# Patient Record
Sex: FEMALE | Race: BLACK OR AFRICAN AMERICAN | NOT HISPANIC OR LATINO | Employment: FULL TIME | URBAN - METROPOLITAN AREA
[De-identification: names, ages, dates, MRNs, and addresses within clinical notes are randomized per-mention and may not be internally consistent; named-entity substitution may affect disease eponyms.]

---

## 2019-08-29 ENCOUNTER — HOSPITAL ENCOUNTER (OUTPATIENT)
Dept: LAB | Facility: HOSPITAL | Age: 34
Discharge: HOME | End: 2019-08-29
Attending: INTERNAL MEDICINE

## 2019-08-29 LAB
BASOPHILS # BLD AUTO: 0.06 10*3/UL (ref 0–0.2)
BASOPHILS NFR BLD AUTO: 0.5 % (ref 0–3)
CONV ABS IMM GRAN: 0.04 10*3/UL (ref 0–0.2)
CONV IMMATURE GRAN: 0.3 % (ref 0–1.8)
DEPRECATED RDW RBC AUTO: 46.1 FL (ref 36.4–46.3)
EOSINOPHIL # BLD AUTO: 0.15 10*3/UL (ref 0–0.7)
EOSINOPHIL # BLD AUTO: 1.2 % (ref 0–7)
ERYTHROCYTE [DISTWIDTH] IN BLOOD BY AUTOMATED COUNT: 15 % (ref 11.7–14.4)
HCT VFR BLD AUTO: 40.8 % (ref 37–47)
HGB BLD-MCNC: 12.9 G/DL (ref 12–16)
LYMPHOCYTES # BLD AUTO: 4.07 10*3/UL (ref 1–5)
LYMPHOCYTES NFR BLD AUTO: 32.4 % (ref 20–45)
MCH RBC QN AUTO: 27 PG (ref 27–31)
MCHC RBC AUTO-ENTMCNC: 31.6 G/DL (ref 33–37)
MCV RBC AUTO: 85.4 FL (ref 81–99)
MONOCYTES # BLD AUTO: 0.6 10*3/UL (ref 0.2–1.2)
MONOCYTES NFR BLD AUTO: 4.8 % (ref 3–10)
NEUTROPHILS # BLD AUTO: 7.65 10*3/UL (ref 2–8)
NEUTROPHILS NFR BLD AUTO: 60.8 % (ref 30–85)
NRBC CBCN: 0 % (ref 0–0.7)
PLATELET # BLD AUTO: 412 10*3/UL (ref 130–400)
PMV BLD AUTO: 10.3 FL (ref 9.4–12.3)
RBC # BLD AUTO: 4.78 10*6/UL (ref 4.2–5.4)
WBC # BLD AUTO: 12.57 10*3/UL (ref 4.8–10.8)

## 2020-02-21 ENCOUNTER — HOSPITAL ENCOUNTER (OUTPATIENT)
Dept: LAB | Facility: HOSPITAL | Age: 35
Discharge: HOME | End: 2020-02-21
Attending: INTERNAL MEDICINE

## 2020-02-21 LAB
BASOPHILS # BLD AUTO: 0.07 10*3/UL (ref 0–0.2)
BASOPHILS NFR BLD AUTO: 0.6 % (ref 0–3)
CONV ABS IMM GRAN: 0.05 10*3/UL (ref 0–0.2)
CONV IMMATURE GRAN: 0.4 % (ref 0–1.8)
CRP SERPL-MCNC: 8.1 MG/L (ref 0–5)
DEPRECATED RDW RBC AUTO: 48.2 FL (ref 36.4–46.3)
EOSINOPHIL # BLD AUTO: 0.2 10*3/UL (ref 0–0.7)
EOSINOPHIL # BLD AUTO: 1.6 % (ref 0–7)
ERYTHROCYTE [DISTWIDTH] IN BLOOD BY AUTOMATED COUNT: 15.2 % (ref 11.7–14.4)
ERYTHROCYTE [SEDIMENTATION RATE] IN BLOOD: 28 MM/H (ref 0–20)
HCT VFR BLD AUTO: 36.4 % (ref 37–47)
HGB BLD-MCNC: 11.9 G/DL (ref 12–16)
LYMPHOCYTES # BLD AUTO: 4.87 10*3/UL (ref 1–5)
LYMPHOCYTES NFR BLD AUTO: 40.1 % (ref 20–45)
MCH RBC QN AUTO: 28.5 PG (ref 27–31)
MCHC RBC AUTO-ENTMCNC: 32.7 G/DL (ref 33–37)
MCV RBC AUTO: 87.3 FL (ref 81–99)
MONOCYTES # BLD AUTO: 0.96 10*3/UL (ref 0.2–1.2)
MONOCYTES NFR BLD AUTO: 7.9 % (ref 3–10)
NEUTROPHILS # BLD AUTO: 5.98 10*3/UL (ref 2–8)
NEUTROPHILS NFR BLD AUTO: 49.4 % (ref 30–85)
NRBC CBCN: 0 % (ref 0–0.7)
PLATELET # BLD AUTO: 374 10*3/UL (ref 130–400)
PMV BLD AUTO: 10.5 FL (ref 9.4–12.3)
RBC # BLD AUTO: 4.17 10*6/UL (ref 4.2–5.4)
WBC # BLD AUTO: 12.13 10*3/UL (ref 4.8–10.8)

## 2020-10-28 ENCOUNTER — HOSPITAL ENCOUNTER (OUTPATIENT)
Dept: LAB | Facility: HOSPITAL | Age: 35
Discharge: HOME | End: 2020-10-28
Attending: INTERNAL MEDICINE

## 2020-10-28 LAB
BASOPHILS # BLD AUTO: 0.07 10*3/UL (ref 0–0.2)
BASOPHILS NFR BLD AUTO: 0.5 % (ref 0–3)
CONV ABS IMM GRAN: 0.05 10*3/UL (ref 0–0.2)
CONV IMMATURE GRAN: 0.4 % (ref 0–1.8)
DEPRECATED RDW RBC AUTO: 46.8 FL (ref 36.4–46.3)
EOSINOPHIL # BLD AUTO: 0.12 10*3/UL (ref 0–0.7)
EOSINOPHIL # BLD AUTO: 0.9 % (ref 0–7)
ERYTHROCYTE [DISTWIDTH] IN BLOOD BY AUTOMATED COUNT: 14.5 % (ref 11.7–14.4)
HCT VFR BLD AUTO: 40.7 % (ref 37–47)
HGB BLD-MCNC: 13.2 G/DL (ref 12–16)
LYMPHOCYTES # BLD AUTO: 3.69 10*3/UL (ref 1–5)
LYMPHOCYTES NFR BLD AUTO: 26.6 % (ref 20–45)
MCH RBC QN AUTO: 28.7 PG (ref 27–31)
MCHC RBC AUTO-ENTMCNC: 32.4 G/DL (ref 33–37)
MCV RBC AUTO: 88.5 FL (ref 81–99)
MONOCYTES # BLD AUTO: 0.71 10*3/UL (ref 0.2–1.2)
MONOCYTES NFR BLD AUTO: 5.1 % (ref 3–10)
NEUTROPHILS # BLD AUTO: 9.22 10*3/UL (ref 2–8)
NEUTROPHILS NFR BLD AUTO: 66.5 % (ref 30–85)
NRBC CBCN: 0 % (ref 0–0.7)
PLATELET # BLD AUTO: 352 10*3/UL (ref 130–400)
PMV BLD AUTO: 10.1 FL (ref 9.4–12.3)
RBC # BLD AUTO: 4.6 10*6/UL (ref 4.2–5.4)
WBC # BLD AUTO: 13.86 10*3/UL (ref 4.8–10.8)

## 2020-10-29 LAB
25(OH)D3 SERPL-MCNC: 49.6 NG/ML (ref 30–100)
CK SERPL-CCNC: 144 U/L (ref 35–230)
CRP SERPL HS-MCNC: 0.58 MG/DL (ref 0–0.5)

## 2020-10-31 LAB
CONV QUANTIFERON TB GOLD: NEGATIVE
QUANTIFERON CRITERIA: NORMAL
QUANTIFERON MITOGEN VALUE: >10 IU/ML
QUANTIFERON NIL VALUE: 0.04 IU/ML
QUANTIFERON TB1 AG VALUE: 0.04 IU/ML
QUANTIFERON TB2 AG VALUE: 0.03 IU/ML

## 2021-06-03 ENCOUNTER — HOSPITAL ENCOUNTER (OUTPATIENT)
Dept: PREADMISSION TESTING | Facility: HOSPITAL | Age: 36
Discharge: HOME | End: 2021-06-03
Attending: OBSTETRICS & GYNECOLOGY

## 2021-06-03 LAB — SARS-COV-2 RNA SPEC QL NAA+PROBE: NOT DETECTED

## 2021-06-03 RX ORDER — BRIMONIDINE TARTRATE 2 MG/ML
1 SOLUTION/ DROPS OPHTHALMIC 2 TIMES DAILY
COMMUNITY
End: 2021-11-17

## 2021-06-03 RX ORDER — LAMOTRIGINE 100 MG/1
150 TABLET ORAL 2 TIMES DAILY
COMMUNITY

## 2021-06-03 RX ORDER — ADALIMUMAB 40MG/0.4ML
40 KIT SUBCUTANEOUS
COMMUNITY

## 2021-06-03 RX ORDER — TRAZODONE HYDROCHLORIDE 100 MG/1
150 TABLET ORAL NIGHTLY
COMMUNITY
End: 2022-04-15 | Stop reason: SDUPTHER

## 2021-06-03 RX ORDER — CETIRIZINE HYDROCHLORIDE 10 MG/1
10 TABLET ORAL DAILY
COMMUNITY

## 2021-06-03 RX ORDER — BUPROPION HYDROCHLORIDE 100 MG/1
150 TABLET ORAL DAILY
COMMUNITY
End: 2023-03-20 | Stop reason: SDUPTHER

## 2021-06-03 RX ORDER — MULTIPLE VITAMINS W/ MINERALS TAB 9MG-400MCG
1 TAB ORAL DAILY
COMMUNITY

## 2021-06-03 RX ORDER — BUSPIRONE HYDROCHLORIDE 15 MG/1
15 TABLET ORAL 3 TIMES DAILY
COMMUNITY

## 2021-06-03 NOTE — PRE-PROCEDURE INSTRUCTIONS
Pre-op instructions reviewed with pt. Instructed to take buproprion, buspirone lamictal, citrizine and eye drops a.m. DOS, pt. Verbalized understanding.

## 2021-06-07 ENCOUNTER — DOCUMENTATION (OUTPATIENT)
Dept: OBSTETRICS AND GYNECOLOGY | Facility: HOSPITAL | Age: 36
End: 2021-06-07

## 2021-06-07 DIAGNOSIS — G89.29 CHRONIC PELVIC PAIN IN FEMALE: Primary | ICD-10-CM

## 2021-06-07 DIAGNOSIS — R10.2 CHRONIC PELVIC PAIN IN FEMALE: Primary | ICD-10-CM

## 2021-06-07 DIAGNOSIS — D25.1 FIBROIDS, INTRAMURAL: ICD-10-CM

## 2021-06-07 RX ORDER — CEFAZOLIN SODIUM 2 G/100ML
2 INJECTION, SOLUTION INTRAVENOUS ONCE
Status: CANCELLED | OUTPATIENT
Start: 2021-06-07 | End: 2021-06-07

## 2021-06-07 RX ORDER — PHENAZOPYRIDINE HYDROCHLORIDE 200 MG/1
200 TABLET, FILM COATED ORAL ONCE
Status: CANCELLED | OUTPATIENT
Start: 2021-06-07 | End: 2021-06-07

## 2021-06-07 NOTE — H&P
[facility]   HISTORY AND PHYSICAL    Patient Name: Bhargavi Jeronimo  : 1985  MRN: [id number]  Primary Care Physician:  Provider, No Known  Date of admission: (Not on file)    Subjective   Subjective     Chief Complaint: Chronic Pelvic Pain, Fibroids    History of Present Illness :above    Review of Systems : above. G 1 P0    Personal History     Past Medical History:   Diagnosis Date   • Anxiety    • Depression    • Intermediate uveitis    • Migraine        Past Surgical History:   Procedure Laterality Date   • ORIF ANKLE FRACTURE Left        Family History: family history is not on file. Otherwise pertinent FHx was reviewed and not pertinent to current issue.    Social History:  reports that she has been smoking. She has been smoking about 0.50 packs per day. She has never used smokeless tobacco. She reports previous alcohol use. She reports that she does not use drugs.    Home Medications:  Adalimumab, brimonidine, buPROPion, busPIRone, cetirizine, lamoTRIgine, multivitamin with minerals, and traZODone    Allergies:  Allergies   Allergen Reactions   • Latex Hives     Tree nuts and peanuts   • Nuts Anaphylaxis       Objective    Objective     Vitals:        Physical Exam     Lungs clear  Heart regular  Abdomen is soft and non juan  Uterus is moble and non tender without adnexal masses    Result Review    Result Review:  I have personally reviewed the results from the time of this admission to 2021 19:02 EDT and agree with these findings:  []  Laboratory  []  Microbiology  []  Radiology  []  EKG/Telemetry   []  Cardiology/Vascular   []  Pathology  []  Old records  []  Other:  Most notable findings include: pain history    Assessment/Plan   Assessment / Plan     Brief Patient Summary:  Bhargavi Jeronimo is a 35 y.o. G 1 P 0  female who has pain and fibroids    Active Hospital Problems:  There are no active hospital problems to display for this patient.    Plan:   TLH, possible BSO      DVT  prophylaxis:  No DVT prophylaxis order currently exists.    CODE STATUS:       Admission Status:  I believe this patient meets observation status.    Electronically signed by Cliff Pillai MD, 06/07/21, 7:02 PM EDT.

## 2021-06-07 NOTE — H&P (VIEW-ONLY)
[facility]   HISTORY AND PHYSICAL    Patient Name: Bhargavi Jeronimo  : 1985  MRN: [id number]  Primary Care Physician:  Provider, No Known  Date of admission: (Not on file)    Subjective   Subjective     Chief Complaint: Chronic Pelvic Pain, Fibroids    History of Present Illness :above    Review of Systems : above. G 1 P0    Personal History     Past Medical History:   Diagnosis Date   • Anxiety    • Depression    • Intermediate uveitis    • Migraine        Past Surgical History:   Procedure Laterality Date   • ORIF ANKLE FRACTURE Left        Family History: family history is not on file. Otherwise pertinent FHx was reviewed and not pertinent to current issue.    Social History:  reports that she has been smoking. She has been smoking about 0.50 packs per day. She has never used smokeless tobacco. She reports previous alcohol use. She reports that she does not use drugs.    Home Medications:  Adalimumab, brimonidine, buPROPion, busPIRone, cetirizine, lamoTRIgine, multivitamin with minerals, and traZODone    Allergies:  Allergies   Allergen Reactions   • Latex Hives     Tree nuts and peanuts   • Nuts Anaphylaxis       Objective    Objective     Vitals:        Physical Exam     Lungs clear  Heart regular  Abdomen is soft and non juan  Uterus is moble and non tender without adnexal masses    Result Review    Result Review:  I have personally reviewed the results from the time of this admission to 2021 19:02 EDT and agree with these findings:  []  Laboratory  []  Microbiology  []  Radiology  []  EKG/Telemetry   []  Cardiology/Vascular   []  Pathology  []  Old records  []  Other:  Most notable findings include: pain history    Assessment/Plan   Assessment / Plan     Brief Patient Summary:  Bhargavi Jeronimo is a 35 y.o. G 1 P 0  female who has pain and fibroids    Active Hospital Problems:  There are no active hospital problems to display for this patient.    Plan:   TLH, possible BSO      DVT  prophylaxis:  No DVT prophylaxis order currently exists.    CODE STATUS:       Admission Status:  I believe this patient meets observation status.    Electronically signed by Cliff Pillai MD, 06/07/21, 7:02 PM EDT.

## 2021-06-08 ENCOUNTER — HOSPITAL ENCOUNTER (INPATIENT)
Facility: HOSPITAL | Age: 36
LOS: 2 days | Discharge: HOME | End: 2021-06-10
Attending: OBSTETRICS & GYNECOLOGY | Admitting: OBSTETRICS & GYNECOLOGY

## 2021-06-08 ENCOUNTER — ANESTHESIA EVENT (OUTPATIENT)
Dept: PERIOP | Facility: HOSPITAL | Age: 36
End: 2021-06-08

## 2021-06-08 ENCOUNTER — ANESTHESIA (OUTPATIENT)
Dept: PERIOP | Facility: HOSPITAL | Age: 36
End: 2021-06-08

## 2021-06-08 DIAGNOSIS — R10.2 CHRONIC PELVIC PAIN IN FEMALE: Primary | ICD-10-CM

## 2021-06-08 DIAGNOSIS — D21.9 FIBROIDS: ICD-10-CM

## 2021-06-08 DIAGNOSIS — G89.29 CHRONIC PELVIC PAIN IN FEMALE: Primary | ICD-10-CM

## 2021-06-08 DIAGNOSIS — D25.1 FIBROIDS, INTRAMURAL: ICD-10-CM

## 2021-06-08 LAB
ABO GROUP BLD: NORMAL
ABO GROUP BLD: NORMAL
B-HCG UR QL: NEGATIVE
BASOPHILS # BLD AUTO: 0.05 10*3/MM3 (ref 0–0.2)
BASOPHILS NFR BLD AUTO: 0.5 % (ref 0–1.5)
BLD GP AB SCN SERPL QL: NEGATIVE
DEPRECATED RDW RBC AUTO: 44.8 FL (ref 37–54)
EOSINOPHIL # BLD AUTO: 0.11 10*3/MM3 (ref 0–0.4)
EOSINOPHIL NFR BLD AUTO: 1.1 % (ref 0.3–6.2)
ERYTHROCYTE [DISTWIDTH] IN BLOOD BY AUTOMATED COUNT: 14.1 % (ref 12.3–15.4)
HCT VFR BLD AUTO: 31.7 % (ref 34–46.6)
HCT VFR BLD AUTO: 37.4 % (ref 34–46.6)
HCT VFR BLD AUTO: 38.7 % (ref 34–46.6)
HGB BLD-MCNC: 10.4 G/DL (ref 12–15.9)
HGB BLD-MCNC: 12 G/DL (ref 12–15.9)
HGB BLD-MCNC: 12.9 G/DL (ref 12–15.9)
IMM GRANULOCYTES # BLD AUTO: 0.02 10*3/MM3 (ref 0–0.05)
IMM GRANULOCYTES NFR BLD AUTO: 0.2 % (ref 0–0.5)
LYMPHOCYTES # BLD AUTO: 3.75 10*3/MM3 (ref 0.7–3.1)
LYMPHOCYTES NFR BLD AUTO: 37.8 % (ref 19.6–45.3)
MCH RBC QN AUTO: 28.9 PG (ref 26.6–33)
MCHC RBC AUTO-ENTMCNC: 33.3 G/DL (ref 31.5–35.7)
MCV RBC AUTO: 86.8 FL (ref 79–97)
MONOCYTES # BLD AUTO: 0.75 10*3/MM3 (ref 0.1–0.9)
MONOCYTES NFR BLD AUTO: 7.6 % (ref 5–12)
NEUTROPHILS NFR BLD AUTO: 5.24 10*3/MM3 (ref 1.7–7)
NEUTROPHILS NFR BLD AUTO: 52.8 % (ref 42.7–76)
NRBC BLD AUTO-RTO: 0 /100 WBC (ref 0–0.2)
PLATELET # BLD AUTO: 272 10*3/MM3 (ref 140–450)
PMV BLD AUTO: 9.2 FL (ref 6–12)
RBC # BLD AUTO: 4.46 10*6/MM3 (ref 3.77–5.28)
RH BLD: POSITIVE
RH BLD: POSITIVE
T&S EXPIRATION DATE: NORMAL
WBC # BLD AUTO: 9.92 10*3/MM3 (ref 3.4–10.8)

## 2021-06-08 PROCEDURE — 81025 URINE PREGNANCY TEST: CPT | Performed by: OBSTETRICS & GYNECOLOGY

## 2021-06-08 PROCEDURE — 0UJD4ZZ INSPECTION OF UTERUS AND CERVIX, PERCUTANEOUS ENDOSCOPIC APPROACH: ICD-10-PCS | Performed by: OBSTETRICS & GYNECOLOGY

## 2021-06-08 PROCEDURE — 86900 BLOOD TYPING SEROLOGIC ABO: CPT | Performed by: OBSTETRICS & GYNECOLOGY

## 2021-06-08 PROCEDURE — 0UT70ZZ RESECTION OF BILATERAL FALLOPIAN TUBES, OPEN APPROACH: ICD-10-PCS | Performed by: OBSTETRICS & GYNECOLOGY

## 2021-06-08 PROCEDURE — 25010000002 HYDROMORPHONE PER 4 MG: Performed by: NURSE ANESTHETIST, CERTIFIED REGISTERED

## 2021-06-08 PROCEDURE — 85025 COMPLETE CBC W/AUTO DIFF WBC: CPT | Performed by: OBSTETRICS & GYNECOLOGY

## 2021-06-08 PROCEDURE — 25010000002 ONDANSETRON PER 1 MG: Performed by: NURSE ANESTHETIST, CERTIFIED REGISTERED

## 2021-06-08 PROCEDURE — 25010000003 CEFAZOLIN IN DEXTROSE 2-4 GM/100ML-% SOLUTION: Performed by: OBSTETRICS & GYNECOLOGY

## 2021-06-08 PROCEDURE — 25010000002 MORPHINE PER 10 MG: Performed by: OBSTETRICS & GYNECOLOGY

## 2021-06-08 PROCEDURE — 0TJB8ZZ INSPECTION OF BLADDER, VIA NATURAL OR ARTIFICIAL OPENING ENDOSCOPIC: ICD-10-PCS | Performed by: OBSTETRICS & GYNECOLOGY

## 2021-06-08 PROCEDURE — 86901 BLOOD TYPING SEROLOGIC RH(D): CPT

## 2021-06-08 PROCEDURE — 25010000002 PROPOFOL 10 MG/ML EMULSION: Performed by: NURSE ANESTHETIST, CERTIFIED REGISTERED

## 2021-06-08 PROCEDURE — 25010000002 DEXAMETHASONE PER 1 MG: Performed by: NURSE ANESTHETIST, CERTIFIED REGISTERED

## 2021-06-08 PROCEDURE — 25010000002 MIDAZOLAM PER 1MG: Performed by: ANESTHESIOLOGY

## 2021-06-08 PROCEDURE — 25010000002 FENTANYL CITRATE (PF) 50 MCG/ML SOLUTION: Performed by: NURSE ANESTHETIST, CERTIFIED REGISTERED

## 2021-06-08 PROCEDURE — 88307 TISSUE EXAM BY PATHOLOGIST: CPT | Performed by: OBSTETRICS & GYNECOLOGY

## 2021-06-08 PROCEDURE — 0UT90ZZ RESECTION OF UTERUS, OPEN APPROACH: ICD-10-PCS | Performed by: OBSTETRICS & GYNECOLOGY

## 2021-06-08 PROCEDURE — 85014 HEMATOCRIT: CPT | Performed by: OBSTETRICS & GYNECOLOGY

## 2021-06-08 PROCEDURE — 25010000003 MEPERIDINE PER 100 MG: Performed by: NURSE ANESTHETIST, CERTIFIED REGISTERED

## 2021-06-08 PROCEDURE — 85018 HEMOGLOBIN: CPT | Performed by: OBSTETRICS & GYNECOLOGY

## 2021-06-08 PROCEDURE — 86900 BLOOD TYPING SEROLOGIC ABO: CPT

## 2021-06-08 PROCEDURE — 86850 RBC ANTIBODY SCREEN: CPT | Performed by: OBSTETRICS & GYNECOLOGY

## 2021-06-08 PROCEDURE — 86901 BLOOD TYPING SEROLOGIC RH(D): CPT | Performed by: OBSTETRICS & GYNECOLOGY

## 2021-06-08 PROCEDURE — 25010000002 KETOROLAC TROMETHAMINE PER 15 MG: Performed by: OBSTETRICS & GYNECOLOGY

## 2021-06-08 PROCEDURE — 94799 UNLISTED PULMONARY SVC/PX: CPT

## 2021-06-08 PROCEDURE — 25010000002 HYDROMORPHONE 1 MG/ML SOLUTION: Performed by: NURSE ANESTHETIST, CERTIFIED REGISTERED

## 2021-06-08 RX ORDER — DEXAMETHASONE SODIUM PHOSPHATE 4 MG/ML
INJECTION, SOLUTION INTRA-ARTICULAR; INTRALESIONAL; INTRAMUSCULAR; INTRAVENOUS; SOFT TISSUE AS NEEDED
Status: DISCONTINUED | OUTPATIENT
Start: 2021-06-08 | End: 2021-06-08 | Stop reason: SURG

## 2021-06-08 RX ORDER — CEFAZOLIN SODIUM 2 G/100ML
2 INJECTION, SOLUTION INTRAVENOUS ONCE
Status: COMPLETED | OUTPATIENT
Start: 2021-06-08 | End: 2021-06-08

## 2021-06-08 RX ORDER — MAGNESIUM HYDROXIDE 1200 MG/15ML
LIQUID ORAL AS NEEDED
Status: DISCONTINUED | OUTPATIENT
Start: 2021-06-08 | End: 2021-06-08 | Stop reason: HOSPADM

## 2021-06-08 RX ORDER — SIMETHICONE 80 MG
80 TABLET,CHEWABLE ORAL 4 TIMES DAILY PRN
Status: DISCONTINUED | OUTPATIENT
Start: 2021-06-08 | End: 2021-06-10

## 2021-06-08 RX ORDER — MIDAZOLAM HYDROCHLORIDE 1 MG/ML
2 INJECTION INTRAMUSCULAR; INTRAVENOUS ONCE
Status: COMPLETED | OUTPATIENT
Start: 2021-06-08 | End: 2021-06-08

## 2021-06-08 RX ORDER — HYDROMORPHONE HCL 110MG/55ML
PATIENT CONTROLLED ANALGESIA SYRINGE INTRAVENOUS AS NEEDED
Status: DISCONTINUED | OUTPATIENT
Start: 2021-06-08 | End: 2021-06-08 | Stop reason: SURG

## 2021-06-08 RX ORDER — KETOROLAC TROMETHAMINE 15 MG/ML
15 INJECTION, SOLUTION INTRAMUSCULAR; INTRAVENOUS EVERY 6 HOURS
Status: DISCONTINUED | OUTPATIENT
Start: 2021-06-08 | End: 2021-06-09

## 2021-06-08 RX ORDER — ONDANSETRON 2 MG/ML
INJECTION INTRAMUSCULAR; INTRAVENOUS AS NEEDED
Status: DISCONTINUED | OUTPATIENT
Start: 2021-06-08 | End: 2021-06-08 | Stop reason: SURG

## 2021-06-08 RX ORDER — MORPHINE SULFATE 1 MG/ML
INJECTION INTRAVENOUS CONTINUOUS PRN
Status: DISCONTINUED | OUTPATIENT
Start: 2021-06-08 | End: 2021-06-09

## 2021-06-08 RX ORDER — BRIMONIDINE TARTRATE 0.15 %
1 DROPS OPHTHALMIC (EYE) 2 TIMES DAILY
Status: DISCONTINUED | OUTPATIENT
Start: 2021-06-08 | End: 2021-06-10

## 2021-06-08 RX ORDER — SODIUM CHLORIDE 0.9 % (FLUSH) 0.9 %
10 SYRINGE (ML) INJECTION AS NEEDED
Status: DISCONTINUED | OUTPATIENT
Start: 2021-06-08 | End: 2021-06-08 | Stop reason: HOSPADM

## 2021-06-08 RX ORDER — FENTANYL CITRATE 50 UG/ML
INJECTION, SOLUTION INTRAMUSCULAR; INTRAVENOUS AS NEEDED
Status: DISCONTINUED | OUTPATIENT
Start: 2021-06-08 | End: 2021-06-08 | Stop reason: SURG

## 2021-06-08 RX ORDER — BISACODYL 5 MG/1
10 TABLET, DELAYED RELEASE ORAL DAILY PRN
Status: DISCONTINUED | OUTPATIENT
Start: 2021-06-08 | End: 2021-06-10

## 2021-06-08 RX ORDER — SODIUM CHLORIDE, SODIUM LACTATE, POTASSIUM CHLORIDE, CALCIUM CHLORIDE 600; 310; 30; 20 MG/100ML; MG/100ML; MG/100ML; MG/100ML
9 INJECTION, SOLUTION INTRAVENOUS CONTINUOUS PRN
Status: DISCONTINUED | OUTPATIENT
Start: 2021-06-08 | End: 2021-06-08 | Stop reason: HOSPADM

## 2021-06-08 RX ORDER — LIDOCAINE HYDROCHLORIDE 20 MG/ML
INJECTION, SOLUTION EPIDURAL; INFILTRATION; INTRACAUDAL; PERINEURAL AS NEEDED
Status: DISCONTINUED | OUTPATIENT
Start: 2021-06-08 | End: 2021-06-08 | Stop reason: SURG

## 2021-06-08 RX ORDER — PROPOFOL 10 MG/ML
VIAL (ML) INTRAVENOUS AS NEEDED
Status: DISCONTINUED | OUTPATIENT
Start: 2021-06-08 | End: 2021-06-08 | Stop reason: SURG

## 2021-06-08 RX ORDER — LAMOTRIGINE 100 MG/1
100 TABLET ORAL 2 TIMES DAILY
Status: DISCONTINUED | OUTPATIENT
Start: 2021-06-08 | End: 2021-06-10

## 2021-06-08 RX ORDER — NALOXONE HCL 0.4 MG/ML
0.1 VIAL (ML) INJECTION
Status: DISCONTINUED | OUTPATIENT
Start: 2021-06-08 | End: 2021-06-09

## 2021-06-08 RX ORDER — PHENAZOPYRIDINE HYDROCHLORIDE 200 MG/1
200 TABLET, FILM COATED ORAL ONCE
Status: COMPLETED | OUTPATIENT
Start: 2021-06-08 | End: 2021-06-08

## 2021-06-08 RX ORDER — GLYCOPYRROLATE 0.2 MG/ML
0.2 INJECTION INTRAMUSCULAR; INTRAVENOUS
Status: COMPLETED | OUTPATIENT
Start: 2021-06-08 | End: 2021-06-08

## 2021-06-08 RX ORDER — ONDANSETRON 2 MG/ML
4 INJECTION INTRAMUSCULAR; INTRAVENOUS ONCE AS NEEDED
Status: DISCONTINUED | OUTPATIENT
Start: 2021-06-08 | End: 2021-06-08 | Stop reason: HOSPADM

## 2021-06-08 RX ORDER — SODIUM CHLORIDE 9 MG/ML
INJECTION, SOLUTION INTRAVENOUS CONTINUOUS PRN
Status: DISCONTINUED | OUTPATIENT
Start: 2021-06-08 | End: 2021-06-08 | Stop reason: SURG

## 2021-06-08 RX ORDER — MEPERIDINE HYDROCHLORIDE 25 MG/ML
12.5 INJECTION INTRAMUSCULAR; INTRAVENOUS; SUBCUTANEOUS
Status: DISCONTINUED | OUTPATIENT
Start: 2021-06-08 | End: 2021-06-08 | Stop reason: HOSPADM

## 2021-06-08 RX ORDER — SODIUM CHLORIDE 0.9 % (FLUSH) 0.9 %
10 SYRINGE (ML) INJECTION EVERY 12 HOURS SCHEDULED
Status: DISCONTINUED | OUTPATIENT
Start: 2021-06-08 | End: 2021-06-08 | Stop reason: HOSPADM

## 2021-06-08 RX ORDER — SODIUM CHLORIDE, SODIUM LACTATE, POTASSIUM CHLORIDE, CALCIUM CHLORIDE 600; 310; 30; 20 MG/100ML; MG/100ML; MG/100ML; MG/100ML
120 INJECTION, SOLUTION INTRAVENOUS CONTINUOUS
Status: DISCONTINUED | OUTPATIENT
Start: 2021-06-08 | End: 2021-06-10

## 2021-06-08 RX ORDER — OXYCODONE HYDROCHLORIDE AND ACETAMINOPHEN 5; 325 MG/1; MG/1
1 TABLET ORAL EVERY 4 HOURS PRN
Status: DISCONTINUED | OUTPATIENT
Start: 2021-06-08 | End: 2021-06-10

## 2021-06-08 RX ORDER — ESMOLOL HYDROCHLORIDE 10 MG/ML
INJECTION INTRAVENOUS AS NEEDED
Status: DISCONTINUED | OUTPATIENT
Start: 2021-06-08 | End: 2021-06-08 | Stop reason: SURG

## 2021-06-08 RX ORDER — OXYCODONE HYDROCHLORIDE 5 MG/1
5 TABLET ORAL ONCE AS NEEDED
Status: DISCONTINUED | OUTPATIENT
Start: 2021-06-08 | End: 2021-06-08 | Stop reason: HOSPADM

## 2021-06-08 RX ORDER — ROCURONIUM BROMIDE 10 MG/ML
INJECTION, SOLUTION INTRAVENOUS AS NEEDED
Status: DISCONTINUED | OUTPATIENT
Start: 2021-06-08 | End: 2021-06-08 | Stop reason: SURG

## 2021-06-08 RX ORDER — BUSPIRONE HYDROCHLORIDE 15 MG/1
15 TABLET ORAL 3 TIMES DAILY
Status: DISCONTINUED | OUTPATIENT
Start: 2021-06-08 | End: 2021-06-10

## 2021-06-08 RX ORDER — ACETAMINOPHEN 500 MG
1000 TABLET ORAL ONCE
Status: COMPLETED | OUTPATIENT
Start: 2021-06-08 | End: 2021-06-08

## 2021-06-08 RX ADMIN — LIDOCAINE HYDROCHLORIDE 80 MG: 20 INJECTION, SOLUTION EPIDURAL; INFILTRATION; INTRACAUDAL; PERINEURAL at 07:45

## 2021-06-08 RX ADMIN — CEFAZOLIN SODIUM 2 G: 2 INJECTION, SOLUTION INTRAVENOUS at 07:43

## 2021-06-08 RX ADMIN — OXYCODONE HYDROCHLORIDE AND ACETAMINOPHEN 1 TABLET: 5; 325 TABLET ORAL at 21:01

## 2021-06-08 RX ADMIN — SODIUM CHLORIDE: 9 INJECTION, SOLUTION INTRAVENOUS at 09:19

## 2021-06-08 RX ADMIN — HYDROMORPHONE HYDROCHLORIDE 0.5 MG: 1 INJECTION, SOLUTION INTRAMUSCULAR; INTRAVENOUS; SUBCUTANEOUS at 12:00

## 2021-06-08 RX ADMIN — SUGAMMADEX 200 MG: 100 INJECTION, SOLUTION INTRAVENOUS at 10:37

## 2021-06-08 RX ADMIN — MEPERIDINE HYDROCHLORIDE 12.5 MG: 25 INJECTION INTRAMUSCULAR; INTRAVENOUS; SUBCUTANEOUS at 11:27

## 2021-06-08 RX ADMIN — HYDROMORPHONE HYDROCHLORIDE 1 MG: 2 INJECTION, SOLUTION INTRAMUSCULAR; INTRAVENOUS; SUBCUTANEOUS at 07:45

## 2021-06-08 RX ADMIN — SODIUM CHLORIDE, POTASSIUM CHLORIDE, SODIUM LACTATE AND CALCIUM CHLORIDE: 600; 310; 30; 20 INJECTION, SOLUTION INTRAVENOUS at 09:27

## 2021-06-08 RX ADMIN — LAMOTRIGINE 100 MG: 100 TABLET ORAL at 20:19

## 2021-06-08 RX ADMIN — HYDROMORPHONE HYDROCHLORIDE 0.5 MG: 1 INJECTION, SOLUTION INTRAMUSCULAR; INTRAVENOUS; SUBCUTANEOUS at 11:29

## 2021-06-08 RX ADMIN — BUSPIRONE HYDROCHLORIDE 15 MG: 15 TABLET ORAL at 15:54

## 2021-06-08 RX ADMIN — ONDANSETRON 4 MG: 2 INJECTION INTRAMUSCULAR; INTRAVENOUS at 09:40

## 2021-06-08 RX ADMIN — PROPOFOL 180 MG: 10 INJECTION, EMULSION INTRAVENOUS at 07:45

## 2021-06-08 RX ADMIN — ACETAMINOPHEN 1000 MG: 500 TABLET ORAL at 07:34

## 2021-06-08 RX ADMIN — SODIUM CHLORIDE, POTASSIUM CHLORIDE, SODIUM LACTATE AND CALCIUM CHLORIDE 9 ML/HR: 600; 310; 30; 20 INJECTION, SOLUTION INTRAVENOUS at 07:34

## 2021-06-08 RX ADMIN — GLYCOPYRROLATE 0.2 MG: 0.2 INJECTION INTRAMUSCULAR; INTRAVENOUS at 07:35

## 2021-06-08 RX ADMIN — ROCURONIUM BROMIDE 50 MG: 10 INJECTION INTRAVENOUS at 07:45

## 2021-06-08 RX ADMIN — MORPHINE SULFATE: 1 INJECTION INTRAVENOUS at 14:57

## 2021-06-08 RX ADMIN — PHENAZOPYRIDINE 200 MG: 200 TABLET ORAL at 07:35

## 2021-06-08 RX ADMIN — KETOROLAC TROMETHAMINE 15 MG: 15 INJECTION, SOLUTION INTRAMUSCULAR; INTRAVENOUS at 20:19

## 2021-06-08 RX ADMIN — ROCURONIUM BROMIDE 20 MG: 10 INJECTION INTRAVENOUS at 08:25

## 2021-06-08 RX ADMIN — BUSPIRONE HYDROCHLORIDE 15 MG: 15 TABLET ORAL at 20:19

## 2021-06-08 RX ADMIN — ESMOLOL HYDROCHLORIDE 5 MG: 100 INJECTION, SOLUTION INTRAVENOUS at 08:26

## 2021-06-08 RX ADMIN — FENTANYL CITRATE 50 MCG: 50 INJECTION INTRAMUSCULAR; INTRAVENOUS at 10:34

## 2021-06-08 RX ADMIN — ESMOLOL HYDROCHLORIDE 5 MG: 100 INJECTION, SOLUTION INTRAVENOUS at 08:28

## 2021-06-08 RX ADMIN — HYDROMORPHONE HYDROCHLORIDE 1 MG: 2 INJECTION, SOLUTION INTRAMUSCULAR; INTRAVENOUS; SUBCUTANEOUS at 08:29

## 2021-06-08 RX ADMIN — MIDAZOLAM HYDROCHLORIDE 2 MG: 1 INJECTION, SOLUTION INTRAMUSCULAR; INTRAVENOUS at 07:35

## 2021-06-08 RX ADMIN — SIMETHICONE 80 MG: 80 TABLET, CHEWABLE ORAL at 23:14

## 2021-06-08 RX ADMIN — DEXAMETHASONE SODIUM PHOSPHATE 8 MG: 4 INJECTION INTRA-ARTICULAR; INTRALESIONAL; INTRAMUSCULAR; INTRAVENOUS; SOFT TISSUE at 08:00

## 2021-06-08 RX ADMIN — SODIUM CHLORIDE, POTASSIUM CHLORIDE, SODIUM LACTATE AND CALCIUM CHLORIDE 120 ML/HR: 600; 310; 30; 20 INJECTION, SOLUTION INTRAVENOUS at 15:02

## 2021-06-08 RX ADMIN — SODIUM CHLORIDE, POTASSIUM CHLORIDE, SODIUM LACTATE AND CALCIUM CHLORIDE: 600; 310; 30; 20 INJECTION, SOLUTION INTRAVENOUS at 08:31

## 2021-06-08 RX ADMIN — BRIMONIDINE TARTRATE 1 DROP: 1.5 SOLUTION OPHTHALMIC at 20:20

## 2021-06-08 RX ADMIN — FENTANYL CITRATE 50 MCG: 50 INJECTION INTRAMUSCULAR; INTRAVENOUS at 08:51

## 2021-06-08 NOTE — INTERVAL H&P NOTE
H&P reviewed. The patient was examined and there are no changes to the H&P.  Risks, benefits and complications discussed with they patient.

## 2021-06-08 NOTE — ANESTHESIA POSTPROCEDURE EVALUATION
Patient: Bhargavi Jeronimo    Procedure Summary     Date: 06/08/21 Room / Location: [facility] OR 05 / [facility] MAIN OR    Anesthesia Start: 0743 Anesthesia Stop: 1110    Procedures:       CYSTOSCOPY (N/A Urethra)      DIAGNOSTIC LAPAROSCOPY TOTAL ABDOMINAL HYSTERECTOMY, BILATERAL SALPINGECTOMY (N/A Abdomen) Diagnosis: (FIBROIDS, PAIN)    Surgeons: Cliff Pillai MD Provider: Clifton Lim MD    Anesthesia Type: general ASA Status: 1          Anesthesia Type: general    Vitals  Vitals Value Taken Time   /79 06/08/21 1145   Temp 36.3 °C (97.3 °F) 06/08/21 1109   Pulse 74 06/08/21 1145   Resp 18 06/08/21 1140   SpO2 100 % 06/08/21 1145   Vitals shown include unvalidated device data.        Post Anesthesia Care and Evaluation    Patient location during evaluation: PACU  Patient participation: complete - patient participated  Level of consciousness: awake  Pain score: 0  Pain management: adequate  Airway patency: patent  Anesthetic complications: No anesthetic complications  PONV Status: none  Cardiovascular status: acceptable and stable  Respiratory status: acceptable and room air  Hydration status: acceptable

## 2021-06-08 NOTE — PLAN OF CARE
Goal Outcome Evaluation:              Outcome Summary: s/p total hysterectomy. patient has been alert and oriented x4 since arriving to the unit. respirations even and unlabored. PCA pump initiated and in use at bedside for pain. no complaints of nausea tolerated soups for dinner without complaint. no bleeding at sites noted since arriving on the unit.

## 2021-06-08 NOTE — ANESTHESIA PREPROCEDURE EVALUATION
Anesthesia Evaluation     Patient summary reviewed and Nursing notes reviewed   no history of anesthetic complications:  NPO Solid Status: > 8 hours  NPO Liquid Status: > 2 hours           Airway   Mallampati: I  TM distance: >3 FB  Neck ROM: full  No difficulty expected  Dental - normal exam     Pulmonary - negative pulmonary ROS and normal exam    breath sounds clear to auscultation  Cardiovascular - negative cardio ROS and normal exam  Exercise tolerance: good (4-7 METS)    Rhythm: regular  Rate: normal        Neuro/Psych- negative ROS  GI/Hepatic/Renal/Endo - negative ROS     Musculoskeletal (-) negative ROS    Abdominal    Substance History - negative use     OB/GYN negative ob/gyn ROS         Other - negative ROS                       Anesthesia Plan    ASA 1     general     intravenous induction     Anesthetic plan, all risks, benefits, and alternatives have been provided, discussed and informed consent has been obtained with: patient.  Use of blood products discussed with patient  Consented to blood products.

## 2021-06-08 NOTE — OP NOTE
Subjective     Date of Service:  06/08/21  Time of Service:  11:18 EDT    Surgical Staff: Surgeon(s) and Role:     * Cliff Pillai MD - Primary   Additional Staff: BRADLY Pillai RN   Pre-operative diagnosis(es):  Chronic pelvic pain.  Uterine fibroids       Post-operative diagnosis(es):  Chronic pelvic pain.  Uterine fibroids.  Intra operative hemorrhage   Procedure(s): Procedure(s):  CYSTOSCOPY  DIAGNOSTIC LAPAROSCOPY TOTAL ABDOMINAL HYSTERECTOMY, BILATERAL SALPINGECTOMY     Antibiotics: cefazolin (Ancef) ordered on call to OR           Objective      Operative findings:  After appropriate informed consent the patient was taken to the operating room where she was prepped and draped in the usual manner using yellowfin stirrups.  Exam under anesthesia revealed an enlarged irregular uterus with a clear cul-de-sac.  Attention was then turned above where an 11 mm infraumbilical incision was made with a knife.  The varies needle was inserted on the first attempt.  2.1 L of CO2 gas were instilled in the peritoneal cavity.  A 11 mm bladeless trocar was then placed without incident.  The patient body was quite small so she had to be put into Trendelenburg to place the mid quadrant 8 cm lateral 5 mm bladeless trochars.  A suprapubic 5 mm trocar was then placed to aid in exposure the left fallopian tube was  from the ovary using the LigaSure device then I went down through the left utero-ovarian ligament down through the round and broad ligament stopping at the level of the cervix.  The same procedure was then carried out on the contralateral side.  A bladder flap was then created using the LigaSure hook device as the uterus was dextrorotated it appeared that the left uterine artery tore along the dissected area multiple attempts of obtaining hemostasis was unsuccessful at this point.  The decision was made to convert this into an open laparotomy which was completed rather swiftly.  A modified Pfannenstiel  incision was made with a knife and carried down to the fascia which was opened in a curvilinear manner and the peritoneum was entered sharply.  Pressure was then applied to the uterine artery at the level of the cervix.  An internal retractor was then placed and the area of bleeding was clamped with standard hysterectomy clamps of the body of the uterus was then amputated giving accessibility to the pelvis.  The cervical stump was removed in a standard fashion.  The vaginal cuff was closed with grand angle and standard angle sutures.  The rest of the cuff was then closed with 0 Vicryl interrupted figure-of-eight sutures the pelvis was copiously irrigated both ureters were visualized to be peristalsing freely.  At this point a cystoscopy was performed.  And a good flow of urine was seen coming through each ureter.  The Garcia catheter was replaced and vaginal packing was placed.  Attention was then turned again above the operative field was completely hemostatic packing was removed.  A sponge count was correct at this time the parietal peritoneum was closed with 3-0 Vicryl the rectus muscle bellies loosely approximated 0 Vicryl figure-of-eight sutures the fascia was closed with 0 PDS starting at each angle time there is a center the subcu tissues brought together with 3-0 Vicryl and all skin entry points were closed with sylvia Bhargavi tolerated procedure well and is gone to recovery room in stable condition.  The case was discussed with her mother and I will speak with Bhargavi later in her hospital course.  I expect her to be here approximately 2 days.   Specimens removed: ID Type Source Tests Collected by Time   1 : type and cross Blood Blood, Venous Line TYPE AND SCREEN Cliff Pillai MD 6/8/2021 0916   2 (Not marked as sent) : H&H Blood Blood, Venous Line HEMOGLOBIN AND HEMATOCRIT, BLOOD Maude Slater, RN 6/8/2021 1009   A :  Tissue Uterus, Cervix, Bilateral Fallopian Tubes  TISSUE PATHOLOGY EXAM  Cliff Pillai MD 6/8/2021 0933          Output: Documented Output  Est. Blood Loss 750mL      I/O this shift:  In: 3177.7 [I.V.:3077.7; IV Piggyback:100]  Out: 750 [Blood:750]     Blood products used: No   Drains: Urethral Catheter Non-latex 16 Fr. (Active)   Site Assessment Clean 06/08/21 1054   Collection Container Standard drainage bag 06/08/21 1054   Securement Method Securing device 06/08/21 1054   Catheter care complete Yes 06/08/21 1054      Implant Information: Nothing was implanted during the procedure   Complications:  Intraoperative hemorrhage   Intraoperative consult(s):    Condition: stable   Disposition: to PACU and then admit to  medical - surgical floor         Assessment/Plan     Postoperative care              Cliff Pillai MD  06/08/21  11:18 EDT

## 2021-06-09 LAB
DEPRECATED RDW RBC AUTO: 42.3 FL (ref 37–54)
ERYTHROCYTE [DISTWIDTH] IN BLOOD BY AUTOMATED COUNT: 13.6 % (ref 12.3–15.4)
HCT VFR BLD AUTO: 29.4 % (ref 34–46.6)
HGB BLD-MCNC: 10 G/DL (ref 12–15.9)
MCH RBC QN AUTO: 29 PG (ref 26.6–33)
MCHC RBC AUTO-ENTMCNC: 34 G/DL (ref 31.5–35.7)
MCV RBC AUTO: 85.2 FL (ref 79–97)
PLATELET # BLD AUTO: 249 10*3/MM3 (ref 140–450)
PMV BLD AUTO: 9.5 FL (ref 6–12)
RBC # BLD AUTO: 3.45 10*6/MM3 (ref 3.77–5.28)
WBC # BLD AUTO: 18.81 10*3/MM3 (ref 3.4–10.8)

## 2021-06-09 PROCEDURE — 94799 UNLISTED PULMONARY SVC/PX: CPT

## 2021-06-09 PROCEDURE — 85027 COMPLETE CBC AUTOMATED: CPT | Performed by: OBSTETRICS & GYNECOLOGY

## 2021-06-09 RX ADMIN — LAMOTRIGINE 100 MG: 100 TABLET ORAL at 08:26

## 2021-06-09 RX ADMIN — OXYCODONE HYDROCHLORIDE AND ACETAMINOPHEN 1 TABLET: 5; 325 TABLET ORAL at 16:29

## 2021-06-09 RX ADMIN — BUSPIRONE HYDROCHLORIDE 15 MG: 15 TABLET ORAL at 16:29

## 2021-06-09 RX ADMIN — OXYCODONE HYDROCHLORIDE AND ACETAMINOPHEN 1 TABLET: 5; 325 TABLET ORAL at 05:58

## 2021-06-09 RX ADMIN — SODIUM CHLORIDE, POTASSIUM CHLORIDE, SODIUM LACTATE AND CALCIUM CHLORIDE 120 ML/HR: 600; 310; 30; 20 INJECTION, SOLUTION INTRAVENOUS at 00:32

## 2021-06-09 RX ADMIN — OXYCODONE HYDROCHLORIDE AND ACETAMINOPHEN 1 TABLET: 5; 325 TABLET ORAL at 10:47

## 2021-06-09 RX ADMIN — SODIUM CHLORIDE, POTASSIUM CHLORIDE, SODIUM LACTATE AND CALCIUM CHLORIDE 120 ML/HR: 600; 310; 30; 20 INJECTION, SOLUTION INTRAVENOUS at 08:29

## 2021-06-09 RX ADMIN — SIMETHICONE 80 MG: 80 TABLET, CHEWABLE ORAL at 04:51

## 2021-06-09 RX ADMIN — BRIMONIDINE TARTRATE 1 DROP: 1.5 SOLUTION OPHTHALMIC at 08:26

## 2021-06-09 RX ADMIN — BUSPIRONE HYDROCHLORIDE 15 MG: 15 TABLET ORAL at 20:22

## 2021-06-09 RX ADMIN — LAMOTRIGINE 100 MG: 100 TABLET ORAL at 20:22

## 2021-06-09 RX ADMIN — SIMETHICONE 80 MG: 80 TABLET, CHEWABLE ORAL at 18:49

## 2021-06-09 RX ADMIN — BUSPIRONE HYDROCHLORIDE 15 MG: 15 TABLET ORAL at 08:26

## 2021-06-09 RX ADMIN — OXYCODONE HYDROCHLORIDE AND ACETAMINOPHEN 1 TABLET: 5; 325 TABLET ORAL at 23:59

## 2021-06-09 RX ADMIN — BRIMONIDINE TARTRATE 1 DROP: 1.5 SOLUTION OPHTHALMIC at 20:22

## 2021-06-09 NOTE — PROGRESS NOTES
"  Subjective     Subjective  Patient reports:  Pain is well controlled controlled with Current regimen.  She is  ambulating. Tolerating po -- normal.  Intake -- c/o of tolerating po solids.   Awaiting flatus.  Voiding -- without difficulty.    Objective     Objective  Physical Exam:  Incision: dressed   Lungs: normal   Abdomen: abdomen is soft without significant tenderness, masses, organomegaly or guarding.   Extremities:  extremities normal, atraumatic, no cyanosis or edema           Vital Sign Min/Max    Temp  Min: 97.3 °F (36.3 °C)  Max: 99.1 °F (37.3 °C)   Pulse  Min: 69  Max: 108   Resp  Min: 14  Max: 27   BP  Min: 123/73  Max: 180/109   No data recorded   SpO2  Min: 94 %  Max: 100 %   Flow (L/min)  Min: 2  Max: 4     Flowsheet Rows      First Filed Value   Admission Height  165.1 cm (65\") Documented at 06/03/2021 1618   Admission Weight  78.5 kg (173 lb) Documented at 06/03/2021 1618          Intake/Output last 24 hours:    Intake/Output Summary (Last 24 hours) at 6/9/2021 1016  Last data filed at 6/9/2021 0745  Gross per 24 hour   Intake 1243 ml   Output 3675 ml   Net -2432 ml       Intake/Output this shift:  I/O this shift:  In: 300 [P.O.:300]  Out: -     Labs/Studies reviewed:  Yes   Radiology studies reviewed:   None to review  Medications reviewed:   Yes     Assessment/Plan   Diagnoses and all orders for this visit:    1. Chronic pelvic pain in female  -     ceFAZolin in dextrose (ANCEF) IVPB solution 2 g  -     phenazopyridine (PYRIDIUM) tablet 200 mg    2. Fibroids, intramural  -     phenazopyridine (PYRIDIUM) tablet 200 mg    3. Fibroids  -     Type & Screen; Standing  -     Type & Screen  -     Tissue Pathology Exam; Standing  -     Tissue Pathology Exam  -     Hemoglobin & Hematocrit, Blood; Standing  -     Hemoglobin & Hematocrit, Blood  -     ABO RH Specimen Verification; Standing  -     ABO RH Specimen Verification    Other orders  -     Pregnancy, Urine - Urine, Clean Catch; Standing  -     " Cancel: Vital Signs - Per Anesthesia Protocol; Standing  -     Cancel: Pulse Oximetry, Continuous; Standing  -     Cancel: Insert Peripheral IV; Standing  -     Cancel: Saline Lock & Maintain IV Access; Standing  -     Discontinue: sodium chloride 0.9 % flush 10 mL  -     Discontinue: sodium chloride 0.9 % flush 10 mL  -     Discontinue: lactated ringers infusion  -     acetaminophen (TYLENOL) tablet 1,000 mg  -     midazolam (VERSED) injection 2 mg  -     glycopyrrolate (ROBINUL) injection 0.2 mg  -     Cancel: Pulse Oximetry, Continuous  -     Cancel: Insert Peripheral IV  -     Cancel: Saline Lock & Maintain IV Access  -     Pregnancy, Urine - Urine, Clean Catch  -     Cancel: Follow Anesthesia Guidelines / Protocol; Standing  -     CBC & Differential; Standing  -     Cancel: Obtain Informed Consent; Standing  -     Cancel: Verify / Perform Chlorhexidine Skin Prep; Standing  -     Cancel: Verify NPO Status; Standing  -     Cancel: Follow Anesthesia Guidelines / Protocol  -     CBC & Differential  -     Cancel: Obtain Informed Consent  -     Cancel: Verify NPO Status  -     Cancel: Oxygen Therapy- Nasal Cannula; Titrate for SPO2: 90% - 95%; Standing  -     Cancel: Pulse Oximetry, Continuous; Standing  -     Cancel: Cardiac Monitoring; Standing  -     Cancel: Vital Signs Every 5 Minutes for 15 Minutes, Every 15 Minutes Thereafter.; Standing  -     Cancel: Apply Warming [place]; Standing  -     Instruct Patient or Nursing Staff to Remove Scopolamine Patch 24 Hours After Application; Standing  -     Cancel: Suction; Standing  -     Cancel: Notify Anesthesia of Any Acute Changes in Patient Condition; Standing  -     Cancel: Notify Anesthesia for Unrelieved Pain; Standing  -     Discontinue: oxyCODONE (ROXICODONE) immediate release tablet 5 mg  -     Discontinue: HYDROmorphone (DILAUDID) injection 0.25 mg  -     Discontinue: HYDROmorphone (DILAUDID) injection 0.5 mg  -     Discontinue: ondansetron (ZOFRAN) injection 4  mg  -     Discontinue: meperidine (DEMEROL) injection 12.5 mg  -     Cancel: Once Discharge Criteria to Floor Met, Follow Surgeon Orders; Standing  -     Cancel: Discharge Patient From PACU When Discharge Criteria Met; Standing  -     Discontinue: sterile water irrigation solution  -     Discontinue: sodium chloride (NS) irrigation solution  -     Prepare RBC, 1 Units; Standing  -     Prepare RBC, 1 Units  -     Instruct Patient or Nursing Staff to Remove Scopolamine Patch 24 Hours After Application  -     Cancel: Oxygen Therapy- Nasal Cannula; Titrate for SPO2: 90% - 95%  -     Cancel: Pulse Oximetry, Continuous  -     Cancel: Cardiac Monitoring  -     Cancel: Vital Signs Every 5 Minutes for 15 Minutes, Every 15 Minutes Thereafter.  -     Cancel: Apply Warming [place]  -     Cancel: Notify Anesthesia of Any Acute Changes in Patient Condition  -     Cancel: Notify Anesthesia for Unrelieved Pain  -     Cancel: Once Discharge Criteria to Floor Met, Follow Surgeon Orders  -     Cancel: Discharge Patient From PACU When Discharge Criteria Met  -     brimonidine (ALPHAGAN) 0.15 % ophthalmic solution 1 drop  -     busPIRone (BUSPAR) tablet 15 mg  -     lamoTRIgine (LaMICtal) tablet 100 mg  -     Code Status and Medical Interventions:; Standing  -     Vital Signs Per Hospital Policy; Standing  -     Turn Cough Deep Breathe; Standing  -     Incentive Spirometry; Standing  -     Advance Diet as Tolerated; Standing  -     Inpatient Admission; Standing  -     Place Sequential Compression Device; Standing  -     Maintain Sequential Compression Device; Standing  -     Remove Vaginal Packing; Standing  -     Continue Indwelling Urinary Catheter Already in Place; Standing  -     Discontinue Indwelling Urinary Catheter in AM; Standing  -     Bladder Scan if Patient Unable to Void 4-6 Hours After Catheter Removal; Standing  -     Straight Cath Every 4-6 Hours As Needed If Patient is Unable to Void After 4-6 Hours, Bladder Scan Volume  is Greater Than 500mL & Patient Has Symptoms of Bladder Discomfort / Distention; Standing  -     Notify Provider if Bladder Distention Continues; Standing  -     Consult Pharmacist For Review of Medications That May Cause Urinary Retention - RN To Place Order for Consult it Needed; Standing  -     Schedule / Prompt Voiding For Patients With Urinary Incontinence; Standing  -     Cancel: Urinary Catheter Care; Standing  -     Activity - Ad Emmy; Standing  -     Cancel: Diet Clear Liquid; Standing  -     CBC (No Diff); Standing  -     lactated ringers infusion  -     ketorolac (TORADOL) injection 15 mg  -     oxyCODONE-acetaminophen (PERCOCET) 5-325 MG per tablet 1 tablet  -     bisacodyl (DULCOLAX) EC tablet 10 mg  -     Assess Respiratory Rate, Pain Score & Sedation Level Using [place] Opioid-Sedation Scale (POSS); Standing  -     Notify Provider for Inadequate Pain Control, Excessive Sedation or Other Issues Regarding PCA Therapy; Standing  -     Opioid Administration - Capnography (EtCO2) Monitoring; Standing  -     Opioid Administration - Document EtCO2 Value With Each Set of Vitals & Any Change in Patient Status; Standing  -     Opioid Administration - Notify Provider Capnography (EtCO2); Standing  -     naloxone (NARCAN) injection 0.1 mg  -     Morphine sulfate PCA 1 mg/mL 50 mL bag  -     Hemoglobin & Hematocrit, Blood; Standing  -     Inpatient Admission  -     Code Status and Medical Interventions:  -     Vital Signs Per Hospital Policy  -     Turn Cough Deep Breathe  -     Incentive Spirometry  -     Incentive Spirometry  -     Incentive Spirometry  -     Incentive Spirometry  -     Incentive Spirometry  -     Incentive Spirometry  -     Incentive Spirometry  -     Incentive Spirometry  -     Incentive Spirometry  -     Advance Diet as Tolerated  -     Place Sequential Compression Device  -     Maintain Sequential Compression Device  -     Continue Indwelling Urinary Catheter Already in Place  -     Notify  Provider if Bladder Distention Continues  -     Cancel: Urinary Catheter Care  -     Activity - Ad Emmy  -     Cancel: Diet Clear Liquid  -     Assess Respiratory Rate, Pain Score & Sedation Level Using [place] Opioid-Sedation Scale (POSS)  -     Notify Provider for Inadequate Pain Control, Excessive Sedation or Other Issues Regarding PCA Therapy  -     Opioid Administration - Capnography (EtCO2) Monitoring  -     Opioid Administration - Document EtCO2 Value With Each Set of Vitals & Any Change in Patient Status  -     Opioid Administration - Notify Provider Capnography (EtCO2)  -     Hemoglobin & Hematocrit, Blood  -     Incentive Spirometry  -     Opioid Administration - Capnography (EtCO2) Monitoring; Standing  -     Opioid Administration - Document EtCO2 Value With Each Set of Vitals & Any Change in Patient Status; Standing  -     Opioid Administration - Notify Provider Capnography (EtCO2); Standing  -     Opioid Administration - Capnography (EtCO2) Monitoring; Standing  -     Opioid Administration - Document EtCO2 Value With Each Set of Vitals & Any Change in Patient Status; Standing  -     Opioid Administration - Notify Provider Capnography (EtCO2); Standing  -     Opioid Administration - Capnography (EtCO2) Monitoring; Standing  -     Opioid Administration - Document EtCO2 Value With Each Set of Vitals & Any Change in Patient Status; Standing  -     Opioid Administration - Notify Provider Capnography (EtCO2); Standing  -     Incentive Spirometry  -     Incentive Spirometry  -     Incentive Spirometry  -     simethicone (MYLICON) chewable tablet 80 mg  -     Incentive Spirometry  -     CBC (No Diff)  -     Remove Vaginal Packing  -     Discontinue Indwelling Urinary Catheter in AM  -     Diet Regular; Standing  -     Diet Regular  -     Incentive Spirometry  -     Incentive Spirometry  -     Incentive Spirometry        All questions answered.      Chronic pelvic pain in female      Post op day 1  procedure(s):  CYSTOSCOPY  DIAGNOSTIC LAPAROSCOPY TOTAL ABDOMINAL HYSTERECTOMY, BILATERAL SALPINGECTOMY Doing well postoperatively..    Plan:  Remove dressing continue oral pain medicines add encourage ambulation, i/s; sitz baths, bladder training, voiding trial, remove vaginal packing, dc PCA,               Cliff Pillai MD  06/09/21  10:16 EDT

## 2021-06-09 NOTE — PLAN OF CARE
Goal Outcome Evaluation:         Patient has complained of slight gas pain. No additional concerns from patient. Patient showered and had dressing/IV sites removed per md orders- -Ras Cao RN

## 2021-06-09 NOTE — PLAN OF CARE
Goal Outcome Evaluation:  Plan of Care Reviewed With: patient        Progress: improving, pardo and vaginal packing pulled. Pt tolerated. Pt wanting to get up to go to bathroom. Staff assist.

## 2021-06-10 VITALS
HEART RATE: 90 BPM | TEMPERATURE: 98 F | SYSTOLIC BLOOD PRESSURE: 139 MMHG | WEIGHT: 169.97 LBS | HEIGHT: 65 IN | OXYGEN SATURATION: 99 % | RESPIRATION RATE: 20 BRPM | BODY MASS INDEX: 28.32 KG/M2 | DIASTOLIC BLOOD PRESSURE: 86 MMHG

## 2021-06-10 PROCEDURE — 94799 UNLISTED PULMONARY SVC/PX: CPT

## 2021-06-10 RX ORDER — SIMETHICONE 180 MG
180 CAPSULE ORAL 4 TIMES DAILY PRN
Qty: 30 CAPSULE | Refills: 1 | Status: SHIPPED | OUTPATIENT
Start: 2021-06-10 | End: 2021-11-17

## 2021-06-10 RX ORDER — OXYCODONE HYDROCHLORIDE AND ACETAMINOPHEN 5; 325 MG/1; MG/1
1 TABLET ORAL EVERY 4 HOURS PRN
Qty: 15 TABLET | Refills: 0 | Status: SHIPPED | OUTPATIENT
Start: 2021-06-10 | End: 2021-06-15

## 2021-06-10 RX ORDER — BISACODYL 5 MG/1
10 TABLET, DELAYED RELEASE ORAL DAILY PRN
Qty: 30 TABLET | Refills: 1 | Status: SHIPPED | OUTPATIENT
Start: 2021-06-10 | End: 2021-11-17

## 2021-06-10 RX ORDER — IBUPROFEN 600 MG/1
600 TABLET ORAL EVERY 6 HOURS PRN
Qty: 30 TABLET | Refills: 1 | Status: SHIPPED | OUTPATIENT
Start: 2021-06-10

## 2021-06-10 RX ADMIN — LAMOTRIGINE 100 MG: 100 TABLET ORAL at 08:33

## 2021-06-10 RX ADMIN — BUSPIRONE HYDROCHLORIDE 15 MG: 15 TABLET ORAL at 08:33

## 2021-06-10 RX ADMIN — OXYCODONE HYDROCHLORIDE AND ACETAMINOPHEN 1 TABLET: 5; 325 TABLET ORAL at 08:33

## 2021-06-10 RX ADMIN — SIMETHICONE 80 MG: 80 TABLET, CHEWABLE ORAL at 01:19

## 2021-06-10 RX ADMIN — BRIMONIDINE TARTRATE 1 DROP: 1.5 SOLUTION OPHTHALMIC at 08:33

## 2021-06-10 NOTE — PLAN OF CARE
Goal Outcome Evaluation:  Plan of Care Reviewed With: patient           Outcome Summary: vitals stable. po pain meds managing pain. voiding well tolerating diet well.  simethicone helping gas pains

## 2021-06-10 NOTE — DISCHARGE SUMMARY
[facility]               GYN DISCHARGE SUMMARY    Patient Name: Bhargavi Jeronimo  : 1985  MRN: [id number]    Date of Admission: 2021  Date of Discharge:  6/10/2021  Primary Care Physician: Provider, No Known    Consults     No orders found from 5/10/2021 to 2021.          Presenting Problem:   Chronic pelvic pain in female [R10.2, G89.29]    Active and Resolved Hospital Problems:  Active Hospital Problems    Diagnosis POA   • Chronic pelvic pain in female [R10.2, G89.29] Yes      Resolved Hospital Problems   No resolved problems to display.         Hospital Course     Hospital Course:  Bhargavi Jeronimo is a 35 y.o. female    Procedure:  CYSTOSCOPY, DIAGNOSTIC LAPAROSCOPY TOTAL ABDOMINAL HYSTERECTOMY    Post-op Diagnosis:     Post-Op Diagnosis Codes:  Chronic pelvic pain    Day of Discharge     Vital Signs:  Temp:  [98 °F (36.7 °C)-98.9 °F (37.2 °C)] 98 °F (36.7 °C)  Heart Rate:  [] 90  Resp:  [15-20] 20  BP: (139-182)/(74-96) 139/86    Pertinent  and/or Most Recent Results     LAB RESULTS:      Lab 21  0548 21  1408 21  1009 21  0714   WBC 18.81*  --   --  9.92   HEMOGLOBIN 10.0* 12.0 10.4* 12.9   HEMATOCRIT 29.4* 37.4 31.7* 38.7   PLATELETS 249  --   --  272   NEUTROS ABS  --   --   --  5.24   IMMATURE GRANS (ABS)  --   --   --  0.02   LYMPHS ABS  --   --   --  3.75*   MONOS ABS  --   --   --  0.75   EOS ABS  --   --   --  0.11   MCV 85.2  --   --  86.8                         Lab 21  0916   ABO TYPING B  B   RH TYPING Positive  Positive   ANTIBODY SCREEN Negative         Brief Urine Lab Results  (Last result in the past 365 days)      Color   Clarity   Blood   Leuk Est   Nitrite   Protein   CREAT   Urine HCG        21 0711               Negative         Microbiology Results (last 10 days)     Procedure Component Value - Date/Time    CONV CORONAVIRUS COVID-19, REF - , [[id number]] Collected: 21 1827    Lab Status: Final result  Updated: 06/05/21 1140     Coronavirus (COVID-19) NOT DETECTED NA      Comment: The SARS-CoV-2 assay is a real-time, RT-PCR test intended  for the qualitative detection of nucleic acid from the  SARS-CoV-2 in respiratory specimens from individuals,  testing performed at [facility]                                Labs Pending at Discharge:  Pending Labs     Order Current Status    Tissue Pathology Exam In process            Discharge Details        Discharge Medications      New Medications      Instructions Start Date   bisacodyl 5 MG EC tablet  Commonly known as: DULCOLAX   10 mg, Oral, Daily PRN      ibuprofen 600 MG tablet  Commonly known as: ADVIL,MOTRIN   600 mg, Oral, Every 6 Hours PRN      oxyCODONE-acetaminophen 5-325 MG per tablet  Commonly known as: PERCOCET   1 tablet, Oral, Every 4 Hours PRN      simethicone 80 MG chewable tablet  Commonly known as: MYLICON   80 mg, Oral, 4 Times Daily PRN         Continue These Medications      Instructions Start Date   brimonidine 0.2 % ophthalmic solution  Commonly known as: ALPHAGAN   1 drop, Both Eyes, 2 times daily      buPROPion 100 MG tablet  Commonly known as: WELLBUTRIN   150 mg, Oral, Daily      busPIRone 15 MG tablet  Commonly known as: BUSPAR   15 mg, Oral, 3 Times Daily, Usually take at night x1       cetirizine 10 MG tablet  Commonly known as: zyrTEC   10 mg, Oral, Daily      Humira 40 MG/0.4ML Prefilled Syringe Kit injection  Generic drug: Adalimumab   40 mg, Subcutaneous, Every 10 Days      lamoTRIgine 100 MG tablet  Commonly known as: LaMICtal   100 mg, Oral, 2 Times Daily      multivitamin with minerals tablet tablet   1 tablet, Oral, Daily      prednisoLONE acetate 0.12 % ophthalmic suspension  Commonly known as: PRED MILD   1 drop, 2 times daily      traZODone 100 MG tablet  Commonly known as: DESYREL   150 mg, Oral, Nightly             Allergies   Allergen Reactions   • Latex Hives     Tree nuts and peanuts   • Nuts Anaphylaxis          Discharge Disposition:  Home or Self Care    Diet:        Discharge Activity:         No future appointments.          Electronically signed by Cliff Pillai MD, 06/10/21, 1:34 PM EDT.                 [facility]               GYN DISCHARGE SUMMARY    Patient Name: Bhargavi Jeronimo  : 1985  MRN: [id number]    Date of Admission: 2021  Date of Discharge:  6/10/2021  Primary Care Physician: Provider, No Known    Consults     No orders found from 5/10/2021 to 2021.          Presenting Problem:   Chronic pelvic pain in female [R10.2, G89.29]    Active and Resolved Hospital Problems:  Active Hospital Problems    Diagnosis POA   • Chronic pelvic pain in female [R10.2, G89.29] Yes      Resolved Hospital Problems   No resolved problems to display.         Hospital Course     Hospital Course:  Bhargavi Jeronimo is a 35 y.o. female     Procedure:  CYSTOSCOPY, DIAGNOSTIC LAPAROSCOPY TOTAL ABDOMINAL HYSTERECTOMY    Post-op Diagnosis:     Post-Op Diagnosis Codes:   Chronic pelvic pain    Day of Discharge     Vital Signs:  Temp:  [98 °F (36.7 °C)-98.9 °F (37.2 °C)] 98 °F (36.7 °C)  Heart Rate:  [] 90  Resp:  [15-20] 20  BP: (139-182)/(74-96) 139/86    Pertinent  and/or Most Recent Results     LAB RESULTS:      Lab 21  0548 21  1408 21  1009 21  0714   WBC 18.81*  --   --  9.92   HEMOGLOBIN 10.0* 12.0 10.4* 12.9   HEMATOCRIT 29.4* 37.4 31.7* 38.7   PLATELETS 249  --   --  272   NEUTROS ABS  --   --   --  5.24   IMMATURE GRANS (ABS)  --   --   --  0.02   LYMPHS ABS  --   --   --  3.75*   MONOS ABS  --   --   --  0.75   EOS ABS  --   --   --  0.11   MCV 85.2  --   --  86.8                         Lab 21  0916   ABO TYPING B  B   RH TYPING Positive  Positive   ANTIBODY SCREEN Negative         Brief Urine Lab Results  (Last result in the past 365 days)      Color   Clarity   Blood   Leuk Est   Nitrite   Protein   CREAT   Urine HCG        21 0711                Negative         Microbiology Results (last 10 days)     Procedure Component Value - Date/Time    CONV CORONAVIRUS COVID-19, REF - , [[id number]] Collected: 06/03/21 1827    Lab Status: Final result Updated: 06/05/21 1140     Coronavirus (COVID-19) NOT DETECTED NA      Comment: The SARS-CoV-2 assay is a real-time, RT-PCR test intended  for the qualitative detection of nucleic acid from the  SARS-CoV-2 in respiratory specimens from individuals,  testing performed at [facility].                                Labs Pending at Discharge:  Pending Labs     Order Current Status    Tissue Pathology Exam In process            Discharge Details        Discharge Medications      New Medications      Instructions Start Date   bisacodyl 5 MG EC tablet  Commonly known as: DULCOLAX   10 mg, Oral, Daily PRN      ibuprofen 600 MG tablet  Commonly known as: ADVIL,MOTRIN   600 mg, Oral, Every 6 Hours PRN      oxyCODONE-acetaminophen 5-325 MG per tablet  Commonly known as: PERCOCET   1 tablet, Oral, Every 4 Hours PRN      simethicone 80 MG chewable tablet  Commonly known as: MYLICON   80 mg, Oral, 4 Times Daily PRN         Continue These Medications      Instructions Start Date   brimonidine 0.2 % ophthalmic solution  Commonly known as: ALPHAGAN   1 drop, Both Eyes, 2 times daily      buPROPion 100 MG tablet  Commonly known as: WELLBUTRIN   150 mg, Oral, Daily      busPIRone 15 MG tablet  Commonly known as: BUSPAR   15 mg, Oral, 3 Times Daily, Usually take at night x1       cetirizine 10 MG tablet  Commonly known as: zyrTEC   10 mg, Oral, Daily      Humira 40 MG/0.4ML Prefilled Syringe Kit injection  Generic drug: Adalimumab   40 mg, Subcutaneous, Every 10 Days      lamoTRIgine 100 MG tablet  Commonly known as: LaMICtal   100 mg, Oral, 2 Times Daily      multivitamin with minerals tablet tablet   1 tablet, Oral, Daily      prednisoLONE acetate 0.12 % ophthalmic suspension  Commonly known as: PRED MILD   1 drop, 2  times daily      traZODone 100 MG tablet  Commonly known as: DESYREL   150 mg, Oral, Nightly             Allergies   Allergen Reactions   • Latex Hives     Tree nuts and peanuts   • Nuts Anaphylaxis         Discharge Disposition:  Home or Self Care    Diet:        Discharge Activity:         No future appointments.          Electronically signed by Cliff Pillai MD, 06/10/21, 1:37 PM EDT.                 [facility]               GYN DISCHARGE SUMMARY    Patient Name: Bhargavi Jeronimo  : 1985  MRN: [id number]    Date of Admission: 2021  Date of Discharge:  6/10/2021  Primary Care Physician: Provider, No Known    Consults     No orders found from 5/10/2021 to 2021.          Presenting Problem:   Chronic pelvic pain in female [R10.2, G89.29]    Active and Resolved Hospital Problems:  Active Hospital Problems    Diagnosis POA   • Chronic pelvic pain in female [R10.2, G89.29] Yes      Resolved Hospital Problems   No resolved problems to display.         Hospital Course     Hospital Course:  Bhargavi Jeronimo is a 35 y.o. female      Procedure:  CYSTOSCOPY, DIAGNOSTIC LAPAROSCOPY TOTAL ABDOMINAL HYSTERECTOMY    Post-op Diagnosis:     Post-Op Diagnosis Codes:  CPP    Day of Discharge     Vital Signs:  Temp:  [98 °F (36.7 °C)-98.9 °F (37.2 °C)] 98 °F (36.7 °C)  Heart Rate:  [] 90  Resp:  [15-20] 20  BP: (139-182)/(74-96) 139/86    Pertinent  and/or Most Recent Results     LAB RESULTS:      Lab 21  0548 21  1408 21  1009 21  0714   WBC 18.81*  --   --  9.92   HEMOGLOBIN 10.0* 12.0 10.4* 12.9   HEMATOCRIT 29.4* 37.4 31.7* 38.7   PLATELETS 249  --   --  272   NEUTROS ABS  --   --   --  5.24   IMMATURE GRANS (ABS)  --   --   --  0.02   LYMPHS ABS  --   --   --  3.75*   MONOS ABS  --   --   --  0.75   EOS ABS  --   --   --  0.11   MCV 85.2  --   --  86.8                         Lab 21  0916   ABO TYPING B  B   RH TYPING Positive  Positive   ANTIBODY SCREEN  Negative         Brief Urine Lab Results  (Last result in the past 365 days)      Color   Clarity   Blood   Leuk Est   Nitrite   Protein   CREAT   Urine HCG        06/08/21 0711               Negative         Microbiology Results (last 10 days)     Procedure Component Value - Date/Time    CONV CORONAVIRUS COVID-19, REF - , [[id number]] Collected: 06/03/21 1827    Lab Status: Final result Updated: 06/05/21 1140     Coronavirus (COVID-19) NOT DETECTED NA      Comment: The SARS-CoV-2 assay is a real-time, RT-PCR test intended  for the qualitative detection of nucleic acid from the  SARS-CoV-2 in respiratory specimens from individuals,  testing performed at [facility].                                Labs Pending at Discharge:  Pending Labs     Order Current Status    Tissue Pathology Exam In process            Discharge Details        Discharge Medications      New Medications      Instructions Start Date   bisacodyl 5 MG EC tablet  Commonly known as: DULCOLAX   10 mg, Oral, Daily PRN      ibuprofen 600 MG tablet  Commonly known as: ADVIL,MOTRIN   600 mg, Oral, Every 6 Hours PRN      oxyCODONE-acetaminophen 5-325 MG per tablet  Commonly known as: PERCOCET   1 tablet, Oral, Every 4 Hours PRN      simethicone 80 MG chewable tablet  Commonly known as: MYLICON   80 mg, Oral, 4 Times Daily PRN         Continue These Medications      Instructions Start Date   brimonidine 0.2 % ophthalmic solution  Commonly known as: ALPHAGAN   1 drop, Both Eyes, 2 times daily      buPROPion 100 MG tablet  Commonly known as: WELLBUTRIN   150 mg, Oral, Daily      busPIRone 15 MG tablet  Commonly known as: BUSPAR   15 mg, Oral, 3 Times Daily, Usually take at night x1       cetirizine 10 MG tablet  Commonly known as: zyrTEC   10 mg, Oral, Daily      Humira 40 MG/0.4ML Prefilled Syringe Kit injection  Generic drug: Adalimumab   40 mg, Subcutaneous, Every 10 Days      lamoTRIgine 100 MG tablet  Commonly known as: LaMICtal   100 mg, Oral,  2 Times Daily      multivitamin with minerals tablet tablet   1 tablet, Oral, Daily      prednisoLONE acetate 0.12 % ophthalmic suspension  Commonly known as: PRED MILD   1 drop, 2 times daily      traZODone 100 MG tablet  Commonly known as: DESYREL   150 mg, Oral, Nightly             Allergies   Allergen Reactions   • Latex Hives     Tree nuts and peanuts   • Nuts Anaphylaxis         Discharge Disposition:  Home or Self Care    Diet:        Discharge Activity:         No future appointments.          Electronically signed by Cliff Pillai MD, 06/10/21, 1:37 PM EDT.

## 2021-06-11 LAB
CYTO UR: NORMAL
LAB AP CASE REPORT: NORMAL
LAB AP CLINICAL INFORMATION: NORMAL
PATH REPORT.FINAL DX SPEC: NORMAL
PATH REPORT.GROSS SPEC: NORMAL

## 2021-06-29 ENCOUNTER — APPOINTMENT (OUTPATIENT)
Dept: CT IMAGING | Facility: HOSPITAL | Age: 36
End: 2021-06-29

## 2021-06-29 ENCOUNTER — HOSPITAL ENCOUNTER (OUTPATIENT)
Facility: HOSPITAL | Age: 36
Discharge: HOME | End: 2021-06-30
Attending: EMERGENCY MEDICINE | Admitting: OBSTETRICS & GYNECOLOGY

## 2021-06-29 ENCOUNTER — LAB REQUISITION (OUTPATIENT)
Dept: LAB | Facility: HOSPITAL | Age: 36
End: 2021-06-29

## 2021-06-29 DIAGNOSIS — D25.1 FIBROIDS, INTRAMURAL: ICD-10-CM

## 2021-06-29 DIAGNOSIS — R30.0 DYSURIA: ICD-10-CM

## 2021-06-29 DIAGNOSIS — R10.2 CHRONIC PELVIC PAIN IN FEMALE: ICD-10-CM

## 2021-06-29 DIAGNOSIS — N83.511 TORSION OF RIGHT OVARY AND OVARIAN PEDICLE: Primary | ICD-10-CM

## 2021-06-29 DIAGNOSIS — G89.29 CHRONIC PELVIC PAIN IN FEMALE: ICD-10-CM

## 2021-06-29 LAB
ALBUMIN SERPL-MCNC: 4.9 G/DL (ref 3.5–5.2)
ALBUMIN/GLOB SERPL: 1.4 G/DL
ALP SERPL-CCNC: 107 U/L (ref 39–117)
ALT SERPL W P-5'-P-CCNC: 14 U/L (ref 1–33)
ANION GAP SERPL CALCULATED.3IONS-SCNC: 17.4 MMOL/L (ref 5–15)
ANISOCYTOSIS BLD QL: ABNORMAL
AST SERPL-CCNC: 21 U/L (ref 1–32)
BILIRUB SERPL-MCNC: 0.3 MG/DL (ref 0–1.2)
BILIRUB UR QL STRIP: NEGATIVE
BUN SERPL-MCNC: 4 MG/DL (ref 6–20)
BUN/CREAT SERPL: 5.8 (ref 7–25)
CALCIUM SPEC-SCNC: 9.9 MG/DL (ref 8.6–10.5)
CHLORIDE SERPL-SCNC: 97 MMOL/L (ref 98–107)
CLARITY UR: CLEAR
CO2 SERPL-SCNC: 24.6 MMOL/L (ref 22–29)
COLOR UR: YELLOW
CREAT SERPL-MCNC: 0.69 MG/DL (ref 0.57–1)
DEPRECATED RDW RBC AUTO: 45.7 FL (ref 37–54)
ERYTHROCYTE [DISTWIDTH] IN BLOOD BY AUTOMATED COUNT: 14.1 % (ref 12.3–15.4)
GFR SERPL CREATININE-BSD FRML MDRD: 116 ML/MIN/1.73
GLOBULIN UR ELPH-MCNC: 3.6 GM/DL
GLUCOSE SERPL-MCNC: 89 MG/DL (ref 65–99)
GLUCOSE UR STRIP-MCNC: NEGATIVE MG/DL
HCT VFR BLD AUTO: 35.9 % (ref 34–46.6)
HGB BLD-MCNC: 11.6 G/DL (ref 12–15.9)
HGB UR QL STRIP.AUTO: NEGATIVE
HOLD SPECIMEN: NORMAL
HOLD SPECIMEN: NORMAL
HYPOCHROMIA BLD QL: ABNORMAL
KETONES UR QL STRIP: NEGATIVE
LEUKOCYTE ESTERASE UR QL STRIP.AUTO: NEGATIVE
LIPASE SERPL-CCNC: 25 U/L (ref 13–60)
LYMPHOCYTES # BLD MANUAL: 4.62 10*3/MM3 (ref 0.7–3.1)
LYMPHOCYTES NFR BLD MANUAL: 2 % (ref 5–12)
LYMPHOCYTES NFR BLD MANUAL: 22 % (ref 19.6–45.3)
MCH RBC QN AUTO: 29.1 PG (ref 26.6–33)
MCHC RBC AUTO-ENTMCNC: 32.3 G/DL (ref 31.5–35.7)
MCV RBC AUTO: 90 FL (ref 79–97)
MONOCYTES # BLD AUTO: 0.24 10*3/MM3 (ref 0.1–0.9)
NEUTROPHILS # BLD AUTO: 7.3 10*3/MM3 (ref 1.7–7)
NEUTROPHILS NFR BLD MANUAL: 60 % (ref 42.7–76)
NITRITE UR QL STRIP: NEGATIVE
NRBC BLD AUTO-RTO: 0 /100 WBC (ref 0–0.2)
PH UR STRIP.AUTO: 7 [PH] (ref 5–8)
PLATELET # BLD AUTO: 574 10*3/MM3 (ref 140–450)
PMV BLD AUTO: 9 FL (ref 6–12)
POTASSIUM SERPL-SCNC: 4.1 MMOL/L (ref 3.5–5.2)
PROT SERPL-MCNC: 8.5 G/DL (ref 6–8.5)
PROT UR QL STRIP: NEGATIVE
RBC # BLD AUTO: 3.99 10*6/MM3 (ref 3.77–5.28)
SMALL PLATELETS BLD QL SMEAR: ABNORMAL
SMUDGE CELLS BLD QL SMEAR: ABNORMAL
SODIUM SERPL-SCNC: 139 MMOL/L (ref 136–145)
SP GR UR STRIP: <=1.005 (ref 1–1.03)
UROBILINOGEN UR QL STRIP: NORMAL
VARIANT LYMPHS NFR BLD MANUAL: 16 % (ref 0–5)
WBC # BLD AUTO: 12.16 10*3/MM3 (ref 3.4–10.8)
WHOLE BLOOD HOLD SPECIMEN: NORMAL

## 2021-06-29 PROCEDURE — 36415 COLL VENOUS BLD VENIPUNCTURE: CPT

## 2021-06-29 PROCEDURE — 85025 COMPLETE CBC W/AUTO DIFF WBC: CPT

## 2021-06-29 PROCEDURE — 85007 BL SMEAR W/DIFF WBC COUNT: CPT

## 2021-06-29 PROCEDURE — 74177 CT ABD & PELVIS W/CONTRAST: CPT

## 2021-06-29 PROCEDURE — 81003 URINALYSIS AUTO W/O SCOPE: CPT

## 2021-06-29 PROCEDURE — 83690 ASSAY OF LIPASE: CPT

## 2021-06-29 PROCEDURE — 87086 URINE CULTURE/COLONY COUNT: CPT | Performed by: OBSTETRICS & GYNECOLOGY

## 2021-06-29 PROCEDURE — 80053 COMPREHEN METABOLIC PANEL: CPT

## 2021-06-29 PROCEDURE — 99284 EMERGENCY DEPT VISIT MOD MDM: CPT

## 2021-06-29 RX ORDER — KETOROLAC TROMETHAMINE 30 MG/ML
30 INJECTION, SOLUTION INTRAMUSCULAR; INTRAVENOUS ONCE
Status: COMPLETED | OUTPATIENT
Start: 2021-06-29 | End: 2021-06-30

## 2021-06-29 RX ORDER — SODIUM CHLORIDE 0.9 % (FLUSH) 0.9 %
10 SYRINGE (ML) INJECTION AS NEEDED
Status: DISCONTINUED | OUTPATIENT
Start: 2021-06-29 | End: 2021-06-30 | Stop reason: HOSPADM

## 2021-06-29 RX ADMIN — IOPAMIDOL 100 ML: 755 INJECTION, SOLUTION INTRAVENOUS at 23:45

## 2021-06-30 ENCOUNTER — PREP FOR SURGERY (OUTPATIENT)
Dept: OTHER | Facility: HOSPITAL | Age: 36
End: 2021-06-30

## 2021-06-30 ENCOUNTER — ANESTHESIA EVENT (OUTPATIENT)
Dept: PERIOP | Facility: HOSPITAL | Age: 36
End: 2021-06-30

## 2021-06-30 ENCOUNTER — APPOINTMENT (OUTPATIENT)
Dept: ULTRASOUND IMAGING | Facility: HOSPITAL | Age: 36
End: 2021-06-30

## 2021-06-30 ENCOUNTER — ANESTHESIA (OUTPATIENT)
Dept: PERIOP | Facility: HOSPITAL | Age: 36
End: 2021-06-30

## 2021-06-30 VITALS
SYSTOLIC BLOOD PRESSURE: 129 MMHG | OXYGEN SATURATION: 100 % | RESPIRATION RATE: 20 BRPM | BODY MASS INDEX: 27.44 KG/M2 | TEMPERATURE: 97.1 F | HEIGHT: 65 IN | HEART RATE: 87 BPM | WEIGHT: 164.68 LBS | DIASTOLIC BLOOD PRESSURE: 71 MMHG

## 2021-06-30 PROBLEM — N83.511 TORSION OF RIGHT OVARY AND OVARIAN PEDICLE: Status: ACTIVE | Noted: 2021-06-30

## 2021-06-30 PROBLEM — G89.29 CHRONIC PELVIC PAIN IN FEMALE: Status: RESOLVED | Noted: 2021-06-08 | Resolved: 2021-06-30

## 2021-06-30 PROBLEM — D25.1 FIBROIDS, INTRAMURAL: Status: ACTIVE | Noted: 2021-06-30

## 2021-06-30 PROBLEM — R10.2 CHRONIC PELVIC PAIN IN FEMALE: Status: RESOLVED | Noted: 2021-06-08 | Resolved: 2021-06-30

## 2021-06-30 PROBLEM — D25.1 FIBROIDS, INTRAMURAL: Status: RESOLVED | Noted: 2021-06-30 | Resolved: 2021-06-30

## 2021-06-30 LAB — BACTERIA SPEC AEROBE CULT: NORMAL

## 2021-06-30 PROCEDURE — 25010000002 DEXAMETHASONE PER 1 MG: Performed by: ANESTHESIOLOGY

## 2021-06-30 PROCEDURE — 25010000002 PROPOFOL 10 MG/ML EMULSION: Performed by: ANESTHESIOLOGY

## 2021-06-30 PROCEDURE — 25010000002 MIDAZOLAM PER 1 MG: Performed by: ANESTHESIOLOGY

## 2021-06-30 PROCEDURE — G0378 HOSPITAL OBSERVATION PER HR: HCPCS

## 2021-06-30 PROCEDURE — 25010000002 HYDROMORPHONE PER 4 MG: Performed by: NURSE ANESTHETIST, CERTIFIED REGISTERED

## 2021-06-30 PROCEDURE — 96374 THER/PROPH/DIAG INJ IV PUSH: CPT

## 2021-06-30 PROCEDURE — 25010000002 ONDANSETRON PER 1 MG: Performed by: NURSE ANESTHETIST, CERTIFIED REGISTERED

## 2021-06-30 PROCEDURE — 25010000002 FENTANYL CITRATE (PF) 50 MCG/ML SOLUTION: Performed by: ANESTHESIOLOGY

## 2021-06-30 PROCEDURE — 88305 TISSUE EXAM BY PATHOLOGIST: CPT | Performed by: OBSTETRICS & GYNECOLOGY

## 2021-06-30 PROCEDURE — 25010000002 HYDRALAZINE PER 20 MG: Performed by: ANESTHESIOLOGY

## 2021-06-30 PROCEDURE — 25010000002 KETOROLAC TROMETHAMINE PER 15 MG: Performed by: EMERGENCY MEDICINE

## 2021-06-30 PROCEDURE — 25010000002 KETOROLAC TROMETHAMINE PER 15 MG: Performed by: NURSE ANESTHETIST, CERTIFIED REGISTERED

## 2021-06-30 PROCEDURE — 0 IOPAMIDOL PER 1 ML: Performed by: EMERGENCY MEDICINE

## 2021-06-30 PROCEDURE — 76830 TRANSVAGINAL US NON-OB: CPT

## 2021-06-30 PROCEDURE — 25010000003 CEFAZOLIN IN DEXTROSE 2-4 GM/100ML-% SOLUTION: Performed by: OBSTETRICS & GYNECOLOGY

## 2021-06-30 RX ORDER — LIDOCAINE HYDROCHLORIDE 20 MG/ML
INJECTION, SOLUTION INFILTRATION; PERINEURAL AS NEEDED
Status: DISCONTINUED | OUTPATIENT
Start: 2021-06-30 | End: 2021-06-30 | Stop reason: SURG

## 2021-06-30 RX ORDER — PROMETHAZINE HYDROCHLORIDE 25 MG/1
25 SUPPOSITORY RECTAL ONCE AS NEEDED
Status: DISCONTINUED | OUTPATIENT
Start: 2021-06-30 | End: 2021-06-30 | Stop reason: HOSPADM

## 2021-06-30 RX ORDER — HYDRALAZINE HYDROCHLORIDE 20 MG/ML
INJECTION INTRAMUSCULAR; INTRAVENOUS AS NEEDED
Status: DISCONTINUED | OUTPATIENT
Start: 2021-06-30 | End: 2021-06-30 | Stop reason: SURG

## 2021-06-30 RX ORDER — CEFAZOLIN SODIUM 2 G/100ML
2 INJECTION, SOLUTION INTRAVENOUS ONCE
Status: COMPLETED | OUTPATIENT
Start: 2021-06-30 | End: 2021-06-30

## 2021-06-30 RX ORDER — DOCUSATE SODIUM 100 MG/1
100 CAPSULE, LIQUID FILLED ORAL DAILY
Qty: 30 CAPSULE | Refills: 1 | Status: SHIPPED | OUTPATIENT
Start: 2021-06-30 | End: 2021-11-17

## 2021-06-30 RX ORDER — DEXMEDETOMIDINE HYDROCHLORIDE 100 UG/ML
INJECTION, SOLUTION INTRAVENOUS AS NEEDED
Status: DISCONTINUED | OUTPATIENT
Start: 2021-06-30 | End: 2021-06-30 | Stop reason: SURG

## 2021-06-30 RX ORDER — ONDANSETRON 2 MG/ML
INJECTION INTRAMUSCULAR; INTRAVENOUS AS NEEDED
Status: DISCONTINUED | OUTPATIENT
Start: 2021-06-30 | End: 2021-06-30 | Stop reason: SURG

## 2021-06-30 RX ORDER — MEPERIDINE HYDROCHLORIDE 25 MG/ML
12.5 INJECTION INTRAMUSCULAR; INTRAVENOUS; SUBCUTANEOUS
Status: DISCONTINUED | OUTPATIENT
Start: 2021-06-30 | End: 2021-06-30 | Stop reason: HOSPADM

## 2021-06-30 RX ORDER — PROMETHAZINE HYDROCHLORIDE 12.5 MG/1
25 TABLET ORAL ONCE AS NEEDED
Status: DISCONTINUED | OUTPATIENT
Start: 2021-06-30 | End: 2021-06-30 | Stop reason: HOSPADM

## 2021-06-30 RX ORDER — ONDANSETRON 2 MG/ML
4 INJECTION INTRAMUSCULAR; INTRAVENOUS ONCE AS NEEDED
Status: DISCONTINUED | OUTPATIENT
Start: 2021-06-30 | End: 2021-06-30 | Stop reason: HOSPADM

## 2021-06-30 RX ORDER — OXYCODONE HYDROCHLORIDE AND ACETAMINOPHEN 5; 325 MG/1; MG/1
1 TABLET ORAL EVERY 6 HOURS PRN
Qty: 20 TABLET | Refills: 0 | Status: SHIPPED | OUTPATIENT
Start: 2021-06-30 | End: 2021-11-17

## 2021-06-30 RX ORDER — ROCURONIUM BROMIDE 10 MG/ML
INJECTION, SOLUTION INTRAVENOUS AS NEEDED
Status: DISCONTINUED | OUTPATIENT
Start: 2021-06-30 | End: 2021-06-30 | Stop reason: SURG

## 2021-06-30 RX ORDER — MAGNESIUM HYDROXIDE 1200 MG/15ML
LIQUID ORAL AS NEEDED
Status: DISCONTINUED | OUTPATIENT
Start: 2021-06-30 | End: 2021-06-30 | Stop reason: HOSPADM

## 2021-06-30 RX ORDER — SODIUM CHLORIDE 0.9 % (FLUSH) 0.9 %
3 SYRINGE (ML) INJECTION EVERY 12 HOURS SCHEDULED
Status: DISCONTINUED | OUTPATIENT
Start: 2021-06-30 | End: 2021-06-30 | Stop reason: HOSPADM

## 2021-06-30 RX ORDER — MIDAZOLAM HYDROCHLORIDE 1 MG/ML
INJECTION INTRAMUSCULAR; INTRAVENOUS AS NEEDED
Status: DISCONTINUED | OUTPATIENT
Start: 2021-06-30 | End: 2021-06-30 | Stop reason: SURG

## 2021-06-30 RX ORDER — DEXAMETHASONE SODIUM PHOSPHATE 4 MG/ML
INJECTION, SOLUTION INTRA-ARTICULAR; INTRALESIONAL; INTRAMUSCULAR; INTRAVENOUS; SOFT TISSUE AS NEEDED
Status: DISCONTINUED | OUTPATIENT
Start: 2021-06-30 | End: 2021-06-30 | Stop reason: SURG

## 2021-06-30 RX ORDER — FENTANYL CITRATE 50 UG/ML
INJECTION, SOLUTION INTRAMUSCULAR; INTRAVENOUS AS NEEDED
Status: DISCONTINUED | OUTPATIENT
Start: 2021-06-30 | End: 2021-06-30 | Stop reason: SURG

## 2021-06-30 RX ORDER — PROPOFOL 10 MG/ML
VIAL (ML) INTRAVENOUS AS NEEDED
Status: DISCONTINUED | OUTPATIENT
Start: 2021-06-30 | End: 2021-06-30 | Stop reason: SURG

## 2021-06-30 RX ORDER — OXYCODONE HYDROCHLORIDE AND ACETAMINOPHEN 5; 325 MG/1; MG/1
1 TABLET ORAL EVERY 4 HOURS PRN
Status: DISCONTINUED | OUTPATIENT
Start: 2021-06-30 | End: 2021-06-30 | Stop reason: HOSPADM

## 2021-06-30 RX ORDER — HYDROMORPHONE HCL 110MG/55ML
PATIENT CONTROLLED ANALGESIA SYRINGE INTRAVENOUS AS NEEDED
Status: DISCONTINUED | OUTPATIENT
Start: 2021-06-30 | End: 2021-06-30 | Stop reason: SURG

## 2021-06-30 RX ORDER — LABETALOL HYDROCHLORIDE 5 MG/ML
INJECTION, SOLUTION INTRAVENOUS AS NEEDED
Status: DISCONTINUED | OUTPATIENT
Start: 2021-06-30 | End: 2021-06-30 | Stop reason: SURG

## 2021-06-30 RX ORDER — KETOROLAC TROMETHAMINE 30 MG/ML
INJECTION, SOLUTION INTRAMUSCULAR; INTRAVENOUS AS NEEDED
Status: DISCONTINUED | OUTPATIENT
Start: 2021-06-30 | End: 2021-06-30 | Stop reason: SURG

## 2021-06-30 RX ORDER — PHENAZOPYRIDINE HYDROCHLORIDE 100 MG/1
200 TABLET, FILM COATED ORAL ONCE
Status: DISCONTINUED | OUTPATIENT
Start: 2021-06-30 | End: 2021-06-30 | Stop reason: HOSPADM

## 2021-06-30 RX ORDER — OXYCODONE HYDROCHLORIDE 5 MG/1
5 TABLET ORAL
Status: DISCONTINUED | OUTPATIENT
Start: 2021-06-30 | End: 2021-06-30 | Stop reason: HOSPADM

## 2021-06-30 RX ORDER — KETOROLAC TROMETHAMINE 30 MG/ML
30 INJECTION, SOLUTION INTRAMUSCULAR; INTRAVENOUS EVERY 6 HOURS PRN
Status: DISCONTINUED | OUTPATIENT
Start: 2021-06-30 | End: 2021-06-30 | Stop reason: HOSPADM

## 2021-06-30 RX ORDER — SODIUM CHLORIDE, SODIUM LACTATE, POTASSIUM CHLORIDE, CALCIUM CHLORIDE 600; 310; 30; 20 MG/100ML; MG/100ML; MG/100ML; MG/100ML
125 INJECTION, SOLUTION INTRAVENOUS CONTINUOUS
Status: DISCONTINUED | OUTPATIENT
Start: 2021-06-30 | End: 2021-06-30 | Stop reason: HOSPADM

## 2021-06-30 RX ADMIN — SODIUM CHLORIDE, POTASSIUM CHLORIDE, SODIUM LACTATE AND CALCIUM CHLORIDE: 600; 310; 30; 20 INJECTION, SOLUTION INTRAVENOUS at 06:40

## 2021-06-30 RX ADMIN — FENTANYL CITRATE 50 MCG: 50 INJECTION INTRAMUSCULAR; INTRAVENOUS at 05:41

## 2021-06-30 RX ADMIN — HYDROMORPHONE HYDROCHLORIDE 1 MG: 2 INJECTION, SOLUTION INTRAMUSCULAR; INTRAVENOUS; SUBCUTANEOUS at 07:10

## 2021-06-30 RX ADMIN — LIDOCAINE HYDROCHLORIDE 100 MG: 20 INJECTION, SOLUTION INFILTRATION; PERINEURAL at 05:16

## 2021-06-30 RX ADMIN — FENTANYL CITRATE 50 MCG: 50 INJECTION INTRAMUSCULAR; INTRAVENOUS at 05:22

## 2021-06-30 RX ADMIN — ROCURONIUM BROMIDE 20 MG: 10 INJECTION INTRAVENOUS at 06:40

## 2021-06-30 RX ADMIN — LABETALOL 20 MG/4 ML (5 MG/ML) INTRAVENOUS SYRINGE 10 MG: at 05:51

## 2021-06-30 RX ADMIN — KETOROLAC TROMETHAMINE 30 MG: 30 INJECTION, SOLUTION INTRAMUSCULAR; INTRAVENOUS at 00:06

## 2021-06-30 RX ADMIN — ROCURONIUM BROMIDE 10 MG: 10 INJECTION INTRAVENOUS at 05:53

## 2021-06-30 RX ADMIN — HYDRALAZINE HYDROCHLORIDE 5 MG: 20 INJECTION INTRAMUSCULAR; INTRAVENOUS at 06:11

## 2021-06-30 RX ADMIN — SUGAMMADEX 200 MG: 100 INJECTION, SOLUTION INTRAVENOUS at 07:01

## 2021-06-30 RX ADMIN — HYDRALAZINE HYDROCHLORIDE 10 MG: 20 INJECTION INTRAMUSCULAR; INTRAVENOUS at 06:06

## 2021-06-30 RX ADMIN — DEXMEDETOMIDINE HYDROCHLORIDE 50 MCG: 100 INJECTION, SOLUTION INTRAVENOUS at 05:43

## 2021-06-30 RX ADMIN — SODIUM CHLORIDE, POTASSIUM CHLORIDE, SODIUM LACTATE AND CALCIUM CHLORIDE: 600; 310; 30; 20 INJECTION, SOLUTION INTRAVENOUS at 05:17

## 2021-06-30 RX ADMIN — ROCURONIUM BROMIDE 50 MG: 10 INJECTION INTRAVENOUS at 05:16

## 2021-06-30 RX ADMIN — PROPOFOL 200 MG: 10 INJECTION, EMULSION INTRAVENOUS at 05:16

## 2021-06-30 RX ADMIN — OXYCODONE HYDROCHLORIDE AND ACETAMINOPHEN 1 TABLET: 5; 325 TABLET ORAL at 11:34

## 2021-06-30 RX ADMIN — ROCURONIUM BROMIDE 10 MG: 10 INJECTION INTRAVENOUS at 05:23

## 2021-06-30 RX ADMIN — KETOROLAC TROMETHAMINE 30 MG: 30 INJECTION, SOLUTION INTRAMUSCULAR; INTRAVENOUS at 07:01

## 2021-06-30 RX ADMIN — MIDAZOLAM HYDROCHLORIDE 2 MG: 1 INJECTION, SOLUTION INTRAMUSCULAR; INTRAVENOUS at 04:46

## 2021-06-30 RX ADMIN — DEXAMETHASONE SODIUM PHOSPHATE 4 MG: 4 INJECTION INTRA-ARTICULAR; INTRALESIONAL; INTRAMUSCULAR; INTRAVENOUS; SOFT TISSUE at 05:22

## 2021-06-30 RX ADMIN — FENTANYL CITRATE 100 MCG: 50 INJECTION INTRAMUSCULAR; INTRAVENOUS at 05:13

## 2021-06-30 RX ADMIN — HYDROMORPHONE HYDROCHLORIDE 1 MG: 2 INJECTION, SOLUTION INTRAMUSCULAR; INTRAVENOUS; SUBCUTANEOUS at 07:03

## 2021-06-30 RX ADMIN — ONDANSETRON 4 MG: 2 INJECTION INTRAMUSCULAR; INTRAVENOUS at 07:01

## 2021-06-30 RX ADMIN — LABETALOL 20 MG/4 ML (5 MG/ML) INTRAVENOUS SYRINGE 10 MG: at 05:56

## 2021-06-30 RX ADMIN — SODIUM CHLORIDE 1000 ML: 9 INJECTION, SOLUTION INTRAVENOUS at 00:06

## 2021-06-30 RX ADMIN — CEFAZOLIN SODIUM 2 G: 2 INJECTION, SOLUTION INTRAVENOUS at 05:09

## 2021-06-30 NOTE — H&P
[facility]   HISTORY AND PHYSICAL    Patient Name: Bhargavi Jeronimo  : 1985  MRN: [id number]  Primary Care Physician:  Provider, No Known  Date of admission: (Not on file)    Subjective   Subjective     Chief Complaint: RLQ pain    HPI:    Bhargavi Jeronimo is a 35 y.o. female GIRMA with bilateral salpingectomy on 21    Review of Systems   Severe RLQ pain starting this afternoon    Personal History     Past Medical History:   Diagnosis Date   • Anxiety    • Depression    • Intermediate uveitis    • Migraine        Past Surgical History:   Procedure Laterality Date   • APPENDECTOMY     • CHOLECYSTECTOMY     • CYSTOSCOPY N/A 2021    Procedure: CYSTOSCOPY;  Surgeon: Cliff Pillai MD;  Location: [facility] MAIN OR;  Service: Obstetrics/Gynecology;  Laterality: N/A;   • DIAGNOSTIC LAPAROSCOPY TOTAL ABDOMINAL HYSTERECTOMY N/A 2021    Procedure: DIAGNOSTIC LAPAROSCOPY TOTAL ABDOMINAL HYSTERECTOMY, BILATERAL SALPINGECTOMY;  Surgeon: Cliff Pillai MD;  Location: [facility] OR;  Service: Obstetrics/Gynecology;  Laterality: N/A;   • ORIF ANKLE FRACTURE Left        Family History: family history includes Cancer in her maternal aunt, maternal grandmother, and paternal grandfather; Diabetes in her paternal grandfather. Otherwise pertinent FHx was reviewed and not pertinent to current issue.    Social History:  reports that she has been smoking. She has been smoking about 0.50 packs per day. She has never used smokeless tobacco. She reports previous alcohol use. She reports that she does not use drugs.    Home Medications:  Adalimumab, bisacodyl, brimonidine, buPROPion, busPIRone, cetirizine, ibuprofen, lamoTRIgine, multivitamin with minerals, prednisoLONE acetate, simethicone, and traZODone      Allergies:  Allergies   Allergen Reactions   • Latex Hives     Tree nuts and peanuts   • Nuts Anaphylaxis       Objective   Objective     Vitals:   Temp:  [98.9 °F (37.2 °C)] 98.9 °F  (37.2 °C)  Heart Rate:  [63-74] 63  Resp:  [17] 17  BP: ()/(37-92) 146/92  Physical Exam    Constitutional: Awake, alert   HENT: NCAT, mucous membranes moist   Neck: Supple, no thyromegaly, no lymphadenopathy, trachea midline   Respiratory: Clear to auscultation bilaterally, nonlabored respirations    Cardiovascular: RRR, no murmurs.              Abdomen: RLQ tenderness with rebound.   Musculoskeletal: No bilateral ankle edema, no clubbing or cyanosis to extremities   Psychiatric: Appropriate affect, cooperative   Skin: No rashes     Result Review    Result Review:  I have personally reviewed the results from the time of this admission to 6/30/2021 04:04 EDT and agree with these findings:  []  Laboratory  []  Microbiology  [x]  Radiology  []  EKG/Telemetry   []  Cardiology/Vascular   []  Pathology  []  Old records  []  Other:  Most notable findings include: Findings consistent with Right ovarian torsion    Assessment/Plan   Assessment / Plan     Brief Patient Summary:  Bhargavi Jeronimo is a 35 y.o. female who has a right ovarian torsion.    Active Hospital Problems:  There are no active hospital problems to display for this patient.      Plan:   Laparoscopic right oopherectomy    SURGICAL RISKS AND BENEFITS DISCUSSED.    DVT prophylaxis:  No DVT prophylaxis order currently exists.        Electronically signed by Cliff Pillai MD, 06/30/21, 4:04 AM EDT.

## 2021-06-30 NOTE — ANESTHESIA POSTPROCEDURE EVALUATION
Patient: Bhargavi Jeronimo    Procedure Summary     Date: 06/30/21 Room / Location: [facility] OR  / [facility] MAIN OR    Anesthesia Start: 0504 Anesthesia Stop: 0734    Procedures:       OOPHORECTOMY (Right Abdomen)      LAPAROSCOPIC LYSIS OF ADHESIONS (N/A Abdomen) Diagnosis:       Chronic pelvic pain in female      Fibroids, intramural      (Chronic pelvic pain in female [R10.2, G89.29])      (Fibroids, intramural [D25.1])    Surgeons: Cliff Pillai MD Provider: Rolando Mcneal MD    Anesthesia Type: general ASA Status: 2          Anesthesia Type: general    Vitals  Vitals Value Taken Time   /59 06/30/21 0815   Temp 36.1 °C (96.9 °F) 06/30/21 0735   Pulse 82 06/30/21 0816   Resp 12 06/30/21 0805   SpO2 95 % 06/30/21 0816   Vitals shown include unvalidated device data.        Post Anesthesia Care and Evaluation    Patient location during evaluation: bedside  Patient participation: complete - patient participated  Level of consciousness: awake  Pain score: 0  Pain management: adequate  Airway patency: patent  Anesthetic complications: No anesthetic complications  PONV Status: none  Cardiovascular status: acceptable and stable  Respiratory status: acceptable and room air  Hydration status: acceptable

## 2021-06-30 NOTE — ANESTHESIA PREPROCEDURE EVALUATION
Anesthesia Evaluation     Patient summary reviewed and Nursing notes reviewed   no history of anesthetic complications:  NPO Solid Status: > 8 hours  NPO Liquid Status: > 2 hours           Airway   Mallampati: II  TM distance: >3 FB  Neck ROM: full  No difficulty expected  Dental      Pulmonary - negative pulmonary ROS and normal exam    breath sounds clear to auscultation  Cardiovascular - negative cardio ROS and normal exam  Exercise tolerance: good (4-7 METS)    Rhythm: regular        Neuro/Psych  (+) headaches, psychiatric history Anxiety and Bipolar,     GI/Hepatic/Renal/Endo - negative ROS     Musculoskeletal (-) negative ROS    Abdominal    Substance History - negative use     OB/GYN negative ob/gyn ROS         Other - negative ROS                     Anesthesia Plan    ASA 2     general   (Patient understands anesthesia not responsible for dental damage.)  intravenous induction     Anesthetic plan, all risks, benefits, and alternatives have been provided, discussed and informed consent has been obtained with: patient.  Use of blood products discussed with patient .   Plan discussed with CRNA.

## 2021-07-19 ENCOUNTER — LAB REQUISITION (OUTPATIENT)
Dept: LAB | Facility: HOSPITAL | Age: 36
End: 2021-07-19

## 2021-07-19 DIAGNOSIS — R30.0 DYSURIA: ICD-10-CM

## 2021-07-19 PROCEDURE — 87086 URINE CULTURE/COLONY COUNT: CPT | Performed by: OBSTETRICS & GYNECOLOGY

## 2021-07-20 LAB — BACTERIA SPEC AEROBE CULT: NO GROWTH

## 2021-09-03 ENCOUNTER — DOCUMENTATION (OUTPATIENT)
Dept: OBSTETRICS AND GYNECOLOGY | Facility: HOSPITAL | Age: 36
End: 2021-09-03

## 2021-09-03 NOTE — OP NOTE
This is an addendum to my operative report of 6/30/2021.  I did not dictate the body of the operative report at that time.  He was not prompted in the template.  I am dictating this on 9/3/1921 at approximately 6:30 PM with the patient's chart in front of me and the pictures of her surgery.    Bhargavi has had a laparoscopic hysterectomy converted to an abdominal hysterectomy on June 8.  She desired ovarian conservation at that time which was honored.  Patient presented to the emergency room having severe right lower quadrant pain after seeing me in the office.  She did follow my recommendation I was contacted by the emergency room doctor on call and recommended evaluation for torsion which was then discovered.    Cliff was taken to the operating room where she underwent excellent general anesthetic administration she was then prepped and draped in the usual manner no instrumentation was placed in the vagina as she had recently had a hysterectomy.  A 5 mm vertical infraumbilical incision was made with a knife and the varies needle was inserted on the first attempt.  Pneumoperitoneum was up obtained and a bladeless 5 mm trocar was placed.  There is a large amount of adhesions some blood in the pelvis at this point a left-sided 5 mm trocar was placed under direct visualization as was a larger midline 11 mm trocar.  Multiple adhesions need to be taken down to get to the area of the ovary due to recent surgery.  A large torsed ovary was then identified.  With a bit of technical difficulty and more adhesiolysis the right utero-ovarian ligament was taken down and the ovary removed from the peritoneal edge visualizing the ureter during the entire process.  The ovary was then removed from the suprapubic site.  The pelvis was irrigated there was found to be a very large blood clot which was then suctioned and evacuated.  The entire area remained visualized for quite some time with no further bleeding whatsoever.  The pelvis  was copiously irrigated.  The suprapubic trocar site was closed using the Nemesio-Dre device with 0 Vicryl tie the pneumoperitoneum was relieved and the skin was closed with interrupted 4-0 Monocryl sutures as well as Steri-Strips.  All counts were correct the case was terminated and Bhargavi went to recovery room in stable condition.

## 2021-11-17 ENCOUNTER — LAB (OUTPATIENT)
Dept: LAB | Facility: HOSPITAL | Age: 36
End: 2021-11-17

## 2021-11-17 ENCOUNTER — OFFICE VISIT (OUTPATIENT)
Dept: FAMILY MEDICINE CLINIC | Facility: CLINIC | Age: 36
End: 2021-11-17

## 2021-11-17 VITALS
TEMPERATURE: 97.8 F | BODY MASS INDEX: 26.66 KG/M2 | WEIGHT: 160 LBS | HEART RATE: 73 BPM | SYSTOLIC BLOOD PRESSURE: 134 MMHG | OXYGEN SATURATION: 100 % | HEIGHT: 65 IN | RESPIRATION RATE: 20 BRPM | DIASTOLIC BLOOD PRESSURE: 64 MMHG

## 2021-11-17 DIAGNOSIS — F32.A DEPRESSION, UNSPECIFIED DEPRESSION TYPE: ICD-10-CM

## 2021-11-17 DIAGNOSIS — F41.9 ANXIETY: ICD-10-CM

## 2021-11-17 DIAGNOSIS — F31.9 BIPOLAR AFFECTIVE DISORDER, REMISSION STATUS UNSPECIFIED (HCC): ICD-10-CM

## 2021-11-17 DIAGNOSIS — Z13.29 SCREENING FOR THYROID DISORDER: ICD-10-CM

## 2021-11-17 DIAGNOSIS — Z13.220 SCREENING FOR LIPID DISORDERS: ICD-10-CM

## 2021-11-17 DIAGNOSIS — Z76.89 ESTABLISHING CARE WITH NEW DOCTOR, ENCOUNTER FOR: Primary | ICD-10-CM

## 2021-11-17 PROBLEM — F10.21 ALCOHOLISM IN REMISSION (HCC): Status: ACTIVE | Noted: 2021-11-17

## 2021-11-17 PROBLEM — J45.909 ASTHMA: Status: ACTIVE | Noted: 2021-11-17

## 2021-11-17 PROBLEM — H20.9 UVEITIS: Status: ACTIVE | Noted: 2021-11-17

## 2021-11-17 LAB
ALBUMIN SERPL-MCNC: 4.8 G/DL (ref 3.5–5.2)
ALBUMIN/GLOB SERPL: 1.4 G/DL
ALP SERPL-CCNC: 89 U/L (ref 39–117)
ALT SERPL W P-5'-P-CCNC: 15 U/L (ref 1–33)
ANION GAP SERPL CALCULATED.3IONS-SCNC: 9.6 MMOL/L (ref 5–15)
AST SERPL-CCNC: 25 U/L (ref 1–32)
BILIRUB SERPL-MCNC: 0.5 MG/DL (ref 0–1.2)
BUN SERPL-MCNC: 7 MG/DL (ref 6–20)
BUN/CREAT SERPL: 9.6 (ref 7–25)
CALCIUM SPEC-SCNC: 9.5 MG/DL (ref 8.6–10.5)
CHLORIDE SERPL-SCNC: 103 MMOL/L (ref 98–107)
CHOLEST SERPL-MCNC: 164 MG/DL (ref 0–200)
CO2 SERPL-SCNC: 25.4 MMOL/L (ref 22–29)
CREAT SERPL-MCNC: 0.73 MG/DL (ref 0.57–1)
DEPRECATED RDW RBC AUTO: 50 FL (ref 37–54)
EOSINOPHIL # BLD MANUAL: 0.11 10*3/MM3 (ref 0–0.4)
EOSINOPHIL NFR BLD MANUAL: 1 % (ref 0.3–6.2)
ERYTHROCYTE [DISTWIDTH] IN BLOOD BY AUTOMATED COUNT: 16.7 % (ref 12.3–15.4)
GFR SERPL CREATININE-BSD FRML MDRD: 108 ML/MIN/1.73
GLOBULIN UR ELPH-MCNC: 3.4 GM/DL
GLUCOSE SERPL-MCNC: 75 MG/DL (ref 65–99)
HCT VFR BLD AUTO: 37 % (ref 34–46.6)
HDLC SERPL-MCNC: 65 MG/DL (ref 40–60)
HGB BLD-MCNC: 12.4 G/DL (ref 12–15.9)
LDLC SERPL CALC-MCNC: 90 MG/DL (ref 0–100)
LDLC/HDLC SERPL: 1.4 {RATIO}
LYMPHOCYTES # BLD MANUAL: 4.22 10*3/MM3 (ref 0.7–3.1)
LYMPHOCYTES NFR BLD MANUAL: 5.2 % (ref 5–12)
MCH RBC QN AUTO: 27.8 PG (ref 26.6–33)
MCHC RBC AUTO-ENTMCNC: 33.5 G/DL (ref 31.5–35.7)
MCV RBC AUTO: 83 FL (ref 79–97)
MONOCYTES # BLD: 0.59 10*3/MM3 (ref 0.1–0.9)
NEUTROPHILS # BLD AUTO: 6.45 10*3/MM3 (ref 1.7–7)
NEUTROPHILS NFR BLD MANUAL: 56.7 % (ref 42.7–76)
PLAT MORPH BLD: NORMAL
PLATELET # BLD AUTO: 339 10*3/MM3 (ref 140–450)
PMV BLD AUTO: 10.2 FL (ref 6–12)
POLYCHROMASIA BLD QL SMEAR: ABNORMAL
POTASSIUM SERPL-SCNC: 4.2 MMOL/L (ref 3.5–5.2)
PROT SERPL-MCNC: 8.2 G/DL (ref 6–8.5)
RBC # BLD AUTO: 4.46 10*6/MM3 (ref 3.77–5.28)
SODIUM SERPL-SCNC: 138 MMOL/L (ref 136–145)
TRIGL SERPL-MCNC: 41 MG/DL (ref 0–150)
TSH SERPL DL<=0.05 MIU/L-ACNC: 0.84 UIU/ML (ref 0.27–4.2)
VARIANT LYMPHS NFR BLD MANUAL: 37.1 % (ref 19.6–45.3)
VLDLC SERPL-MCNC: 9 MG/DL (ref 5–40)
WBC MORPH BLD: NORMAL
WBC NRBC COR # BLD: 11.38 10*3/MM3 (ref 3.4–10.8)

## 2021-11-17 PROCEDURE — 99204 OFFICE O/P NEW MOD 45 MIN: CPT | Performed by: NURSE PRACTITIONER

## 2021-11-17 PROCEDURE — 80061 LIPID PANEL: CPT

## 2021-11-17 PROCEDURE — 85025 COMPLETE CBC W/AUTO DIFF WBC: CPT

## 2021-11-17 PROCEDURE — 84443 ASSAY THYROID STIM HORMONE: CPT

## 2021-11-17 PROCEDURE — 80053 COMPREHEN METABOLIC PANEL: CPT

## 2021-11-17 PROCEDURE — 85007 BL SMEAR W/DIFF WBC COUNT: CPT

## 2021-11-17 PROCEDURE — 36415 COLL VENOUS BLD VENIPUNCTURE: CPT

## 2021-11-17 RX ORDER — FLUTICASONE PROPIONATE 50 MCG
2 SPRAY, SUSPENSION (ML) NASAL DAILY
COMMUNITY

## 2021-11-17 NOTE — PROGRESS NOTES
Chief Complaint   Patient presents with   • Establish Care     No concerns       Subjective          Bhargavi Jeronimo presents to [facility]    New patient/est care:    No previous provider. Medications have been prescribed by psychiatric provider.     Teacher, works at [employer], lives in [place].    Pt with bipolar d/o, anxiety/depression, allergic rhinitis, uveitis. No acute issues or complaints. Reviewed all recent hospital notes, labs.       Past History:  Medical History: has a past medical history of Allergic, Anemia, Anxiety, Arthritis, Asthma, Cataract, Depression, Intermediate uveitis, Migraine, and sinus trouble.   Surgical History: has a past surgical history that includes ORIF ankle fracture (Left); Cholecystectomy (2006); Appendectomy; Cystoscopy (N/A, 6/8/2021); diagnostic laparoscopy total abdominal hysterectomy (N/A, 6/8/2021); Oophorectomy (Right, 6/30/2021); and laparoscopic lysis of adhesions (N/A, 6/30/2021).   Family History: family history includes Cancer in her maternal aunt, maternal grandmother, and paternal grandfather; Diabetes in her paternal grandfather; Fibroids in her mother; Other in her mother.   Social History: reports that she has been smoking. She has been smoking about 0.50 packs per day. She has never used smokeless tobacco. She reports previous alcohol use. She reports that she does not use drugs.  Allergies: Latex and Nuts  (Not in a hospital admission)       Social History     Socioeconomic History   • Marital status: Single   Tobacco Use   • Smoking status: Current Every Day Smoker     Packs/day: 0.50   • Smokeless tobacco: Never Used   Vaping Use   • Vaping Use: Never used   Substance and Sexual Activity   • Alcohol use: Not Currently   • Drug use: Never   • Sexual activity: Defer       Health Maintenance Due   Topic Date Due   • ANNUAL PHYSICAL  Never done   • HEPATITIS C SCREENING  Never done       Objective     Vital Signs:  "  /64   Pulse 73   Temp 97.8 °F (36.6 °C)   Resp 20   Ht 165.1 cm (65\")   Wt 72.6 kg (160 lb)   SpO2 100%   BMI 26.63 kg/m²       Physical Exam  Vitals reviewed.   Constitutional:       General: She is not in acute distress.     Appearance: Normal appearance.   HENT:      Head: Normocephalic.      Right Ear: Tympanic membrane normal.      Left Ear: Tympanic membrane normal.      Nose: Nose normal.      Mouth/Throat:      Pharynx: Oropharynx is clear. No posterior oropharyngeal erythema.   Eyes:      General: No scleral icterus.     Extraocular Movements: Extraocular movements intact.      Conjunctiva/sclera: Conjunctivae normal.      Pupils: Pupils are equal, round, and reactive to light.   Cardiovascular:      Rate and Rhythm: Normal rate and regular rhythm.      Pulses: Normal pulses.      Heart sounds: Normal heart sounds.   Pulmonary:      Effort: Pulmonary effort is normal.      Breath sounds: Normal breath sounds.   Abdominal:      General: Bowel sounds are normal.      Palpations: Abdomen is soft.   Musculoskeletal:         General: Normal range of motion.      Cervical back: Neck supple.   Skin:     General: Skin is warm and dry.   Neurological:      Mental Status: She is alert and oriented to person, place, and time.   Psychiatric:         Mood and Affect: Mood normal.         Behavior: Behavior normal.         Thought Content: Thought content normal.         Judgment: Judgment normal.          Review of Systems   Constitutional: Negative for chills, fatigue and fever.   HENT: Negative for congestion, ear pain, sinus pressure and sore throat.    Eyes: Negative for blurred vision.   Respiratory: Negative for cough and shortness of breath.    Cardiovascular: Negative for chest pain, palpitations and leg swelling.   Gastrointestinal: Negative for abdominal pain, constipation, diarrhea, nausea, vomiting and GERD.   Musculoskeletal: Negative for arthralgias.   Skin: Negative for rash and skin " lesions.   Neurological: Negative for dizziness and headache.   Psychiatric/Behavioral: Negative for sleep disturbance, suicidal ideas and depressed mood. The patient is not nervous/anxious.         Result Review :     Common labs    Common Labsle 6/8/21 6/8/21 6/8/21 6/9/21 6/29/21 6/29/21    0714 1009 1408  1916 1916   Glucose      89   BUN      4 (A)   Creatinine      0.69   eGFR African Am      116   Sodium      139   Potassium      4.1   Chloride      97 (A)   Calcium      9.9   Albumin      4.90   Total Bilirubin      0.3   Alkaline Phosphatase      107   AST (SGOT)      21   ALT (SGPT)      14   WBC 9.92   18.81 (A) 12.16 (A)    Hemoglobin 12.9 10.4 (A) 12.0 10.0 (A) 11.6 (A)    Hematocrit 38.7 31.7 (A) 37.4 29.4 (A) 35.9    Platelets 272   249 574 (A)    (A) Abnormal value            Data reviewed: Consultant notes GYN and Recent hospitalization notes July 2021          Assessment and Plan    Diagnoses and all orders for this visit:    1. Establishing care with new doctor, encounter for (Primary)    2. Depression, unspecified depression type  Assessment & Plan:  Patient's depression is recurrent and is mild without psychosis. Their depression is currently active and the condition is newly identified. This will be reassessed at the next regular appointment. F/U as described:patient will continue current medication therapy.    Orders:  -     Comprehensive Metabolic Panel; Future  -     CBC & Differential; Future    3. Anxiety    4. Screening for thyroid disorder  -     TSH; Future    5. Screening for lipid disorders  -     Lipid Panel; Future    6. Bipolar affective disorder, remission status unspecified (HCC)  Assessment & Plan:  Psychological condition is improving with treatment.  Continue current treatment regimen.  Regular aerobic exercise.  Psychological condition  will be reassessed at the next regular appointment.          Follow Up   No follow-ups on file.  Patient was given instructions and  counseling regarding her condition or for health maintenance advice. Please see specific information pulled into the AVS if appropriate.

## 2021-11-17 NOTE — ASSESSMENT & PLAN NOTE
Patient's depression is recurrent and is mild without psychosis. Their depression is currently active and the condition is newly identified. This will be reassessed at the next regular appointment. F/U as described:patient will continue current medication therapy.

## 2021-11-30 ENCOUNTER — TELEPHONE (OUTPATIENT)
Dept: URGENT CARE | Facility: CLINIC | Age: 36
End: 2021-11-30

## 2021-11-30 PROCEDURE — 80053 COMPREHEN METABOLIC PANEL: CPT | Performed by: NURSE PRACTITIONER

## 2021-11-30 PROCEDURE — 85025 COMPLETE CBC W/AUTO DIFF WBC: CPT | Performed by: NURSE PRACTITIONER

## 2021-12-03 ENCOUNTER — OFFICE VISIT (OUTPATIENT)
Dept: FAMILY MEDICINE CLINIC | Facility: CLINIC | Age: 36
End: 2021-12-03

## 2021-12-03 ENCOUNTER — HOSPITAL ENCOUNTER (OUTPATIENT)
Dept: GENERAL RADIOLOGY | Facility: HOSPITAL | Age: 36
Discharge: HOME | End: 2021-12-03
Admitting: NURSE PRACTITIONER

## 2021-12-03 VITALS
OXYGEN SATURATION: 100 % | HEART RATE: 79 BPM | HEIGHT: 65 IN | BODY MASS INDEX: 27.14 KG/M2 | WEIGHT: 162.9 LBS | DIASTOLIC BLOOD PRESSURE: 83 MMHG | SYSTOLIC BLOOD PRESSURE: 130 MMHG | RESPIRATION RATE: 14 BRPM

## 2021-12-03 DIAGNOSIS — R04.0 EPISTAXIS: Primary | ICD-10-CM

## 2021-12-03 DIAGNOSIS — R04.0 EPISTAXIS: ICD-10-CM

## 2021-12-03 DIAGNOSIS — J30.9 ALLERGIC RHINITIS, UNSPECIFIED SEASONALITY, UNSPECIFIED TRIGGER: ICD-10-CM

## 2021-12-03 DIAGNOSIS — J01.90 ACUTE NON-RECURRENT SINUSITIS, UNSPECIFIED LOCATION: ICD-10-CM

## 2021-12-03 DIAGNOSIS — I10 ESSENTIAL HYPERTENSION: ICD-10-CM

## 2021-12-03 PROCEDURE — 99214 OFFICE O/P EST MOD 30 MIN: CPT | Performed by: NURSE PRACTITIONER

## 2021-12-03 PROCEDURE — 70210 X-RAY EXAM OF SINUSES: CPT

## 2021-12-03 RX ORDER — AMLODIPINE BESYLATE 2.5 MG/1
2.5 TABLET ORAL DAILY
Qty: 30 TABLET | Refills: 5 | Status: SHIPPED | OUTPATIENT
Start: 2021-12-03 | End: 2022-07-27

## 2021-12-03 NOTE — ASSESSMENT & PLAN NOTE
Hypertension is newly identified.  Medication changes per orders.  Ambulatory blood pressure monitoring.  Blood pressure will be reassessed in 4 weeks.

## 2021-12-03 NOTE — PROGRESS NOTES
Chief Complaint   Patient presents with   • Headache   • Dizziness   • Sinusitis       Subjective          Bhargavi Jeronimo presents to [facility]    Pt presents c/o epistaxis for 4 hrs on 11/30/21. Reports no injury or falls. Pt dx with sinusitis and given cefdinir on 11/30/21. Epistaxis has resolved but patient continues to have dizziness and HA. Todays /83. 143/94, 153/89 previously. Denies N/V, SOB, loss of taste or smell, sore throat, or other.        Past History:  Medical History: has a past medical history of Allergic, Anemia, Anxiety, Arthritis, Asthma, Cataract, Depression, Intermediate uveitis, Migraine, and sinus trouble.   Surgical History: has a past surgical history that includes ORIF ankle fracture (Left); Cholecystectomy (2006); Cystoscopy (N/A, 6/8/2021); diagnostic laparoscopy total abdominal hysterectomy (N/A, 6/8/2021); Oophorectomy (Right, 6/30/2021); and laparoscopic lysis of adhesions (N/A, 6/30/2021).   Family History: family history includes Cancer in her maternal aunt, maternal grandmother, and paternal grandfather; Diabetes in her paternal grandfather; Fibroids in her mother; Other in her mother.   Social History: reports that she has been smoking. She has a 10.00 pack-year smoking history. She has never used smokeless tobacco. She reports previous alcohol use. She reports that she does not use drugs.  Allergies: Latex and Nuts  (Not in a hospital admission)       Social History     Socioeconomic History   • Marital status: Single   Tobacco Use   • Smoking status: Current Every Day Smoker     Packs/day: 0.50     Years: 20.00     Pack years: 10.00   • Smokeless tobacco: Never Used   Vaping Use   • Vaping Use: Never used   Substance and Sexual Activity   • Alcohol use: Not Currently     Comment: SOCIAL   • Drug use: Never   • Sexual activity: Defer       Health Maintenance Due   Topic Date Due   • ANNUAL PHYSICAL  Never done   • HEPATITIS C  "SCREENING  Never done       Objective     Vital Signs:   /83   Pulse 79   Resp 14   Ht 165.1 cm (65\")   Wt 73.9 kg (162 lb 14.4 oz)   SpO2 100%   BMI 27.11 kg/m²       Physical Exam  Vitals reviewed.   Constitutional:       General: She is not in acute distress.     Appearance: Normal appearance.   HENT:      Head: Normocephalic.      Right Ear: Tympanic membrane normal.      Left Ear: Tympanic membrane normal.      Nose: Congestion present. No nasal deformity, signs of injury or nasal tenderness.      Right Turbinates: Not enlarged or swollen.      Left Turbinates: Not enlarged or swollen.      Mouth/Throat:      Pharynx: Oropharynx is clear. No posterior oropharyngeal erythema.   Eyes:      General: No scleral icterus.     Extraocular Movements: Extraocular movements intact.      Conjunctiva/sclera: Conjunctivae normal.      Pupils: Pupils are equal, round, and reactive to light.   Cardiovascular:      Rate and Rhythm: Normal rate and regular rhythm.      Pulses: Normal pulses.      Heart sounds: Normal heart sounds.   Pulmonary:      Effort: Pulmonary effort is normal.      Breath sounds: Normal breath sounds.   Abdominal:      General: Bowel sounds are normal.      Palpations: Abdomen is soft.   Musculoskeletal:         General: Normal range of motion.      Cervical back: Neck supple.   Skin:     General: Skin is warm and dry.   Neurological:      Mental Status: She is alert and oriented to person, place, and time.   Psychiatric:         Mood and Affect: Mood normal.         Behavior: Behavior normal.         Thought Content: Thought content normal.         Judgment: Judgment normal.          Review of Systems   Constitutional: Negative for chills, fatigue and fever.   HENT: Positive for congestion. Negative for ear pain, sinus pressure and sore throat.    Eyes: Negative for blurred vision.   Respiratory: Negative for cough and shortness of breath.    Cardiovascular: Negative for chest pain, " palpitations and leg swelling.   Gastrointestinal: Negative for abdominal pain, constipation, diarrhea, nausea, vomiting and GERD.   Musculoskeletal: Negative for arthralgias.   Skin: Negative for rash and skin lesions.   Neurological: Positive for dizziness and headache.   Psychiatric/Behavioral: Negative for sleep disturbance, suicidal ideas and depressed mood. The patient is not nervous/anxious.         Result Review :     Common labs    Common Labsle 6/29/21 6/29/21 11/17/21 11/17/21 11/17/21 11/30/21 11/30/21    1916 1916 1142 1142 1142 1644 1644   Glucose  89  75   106 (A)   BUN  4 (A)  7   12   Creatinine  0.69  0.73   0.66   eGFR  Am  116  108   121   Sodium  139  138   132 (A)   Potassium  4.1  4.2   4.6   Chloride  97 (A)  103   98   Calcium  9.9  9.5   9.8   Albumin  4.90  4.80   4.80   Total Bilirubin  0.3  0.5   0.2   Alkaline Phosphatase  107  89   90   AST (SGOT)  21  25   22   ALT (SGPT)  14  15   18   WBC 12.16 (A)  11.38 (A)   12.78 (A)    Hemoglobin 11.6 (A)  12.4   13.5    Hematocrit 35.9  37.0   40.4    Platelets 574 (A)  339   327    Total Cholesterol     164     Triglycerides     41     HDL Cholesterol     65 (A)     LDL Cholesterol      90     (A) Abnormal value                      Assessment and Plan    Diagnoses and all orders for this visit:    1. Epistaxis (Primary)  -     XR Sinus PA & Lateral; Future    2. Allergic rhinitis, unspecified seasonality, unspecified trigger  -     XR Sinus PA & Lateral; Future    3. Acute non-recurrent sinusitis, unspecified location  -     XR Sinus PA & Lateral; Future    4. Essential hypertension  Assessment & Plan:  Hypertension is newly identified.  Medication changes per orders.  Ambulatory blood pressure monitoring.  Blood pressure will be reassessed in 4 weeks.      Other orders  -     amLODIPine (NORVASC) 2.5 MG tablet; Take 1 tablet by mouth Daily.  Dispense: 30 tablet; Refill: 5        Follow Up   Return in 4 weeks (on 12/31/2021), or if  symptoms worsen or fail to improve.  Patient was given instructions and counseling regarding her condition or for health maintenance advice. Please see specific information pulled into the AVS if appropriate.

## 2022-01-03 ENCOUNTER — OFFICE VISIT (OUTPATIENT)
Dept: FAMILY MEDICINE CLINIC | Facility: CLINIC | Age: 37
End: 2022-01-03

## 2022-01-03 VITALS
OXYGEN SATURATION: 100 % | DIASTOLIC BLOOD PRESSURE: 65 MMHG | HEART RATE: 71 BPM | WEIGHT: 163 LBS | SYSTOLIC BLOOD PRESSURE: 136 MMHG | TEMPERATURE: 98 F | RESPIRATION RATE: 14 BRPM | HEIGHT: 65 IN | BODY MASS INDEX: 27.16 KG/M2

## 2022-01-03 DIAGNOSIS — J30.9 ALLERGIC RHINITIS, UNSPECIFIED SEASONALITY, UNSPECIFIED TRIGGER: ICD-10-CM

## 2022-01-03 DIAGNOSIS — R51.9 NONINTRACTABLE HEADACHE, UNSPECIFIED CHRONICITY PATTERN, UNSPECIFIED HEADACHE TYPE: ICD-10-CM

## 2022-01-03 DIAGNOSIS — R42 DIZZINESS: ICD-10-CM

## 2022-01-03 DIAGNOSIS — I10 ESSENTIAL HYPERTENSION: Primary | ICD-10-CM

## 2022-01-03 PROCEDURE — 99214 OFFICE O/P EST MOD 30 MIN: CPT | Performed by: NURSE PRACTITIONER

## 2022-01-03 RX ORDER — MECLIZINE HYDROCHLORIDE 25 MG/1
25 TABLET ORAL 3 TIMES DAILY PRN
Qty: 30 TABLET | Refills: 5 | Status: SHIPPED | OUTPATIENT
Start: 2022-01-03 | End: 2023-03-20

## 2022-01-03 NOTE — PROGRESS NOTES
"Chief Complaint  Hypertension    Subjective          Bhargavi Jeronimo presents to [facility]  Pt presents fu BP, HA, dizziness. Started on amlodipine 2.5mg PO qd at last visit. Today's /65. Pt continues to c/o frequent HA 1-2 times per week and dizziness. Nothing makes worse or better. Denies any falls. Takes Tyl/Motrin to treat.       Objective   Vital Signs:   /65   Pulse 71   Temp 98 °F (36.7 °C)   Resp 14   Ht 165.1 cm (65\")   Wt 73.9 kg (163 lb)   SpO2 100%   BMI 27.12 kg/m²     Physical Exam  Vitals reviewed.   Constitutional:       General: She is not in acute distress.     Appearance: Normal appearance.   HENT:      Head: Normocephalic.      Right Ear: Tympanic membrane normal.      Left Ear: Tympanic membrane normal.      Nose: Nose normal.      Mouth/Throat:      Pharynx: Oropharynx is clear. No posterior oropharyngeal erythema.   Eyes:      General: No scleral icterus.     Extraocular Movements: Extraocular movements intact.      Conjunctiva/sclera: Conjunctivae normal.      Pupils: Pupils are equal, round, and reactive to light.   Cardiovascular:      Rate and Rhythm: Normal rate and regular rhythm.      Pulses: Normal pulses.      Heart sounds: Normal heart sounds.   Pulmonary:      Effort: Pulmonary effort is normal.      Breath sounds: Normal breath sounds.   Abdominal:      General: Bowel sounds are normal.      Palpations: Abdomen is soft.   Musculoskeletal:         General: Normal range of motion.      Cervical back: Neck supple.   Skin:     General: Skin is warm and dry.   Neurological:      Mental Status: She is alert and oriented to person, place, and time.   Psychiatric:         Mood and Affect: Mood normal.         Behavior: Behavior normal.         Thought Content: Thought content normal.         Judgment: Judgment normal.        Result Review :     Common labs    Common Labsle 6/29/21 6/29/21 11/17/21 11/17/21 11/17/21 11/30/21 11/30/21 "    1916 1916 1142 1142 1142 1644 1644   Glucose  89  75   106 (A)   BUN  4 (A)  7   12   Creatinine  0.69  0.73   0.66   eGFR  Am  116  108   121   Sodium  139  138   132 (A)   Potassium  4.1  4.2   4.6   Chloride  97 (A)  103   98   Calcium  9.9  9.5   9.8   Albumin  4.90  4.80   4.80   Total Bilirubin  0.3  0.5   0.2   Alkaline Phosphatase  107  89   90   AST (SGOT)  21  25   22   ALT (SGPT)  14  15   18   WBC 12.16 (A)  11.38 (A)   12.78 (A)    Hemoglobin 11.6 (A)  12.4   13.5    Hematocrit 35.9  37.0   40.4    Platelets 574 (A)  339   327    Total Cholesterol     164     Triglycerides     41     HDL Cholesterol     65 (A)     LDL Cholesterol      90     (A) Abnormal value            Data reviewed: Radiologic studies sinus xr           Assessment and Plan    Diagnoses and all orders for this visit:    1. Essential hypertension (Primary)  Assessment & Plan:  Hypertension is improving with treatment.  Continue current treatment regimen.  Dietary sodium restriction.  Regular aerobic exercise.  Blood pressure will be reassessed at the next regular appointment.      2. Dizziness  Comments:  Discussed possible causes including motion sickness, BPV, blood pressure, blood sugar issues and other   meclizine 25mg PO tid PRN   Discussed safety measures     3. Nonintractable headache, unspecified chronicity pattern, unspecified headache type  Comments:  Tylenol/motrin UAD prn       4. Allergic rhinitis, unspecified seasonality, unspecified trigger  Comments:  Continue daily fluticasone and zyrtec use     Other orders  -     meclizine (ANTIVERT) 25 MG tablet; Take 1 tablet by mouth 3 (Three) Times a Day As Needed for Dizziness.  Dispense: 30 tablet; Refill: 5      Follow Up   No follow-ups on file.  Patient was given instructions and counseling regarding her condition or for health maintenance advice. Please see specific information pulled into the AVS if appropriate.

## 2022-03-18 ENCOUNTER — APPOINTMENT (OUTPATIENT)
Dept: ULTRASOUND IMAGING | Facility: HOSPITAL | Age: 37
End: 2022-03-18

## 2022-03-18 ENCOUNTER — HOSPITAL ENCOUNTER (EMERGENCY)
Facility: HOSPITAL | Age: 37
Discharge: HOME | End: 2022-03-18
Attending: EMERGENCY MEDICINE | Admitting: EMERGENCY MEDICINE

## 2022-03-18 VITALS
HEART RATE: 70 BPM | BODY MASS INDEX: 27.99 KG/M2 | RESPIRATION RATE: 18 BRPM | HEIGHT: 65 IN | TEMPERATURE: 98 F | DIASTOLIC BLOOD PRESSURE: 87 MMHG | SYSTOLIC BLOOD PRESSURE: 143 MMHG | WEIGHT: 167.99 LBS | OXYGEN SATURATION: 100 %

## 2022-03-18 DIAGNOSIS — N83.202 CYST OF LEFT OVARY: Primary | ICD-10-CM

## 2022-03-18 LAB
ALBUMIN SERPL-MCNC: 4.8 G/DL (ref 3.5–5.2)
ALBUMIN/GLOB SERPL: 1.4 G/DL
ALP SERPL-CCNC: 93 U/L (ref 39–117)
ALT SERPL W P-5'-P-CCNC: 14 U/L (ref 1–33)
ANION GAP SERPL CALCULATED.3IONS-SCNC: 7.7 MMOL/L (ref 5–15)
AST SERPL-CCNC: 19 U/L (ref 1–32)
BASOPHILS # BLD AUTO: 0.04 10*3/MM3 (ref 0–0.2)
BASOPHILS NFR BLD AUTO: 0.4 % (ref 0–1.5)
BILIRUB SERPL-MCNC: 0.3 MG/DL (ref 0–1.2)
BILIRUB UR QL STRIP: NEGATIVE
BUN SERPL-MCNC: 10 MG/DL (ref 6–20)
BUN/CREAT SERPL: 13.5 (ref 7–25)
CALCIUM SPEC-SCNC: 9.5 MG/DL (ref 8.6–10.5)
CHLORIDE SERPL-SCNC: 101 MMOL/L (ref 98–107)
CLARITY UR: CLEAR
CO2 SERPL-SCNC: 26.3 MMOL/L (ref 22–29)
COLOR UR: YELLOW
CREAT SERPL-MCNC: 0.74 MG/DL (ref 0.57–1)
DEPRECATED RDW RBC AUTO: 47 FL (ref 37–54)
EGFRCR SERPLBLD CKD-EPI 2021: 106.4 ML/MIN/1.73
EOSINOPHIL # BLD AUTO: 0.08 10*3/MM3 (ref 0–0.4)
EOSINOPHIL NFR BLD AUTO: 0.8 % (ref 0.3–6.2)
ERYTHROCYTE [DISTWIDTH] IN BLOOD BY AUTOMATED COUNT: 14.5 % (ref 12.3–15.4)
GLOBULIN UR ELPH-MCNC: 3.4 GM/DL
GLUCOSE SERPL-MCNC: 90 MG/DL (ref 65–99)
GLUCOSE UR STRIP-MCNC: NEGATIVE MG/DL
HCT VFR BLD AUTO: 39.1 % (ref 34–46.6)
HGB BLD-MCNC: 12.6 G/DL (ref 12–15.9)
HGB UR QL STRIP.AUTO: NEGATIVE
HOLD SPECIMEN: NORMAL
HOLD SPECIMEN: NORMAL
IMM GRANULOCYTES # BLD AUTO: 0.02 10*3/MM3 (ref 0–0.05)
IMM GRANULOCYTES NFR BLD AUTO: 0.2 % (ref 0–0.5)
KETONES UR QL STRIP: NEGATIVE
LEUKOCYTE ESTERASE UR QL STRIP.AUTO: NEGATIVE
LIPASE SERPL-CCNC: 40 U/L (ref 13–60)
LYMPHOCYTES # BLD AUTO: 3.64 10*3/MM3 (ref 0.7–3.1)
LYMPHOCYTES NFR BLD AUTO: 38 % (ref 19.6–45.3)
MCH RBC QN AUTO: 28.5 PG (ref 26.6–33)
MCHC RBC AUTO-ENTMCNC: 32.2 G/DL (ref 31.5–35.7)
MCV RBC AUTO: 88.5 FL (ref 79–97)
MONOCYTES # BLD AUTO: 0.72 10*3/MM3 (ref 0.1–0.9)
MONOCYTES NFR BLD AUTO: 7.5 % (ref 5–12)
NEUTROPHILS NFR BLD AUTO: 5.08 10*3/MM3 (ref 1.7–7)
NEUTROPHILS NFR BLD AUTO: 53.1 % (ref 42.7–76)
NITRITE UR QL STRIP: NEGATIVE
NRBC BLD AUTO-RTO: 0 /100 WBC (ref 0–0.2)
PH UR STRIP.AUTO: 6.5 [PH] (ref 5–8)
PLATELET # BLD AUTO: 327 10*3/MM3 (ref 140–450)
PMV BLD AUTO: 9.3 FL (ref 6–12)
POTASSIUM SERPL-SCNC: 3.9 MMOL/L (ref 3.5–5.2)
PROT SERPL-MCNC: 8.2 G/DL (ref 6–8.5)
PROT UR QL STRIP: NEGATIVE
RBC # BLD AUTO: 4.42 10*6/MM3 (ref 3.77–5.28)
SODIUM SERPL-SCNC: 135 MMOL/L (ref 136–145)
SP GR UR STRIP: 1.01 (ref 1–1.03)
UROBILINOGEN UR QL STRIP: NORMAL
WBC NRBC COR # BLD: 9.58 10*3/MM3 (ref 3.4–10.8)
WHOLE BLOOD HOLD SPECIMEN: NORMAL
WHOLE BLOOD HOLD SPECIMEN: NORMAL

## 2022-03-18 PROCEDURE — 81003 URINALYSIS AUTO W/O SCOPE: CPT

## 2022-03-18 PROCEDURE — 76830 TRANSVAGINAL US NON-OB: CPT

## 2022-03-18 PROCEDURE — 80053 COMPREHEN METABOLIC PANEL: CPT | Performed by: EMERGENCY MEDICINE

## 2022-03-18 PROCEDURE — 85025 COMPLETE CBC W/AUTO DIFF WBC: CPT

## 2022-03-18 PROCEDURE — 99283 EMERGENCY DEPT VISIT LOW MDM: CPT

## 2022-03-18 PROCEDURE — 36415 COLL VENOUS BLD VENIPUNCTURE: CPT

## 2022-03-18 PROCEDURE — 83690 ASSAY OF LIPASE: CPT | Performed by: EMERGENCY MEDICINE

## 2022-03-18 RX ORDER — HYDROCODONE BITARTRATE AND ACETAMINOPHEN 5; 325 MG/1; MG/1
1 TABLET ORAL ONCE
Status: COMPLETED | OUTPATIENT
Start: 2022-03-18 | End: 2022-03-18

## 2022-03-18 RX ORDER — SODIUM CHLORIDE 0.9 % (FLUSH) 0.9 %
10 SYRINGE (ML) INJECTION AS NEEDED
Status: DISCONTINUED | OUTPATIENT
Start: 2022-03-18 | End: 2022-03-18 | Stop reason: HOSPADM

## 2022-03-18 RX ORDER — HYDROCODONE BITARTRATE AND ACETAMINOPHEN 5; 325 MG/1; MG/1
1 TABLET ORAL EVERY 6 HOURS PRN
Qty: 12 TABLET | Refills: 0 | Status: SHIPPED | OUTPATIENT
Start: 2022-03-18 | End: 2022-03-29

## 2022-03-18 RX ADMIN — HYDROCODONE BITARTRATE AND ACETAMINOPHEN 1 TABLET: 5; 325 TABLET ORAL at 17:27

## 2022-03-18 NOTE — ED PROVIDER NOTES
Time: 3:42 PM EDT  Arrived by: private car  Chief Complaint: Abdominal/pelvic pain  History provided by: Patient  History is limited by: N/A     History of Present Illness:  Patient is a 36 y.o. year old female who presents to the emergency department with abdominal/pelvic pain that started yesterday.  Patient states the pain was initially intermittent but is now constant today.  She describes the pain is left lower quadrant to the left adnexal, sharp, much worse with movement/bumps on the road.  She has nausea but no vomiting or diarrhea.  No constipation.  Pain radiates to her right lower abdomen into her right flank area.  States this is not a chronic pain.  No alleviating factors.  Patient has been afebrile and denies any dysuria or vaginal bleeding/discharge.    HPI    Similar Symptoms Previously: Yes  Recently seen: No      Patient Care Team  Primary Care Provider: Maureen Plummer APRN    Past Medical History:     Allergies   Allergen Reactions   • Latex Hives     Tree nuts and peanuts   • Nuts Anaphylaxis     Past Medical History:   Diagnosis Date   • Allergic    • Anemia    • Anxiety    • Arthritis    • Asthma    • Cataract    • Depression    • Intermediate uveitis    • Migraine    • sinus trouble      Past Surgical History:   Procedure Laterality Date   • CHOLECYSTECTOMY  2006   • CYSTOSCOPY N/A 6/8/2021    Procedure: CYSTOSCOPY;  Surgeon: Cliff Pillai MD;  Location: [facility];  Service: Obstetrics/Gynecology;  Laterality: N/A;   • DIAGNOSTIC LAPAROSCOPY TOTAL ABDOMINAL HYSTERECTOMY N/A 6/8/2021    Procedure: DIAGNOSTIC LAPAROSCOPY TOTAL ABDOMINAL HYSTERECTOMY, BILATERAL SALPINGECTOMY;  Surgeon: Cliff Pillai MD;  Location: [facility] OR;  Service: Obstetrics/Gynecology;  Laterality: N/A;   • LAPAROSCOPIC LYSIS OF ADHESIONS N/A 6/30/2021    Procedure: LAPAROSCOPIC LYSIS OF ADHESIONS;  Surgeon: Cliff Pillai MD;  Location: [facility];  Service: Gynecology;   Laterality: N/A;   • OOPHORECTOMY Right 6/30/2021    Procedure: OOPHORECTOMY;  Surgeon: Cliff Pillai MD;  Location: [facility] OR;  Service: Obstetrics/Gynecology;  Laterality: Right;   • ORIF ANKLE FRACTURE Left      Family History   Problem Relation Age of Onset   • Cancer Maternal Aunt    • Cancer Maternal Grandmother    • Diabetes Paternal Grandfather    • Cancer Paternal Grandfather    • Other Mother    • Fibroids Mother    • Malig Hyperthermia Neg Hx        Home Medications:  Prior to Admission medications    Medication Sig Start Date End Date Taking? Authorizing Provider   Adalimumab (Humira) 40 MG/0.4ML Prefilled Syringe Kit injection Inject 40 mg under the skin into the appropriate area as directed Every 10 (Ten) Days.   Yes Pa Morrell MD   amLODIPine (NORVASC) 2.5 MG tablet Take 1 tablet by mouth Daily. 12/3/21  Yes Maureen Plummer APRN   buPROPion (WELLBUTRIN) 100 MG tablet Take 150 mg by mouth Daily.   Yes Pa Morrell MD   busPIRone (BUSPAR) 15 MG tablet Take 15 mg by mouth 3 (Three) Times a Day. Usually take at night x1   Yes aP Morrell MD   cetirizine (zyrTEC) 10 MG tablet Take 10 mg by mouth Daily.   Yes Pa Morrell MD   lamoTRIgine (LaMICtal) 100 MG tablet Take 150 mg by mouth 2 (Two) Times a Day.   Yes Pa Morrell MD   multivitamin with minerals tablet tablet Take 1 tablet by mouth Daily.   Yes Pa Morrell MD   traZODone (DESYREL) 100 MG tablet Take 150 mg by mouth Every Night.   Yes Pa Morrell MD   fluticasone (FLONASE) 50 MCG/ACT nasal spray 2 sprays into the nostril(s) as directed by provider Daily.    ProviderPa MD   ibuprofen (ADVIL,MOTRIN) 600 MG tablet Take 1 tablet by mouth Every 6 (Six) Hours As Needed for Mild Pain . 6/10/21   Cliff Pillai MD   meclizine (ANTIVERT) 25 MG tablet Take 1 tablet by mouth 3 (Three) Times a Day As Needed for Dizziness. 1/3/22   Maureen Plummer  "RachaelZEKE almonte        Social History:   Social History     Tobacco Use   • Smoking status: Current Every Day Smoker     Packs/day: 0.50     Years: 20.00     Pack years: 10.00   • Smokeless tobacco: Never Used   Vaping Use   • Vaping Use: Never used   Substance Use Topics   • Alcohol use: Not Currently     Comment: SOCIAL   • Drug use: Never     Recent travel: no     Review of Systems:  Review of Systems   Constitutional: Negative for chills and fever.   HENT: Negative for congestion, rhinorrhea and sore throat.    Eyes: Negative for pain and visual disturbance.   Respiratory: Negative for apnea, cough, chest tightness and shortness of breath.    Cardiovascular: Negative for chest pain and palpitations.   Gastrointestinal: Positive for abdominal pain and nausea. Negative for diarrhea and vomiting.   Genitourinary: Positive for pelvic pain. Negative for difficulty urinating and dysuria.   Musculoskeletal: Negative for joint swelling and myalgias.   Skin: Negative for color change.   Neurological: Negative for seizures and headaches.   Psychiatric/Behavioral: Negative.    All other systems reviewed and are negative.       Physical Exam:  /87   Pulse 70   Temp 98 °F (36.7 °C)   Resp 18   Ht 165.1 cm (65\")   Wt 76.2 kg (167 lb 15.9 oz)   LMP 05/20/2021 (Approximate)   SpO2 100%   BMI 27.96 kg/m²     Physical Exam  Vitals and nursing note reviewed.   Constitutional:       Appearance: Normal appearance.   HENT:      Head: Normocephalic and atraumatic.      Nose: Nose normal.      Mouth/Throat:      Mouth: Mucous membranes are dry.   Eyes:      Extraocular Movements: Extraocular movements intact.      Pupils: Pupils are equal, round, and reactive to light.   Cardiovascular:      Rate and Rhythm: Normal rate and regular rhythm.      Heart sounds: Normal heart sounds.   Pulmonary:      Effort: Pulmonary effort is normal.      Breath sounds: Normal breath sounds.   Abdominal:      General: Bowel sounds are normal. "      Palpations: Abdomen is soft.      Tenderness: There is abdominal tenderness in the left lower quadrant. There is left CVA tenderness.   Genitourinary:     Adnexa:         Left: Tenderness present.    Musculoskeletal:         General: No swelling. Normal range of motion.      Cervical back: Normal range of motion and neck supple.   Skin:     General: Skin is warm and dry.      Coloration: Skin is not jaundiced.   Neurological:      General: No focal deficit present.      Mental Status: She is alert and oriented to person, place, and time. Mental status is at baseline.   Psychiatric:         Mood and Affect: Mood normal.         Behavior: Behavior normal.         Judgment: Judgment normal.                Medications in the Emergency Department:  Medications   HYDROcodone-acetaminophen (NORCO) 5-325 MG per tablet 1 tablet (1 tablet Oral Given 3/18/22 1727)        Labs  Lab Results (last 24 hours)     Procedure Component Value Units Date/Time    CBC & Differential [[id number]]  (Abnormal) Collected: 03/18/22 1422    Specimen: Blood Updated: 03/18/22 3127    Narrative:      The following orders were created for panel order CBC & Differential.  Procedure                               Abnormality         Status                     ---------                               -----------         ------                     CBC Auto Differential[[id number]]        Abnormal            Final result                 Please view results for these tests on the individual orders.    Comprehensive Metabolic Panel [[id number]]  (Abnormal) Collected: 03/18/22 1422    Specimen: Blood Updated: 03/18/22 1521     Glucose 90 mg/dL      BUN 10 mg/dL      Creatinine 0.74 mg/dL      Sodium 135 mmol/L      Potassium 3.9 mmol/L      Chloride 101 mmol/L      CO2 26.3 mmol/L      Calcium 9.5 mg/dL      Total Protein 8.2 g/dL      Albumin 4.80 g/dL      ALT (SGPT) 14 U/L      AST (SGOT) 19 U/L      Alkaline Phosphatase 93 U/L      Total Bilirubin 0.3  mg/dL      Globulin 3.4 gm/dL      A/G Ratio 1.4 g/dL      BUN/Creatinine Ratio 13.5     Anion Gap 7.7 mmol/L      eGFR 106.4 mL/min/1.73      Comment: National Kidney Foundation and American Society of Nephrology (ASN) Task Force recommended calculation based on the Chronic Kidney Disease Epidemiology Collaboration (CKD-EPI) equation refit without adjustment for race.       Narrative:      GFR Normal >60  Chronic Kidney Disease <60  Kidney Failure <15      Lipase [[id number]]  (Normal) Collected: 03/18/22 1422    Specimen: Blood Updated: 03/18/22 1521     Lipase 40 U/L     CBC Auto Differential [[id number]]  (Abnormal) Collected: 03/18/22 1422    Specimen: Blood Updated: 03/18/22 1459     WBC 9.58 10*3/mm3      RBC 4.42 10*6/mm3      Hemoglobin 12.6 g/dL      Hematocrit 39.1 %      MCV 88.5 fL      MCH 28.5 pg      MCHC 32.2 g/dL      RDW 14.5 %      RDW-SD 47.0 fl      MPV 9.3 fL      Platelets 327 10*3/mm3      Neutrophil % 53.1 %      Lymphocyte % 38.0 %      Monocyte % 7.5 %      Eosinophil % 0.8 %      Basophil % 0.4 %      Immature Grans % 0.2 %      Neutrophils, Absolute 5.08 10*3/mm3      Lymphocytes, Absolute 3.64 10*3/mm3      Monocytes, Absolute 0.72 10*3/mm3      Eosinophils, Absolute 0.08 10*3/mm3      Basophils, Absolute 0.04 10*3/mm3      Immature Grans, Absolute 0.02 10*3/mm3      nRBC 0.0 /100 WBC     Urinalysis With Microscopic If Indicated (No Culture) - Urine, Clean Catch [[id number]]  (Normal) Collected: 03/18/22 1429    Specimen: Urine, Clean Catch Updated: 03/18/22 1500     Color, UA Yellow     Appearance, UA Clear     pH, UA 6.5     Specific Gravity, UA 1.013     Glucose, UA Negative     Ketones, UA Negative     Bilirubin, UA Negative     Blood, UA Negative     Protein, UA Negative     Leuk Esterase, UA Negative     Nitrite, UA Negative     Urobilinogen, UA 1.0 E.U./dL    Narrative:      Urine microscopic not indicated.           Imaging:  US Non-ob Transvaginal    Result Date:  3/18/2022  PROCEDURE: US PELVIC NON-OB WITH DOPPLER  COMPARISON: [facility], CT, CT ABDOMEN PELVIS W CONTRAST, 6/29/2021, 23:44.  [facility], US, US NON-OB TRANSVAGINAL, 6/30/2021, 2:37.  INDICATIONS: Left adnexal pain  FINDINGS: Transvaginal and transabdominal pelvic ultrasound was performed.  Prior hysterectomy and left oophorectomy.  The left ovary measures 4.3 cm x 2.5 cm x 2.6 cm.  There is a 2.8 cm cyst in the left ovary with a thin internal septation.  Flow is present in the left ovary on Doppler imaging.  There is no abnormal pelvic free fluid.          1. Prior hysterectomy and left oophorectomy.  2. 2.8 cm left ovarian cyst with a thin internal septation.  No evidence of left ovarian torsion.       OLGA JADE MD       Electronically Signed and Approved By: OLGA JADE MD on 3/18/2022 at 16:37               Procedures:  Procedures    Progress                            Medical Decision Making:  MDM  Number of Diagnoses or Management Options  Cyst of left ovary: new and requires workup     Amount and/or Complexity of Data Reviewed  Clinical lab tests: reviewed  Tests in the radiology section of CPT®: reviewed    Risk of Complications, Morbidity, and/or Mortality  Presenting problems: moderate  Management options: low    Patient Progress  Patient progress: stable       Final diagnoses:   Cyst of left ovary        Disposition:  ED Disposition     ED Disposition   Discharge    Condition   Stable    Comment   --             This medical record created using voice recognition software and may contain unintended errors.           Sanjay Dill MD  03/18/22 2000

## 2022-03-29 ENCOUNTER — OFFICE VISIT (OUTPATIENT)
Dept: FAMILY MEDICINE CLINIC | Facility: CLINIC | Age: 37
End: 2022-03-29

## 2022-03-29 VITALS
TEMPERATURE: 97.5 F | HEART RATE: 84 BPM | HEIGHT: 65 IN | BODY MASS INDEX: 27.52 KG/M2 | DIASTOLIC BLOOD PRESSURE: 82 MMHG | OXYGEN SATURATION: 99 % | WEIGHT: 165.2 LBS | SYSTOLIC BLOOD PRESSURE: 128 MMHG

## 2022-03-29 DIAGNOSIS — J02.9 ACUTE PHARYNGITIS, UNSPECIFIED ETIOLOGY: ICD-10-CM

## 2022-03-29 DIAGNOSIS — B37.9 YEAST INFECTION: ICD-10-CM

## 2022-03-29 DIAGNOSIS — N83.209 OVARIAN CYST RUPTURE: Primary | ICD-10-CM

## 2022-03-29 DIAGNOSIS — J30.9 ALLERGIC RHINITIS, UNSPECIFIED SEASONALITY, UNSPECIFIED TRIGGER: ICD-10-CM

## 2022-03-29 DIAGNOSIS — J02.9 SORE THROAT: ICD-10-CM

## 2022-03-29 PROCEDURE — 99214 OFFICE O/P EST MOD 30 MIN: CPT | Performed by: NURSE PRACTITIONER

## 2022-03-29 RX ORDER — METHYLPREDNISOLONE 4 MG/1
TABLET ORAL
Qty: 1 EACH | Refills: 0 | Status: SHIPPED | OUTPATIENT
Start: 2022-03-29 | End: 2022-04-15

## 2022-03-29 RX ORDER — AZITHROMYCIN 250 MG/1
TABLET, FILM COATED ORAL
Qty: 6 TABLET | Refills: 0 | Status: SHIPPED | OUTPATIENT
Start: 2022-03-29 | End: 2022-04-15

## 2022-03-29 RX ORDER — FLUCONAZOLE 150 MG/1
150 TABLET ORAL ONCE
Qty: 2 TABLET | Refills: 0 | Status: SHIPPED | OUTPATIENT
Start: 2022-03-29 | End: 2022-03-29

## 2022-03-29 RX ORDER — ALBUTEROL SULFATE 90 UG/1
2 AEROSOL, METERED RESPIRATORY (INHALATION) SEE ADMIN INSTRUCTIONS
COMMUNITY
Start: 2022-01-31 | End: 2023-03-20

## 2022-03-29 NOTE — PROGRESS NOTES
"Chief Complaint  Sore Throat (Lots of nasal drainage, started hurting yesterday, no fever) and Follow-up (ER visit - had cyst on ovary, patient started bleeding and went to ER)    Subjective          Bhargavi Jeronimo presents to [facility]  Pt presents for ED FU for L ovarian cyst rupture. States pain has mostly resolved, taking naproxen PRN for pain.     Pt c/o sore throat, nasal congestion, cough x 5 days. Taking sudafed to treat. Denies fever, chills, SOB, HA or other.     Reviewed all recent labs and medications.      Objective   Vital Signs:   /82   Pulse 84   Temp 97.5 °F (36.4 °C)   Ht 165.1 cm (65\")   Wt 74.9 kg (165 lb 3.2 oz)   SpO2 99%   BMI 27.49 kg/m²     BMI is above normal parameters. Recommendations: exercise counseling/recommendations       Physical Exam  Vitals reviewed.   Constitutional:       General: She is not in acute distress.     Appearance: Normal appearance.   HENT:      Head: Normocephalic.      Right Ear: Tympanic membrane normal.      Left Ear: Tympanic membrane normal.      Nose: Nose normal.      Mouth/Throat:      Pharynx: Oropharynx is clear. Posterior oropharyngeal erythema present.      Tonsils: 1+ on the right. 1+ on the left.   Eyes:      General: No scleral icterus.     Extraocular Movements: Extraocular movements intact.      Conjunctiva/sclera: Conjunctivae normal.      Pupils: Pupils are equal, round, and reactive to light.   Cardiovascular:      Rate and Rhythm: Normal rate and regular rhythm.      Pulses: Normal pulses.      Heart sounds: Normal heart sounds.   Pulmonary:      Effort: Pulmonary effort is normal.      Breath sounds: Normal breath sounds.   Abdominal:      General: Bowel sounds are normal.      Palpations: Abdomen is soft.   Musculoskeletal:         General: Normal range of motion.      Cervical back: Neck supple.   Lymphadenopathy:      Cervical: Cervical adenopathy present.      Right cervical: Posterior " cervical adenopathy present.   Skin:     General: Skin is warm and dry.   Neurological:      Mental Status: She is alert and oriented to person, place, and time.   Psychiatric:         Mood and Affect: Mood normal.         Behavior: Behavior normal.         Thought Content: Thought content normal.         Judgment: Judgment normal.        Result Review :     Common labs    Common Labsle 11/17/21 11/17/21 11/17/21 11/30/21 11/30/21 3/18/22 3/18/22    1142 1142 1142 1644 1644 1422 1422   Glucose  75   106 (A)  90   BUN  7   12  10   Creatinine  0.73   0.66  0.74   eGFR  Am  108   121     Sodium  138   132 (A)  135 (A)   Potassium  4.2   4.6  3.9   Chloride  103   98  101   Calcium  9.5   9.8  9.5   Albumin  4.80   4.80  4.80   Total Bilirubin  0.5   0.2  0.3   Alkaline Phosphatase  89   90  93   AST (SGOT)  25   22  19   ALT (SGPT)  15   18  14   WBC 11.38 (A)   12.78 (A)  9.58    Hemoglobin 12.4   13.5  12.6    Hematocrit 37.0   40.4  39.1    Platelets 339   327  327    Total Cholesterol   164       Triglycerides   41       HDL Cholesterol   65 (A)       LDL Cholesterol    90       (A) Abnormal value            Data reviewed: Recent hospitalization notes ED March 2022          Assessment and Plan    Diagnoses and all orders for this visit:    1. Ovarian cyst rupture (Primary)  Comments:  Improving   CTM       2. Allergic rhinitis, unspecified seasonality, unspecified trigger  Comments:  Controlled  Continue medications as prescribed   Avoid allergy triggers     3. Sore throat  Comments:  salt water gargles 30 sec TID Prn  tyl/Motrin UAD prn pain    4. Acute pharyngitis, unspecified etiology  Comments:  zpack UAD   medrol dose pain UAD   Increase rest   Increase clear fluids     5. Yeast infection  Comments:  diflucan 150mg PO x 1 d/t antibiotic use       Other orders  -     methylPREDNISolone (MEDROL) 4 MG dose pack; Take as directed on package instructions.  Dispense: 1 each; Refill: 0  -     azithromycin  (Zithromax Z-Brent) 250 MG tablet; UAD  Dispense: 6 tablet; Refill: 0  -     fluconazole (Diflucan) 150 MG tablet; Take 1 tablet by mouth 1 (One) Time for 1 dose. May repeat in 3 days if needed  Dispense: 2 tablet; Refill: 0        Follow Up   Return if symptoms worsen or fail to improve.  Patient was given instructions and counseling regarding her condition or for health maintenance advice. Please see specific information pulled into the AVS if appropriate.

## 2022-04-15 ENCOUNTER — OFFICE VISIT (OUTPATIENT)
Dept: FAMILY MEDICINE CLINIC | Facility: CLINIC | Age: 37
End: 2022-04-15

## 2022-04-15 VITALS
RESPIRATION RATE: 20 BRPM | TEMPERATURE: 97 F | SYSTOLIC BLOOD PRESSURE: 119 MMHG | BODY MASS INDEX: 27.99 KG/M2 | HEART RATE: 80 BPM | WEIGHT: 168 LBS | DIASTOLIC BLOOD PRESSURE: 75 MMHG | HEIGHT: 65 IN | OXYGEN SATURATION: 100 %

## 2022-04-15 DIAGNOSIS — Z00.00 ANNUAL PHYSICAL EXAM: Primary | ICD-10-CM

## 2022-04-15 DIAGNOSIS — F31.9 BIPOLAR AFFECTIVE DISORDER, REMISSION STATUS UNSPECIFIED: ICD-10-CM

## 2022-04-15 DIAGNOSIS — F32.A DEPRESSION, UNSPECIFIED DEPRESSION TYPE: ICD-10-CM

## 2022-04-15 DIAGNOSIS — I10 ESSENTIAL HYPERTENSION: ICD-10-CM

## 2022-04-15 DIAGNOSIS — F41.9 ANXIETY: ICD-10-CM

## 2022-04-15 DIAGNOSIS — R21 RASH: ICD-10-CM

## 2022-04-15 PROCEDURE — 99395 PREV VISIT EST AGE 18-39: CPT | Performed by: NURSE PRACTITIONER

## 2022-04-15 RX ORDER — ADALIMUMAB 40MG/0.4ML
KIT SUBCUTANEOUS
COMMUNITY
Start: 2022-04-11 | End: 2022-04-15 | Stop reason: SDUPTHER

## 2022-04-15 RX ORDER — TRAZODONE HYDROCHLORIDE 100 MG/1
150 TABLET ORAL DAILY
COMMUNITY
End: 2023-03-20 | Stop reason: DRUGHIGH

## 2022-04-15 NOTE — ASSESSMENT & PLAN NOTE
Discussed age appropriate preventative counseling. Written information provided to patient. All questions answered. Pt verabalized understanding.

## 2022-04-15 NOTE — PROGRESS NOTES
Chief Complaint   Patient presents with   • Rash     Bottom of feet, bilateral.       Subjective          Bhargavi Jeronimo presents to [facility]    Pt presents for annual physical. Reviewed all recent labs and medications. Pt presents with spot on bottom of feet x 2 weeks. Noticed when she returned home from [place]. Denies pain, fever, itching, injury, bites or other.       Past History:  Medical History: has a past medical history of Allergic, Anemia, Anxiety, Arthritis, Asthma, Cataract, Depression, Intermediate uveitis, Migraine, and sinus trouble.   Surgical History: has a past surgical history that includes ORIF ankle fracture (Left); Cholecystectomy (2006); Cystoscopy (N/A, 6/8/2021); diagnostic laparoscopy total abdominal hysterectomy (N/A, 6/8/2021); Oophorectomy (Right, 6/30/2021); and laparoscopic lysis of adhesions (N/A, 6/30/2021).   Family History: family history includes Cancer in her maternal aunt, maternal grandmother, and paternal grandfather; Diabetes in her paternal grandfather; Fibroids in her mother; Other in her mother.   Social History: reports that she has been smoking cigarettes. She started smoking about 19 years ago. She has a 10.00 pack-year smoking history. She has never used smokeless tobacco. She reports previous alcohol use. She reports that she does not use drugs.  Allergies: Latex and Nuts  (Not in a hospital admission)       Social History     Socioeconomic History   • Marital status: Single   Tobacco Use   • Smoking status: Current Every Day Smoker     Packs/day: 0.50     Years: 20.00     Pack years: 10.00     Types: Cigarettes     Start date: 4/30/2002   • Smokeless tobacco: Never Used   Vaping Use   • Vaping Use: Never used   Substance and Sexual Activity   • Alcohol use: Not Currently     Comment: SOCIAL   • Drug use: Never   • Sexual activity: Defer       Health Maintenance Due   Topic Date Due   • HEPATITIS C SCREENING  Never done  "      Objective     Vital Signs:   /75   Pulse 80   Temp 97 °F (36.1 °C)   Resp 20   Ht 165.1 cm (65\")   Wt 76.2 kg (168 lb)   SpO2 100%   BMI 27.96 kg/m²       Physical Exam  Vitals reviewed.   Constitutional:       General: She is not in acute distress.     Appearance: Normal appearance.   HENT:      Head: Normocephalic.      Right Ear: Tympanic membrane normal.      Left Ear: Tympanic membrane normal.      Nose: Nose normal.      Mouth/Throat:      Pharynx: Oropharynx is clear. No posterior oropharyngeal erythema.   Eyes:      General: No scleral icterus.     Extraocular Movements: Extraocular movements intact.      Conjunctiva/sclera: Conjunctivae normal.      Pupils: Pupils are equal, round, and reactive to light.   Cardiovascular:      Rate and Rhythm: Normal rate and regular rhythm.      Pulses: Normal pulses.      Heart sounds: Normal heart sounds.   Pulmonary:      Effort: Pulmonary effort is normal.      Breath sounds: Normal breath sounds.   Abdominal:      General: Bowel sounds are normal.      Palpations: Abdomen is soft.   Musculoskeletal:         General: Normal range of motion.      Cervical back: Neck supple.   Feet:      Right foot:      Skin integrity: Skin integrity normal.      Toenail Condition: Right toenails are normal.      Left foot:      Skin integrity: Skin integrity normal.      Toenail Condition: Left toenails are normal.      Comments: bilat plantar surface scattered lesions, appears to be nevi, no erythema, no edema   Skin:     General: Skin is warm and dry.   Neurological:      Mental Status: She is alert and oriented to person, place, and time.   Psychiatric:         Mood and Affect: Mood normal.         Behavior: Behavior normal.         Thought Content: Thought content normal.         Judgment: Judgment normal.          Review of Systems   Constitutional: Negative for chills, fatigue and fever.   HENT: Negative for congestion, ear pain, sinus pressure and sore throat. "    Eyes: Negative for blurred vision.   Respiratory: Negative for cough and shortness of breath.    Cardiovascular: Negative for chest pain, palpitations and leg swelling.   Gastrointestinal: Negative for abdominal pain, constipation, diarrhea, nausea, vomiting and GERD.   Musculoskeletal: Negative for arthralgias.   Skin: Positive for rash. Negative for skin lesions.   Neurological: Negative for dizziness and headache.   Psychiatric/Behavioral: Negative for sleep disturbance, suicidal ideas and depressed mood. The patient is not nervous/anxious.         Result Review :     Common labs    Common Labsle 11/17/21 11/17/21 11/17/21 11/30/21 11/30/21 3/18/22 3/18/22    1142 1142 1142 1644 1644 1422 1422   Glucose  75   106 (A)  90   BUN  7   12  10   Creatinine  0.73   0.66  0.74   eGFR  Am  108   121     Sodium  138   132 (A)  135 (A)   Potassium  4.2   4.6  3.9   Chloride  103   98  101   Calcium  9.5   9.8  9.5   Albumin  4.80   4.80  4.80   Total Bilirubin  0.5   0.2  0.3   Alkaline Phosphatase  89   90  93   AST (SGOT)  25   22  19   ALT (SGPT)  15   18  14   WBC 11.38 (A)   12.78 (A)  9.58    Hemoglobin 12.4   13.5  12.6    Hematocrit 37.0   40.4  39.1    Platelets 339   327  327    Total Cholesterol   164       Triglycerides   41       HDL Cholesterol   65 (A)       LDL Cholesterol    90       (A) Abnormal value                      Assessment and Plan    Diagnoses and all orders for this visit:    1. Annual physical exam (Primary)  Assessment & Plan:  Discussed age appropriate preventative counseling. Written information provided to patient. All questions answered. Pt verabalized understanding.         2. Rash  Comments:  CTM   Notify if change in shape, size, pain, itching, discharge occur     3. Depression, unspecified depression type  Assessment & Plan:  Patient's depression is recurrent and is mild without psychosis. Their depression is currently active and the condition is improving with treatment.  This will be reassessed at the next regular appointment. F/U as described:patient will continue current medication therapy.      4. Anxiety    5. Essential hypertension  Assessment & Plan:  Hypertension is improving with treatment.  Continue current treatment regimen.  Dietary sodium restriction.  Regular aerobic exercise.  Blood pressure will be reassessed at the next regular appointment.      6. Bipolar affective disorder, remission status unspecified (HCC)  Assessment & Plan:  Psychological condition is improving with treatment.  Continue current treatment regimen.  Psychological condition  will be reassessed at the next regular appointment.          Follow Up   Return in about 6 months (around 10/15/2022), or if symptoms worsen or fail to improve.  Patient was given instructions and counseling regarding her condition or for health maintenance advice. Please see specific information pulled into the AVS if appropriate.

## 2022-07-14 ENCOUNTER — OFFICE VISIT (OUTPATIENT)
Dept: FAMILY MEDICINE CLINIC | Facility: CLINIC | Age: 37
End: 2022-07-14

## 2022-07-14 VITALS
DIASTOLIC BLOOD PRESSURE: 83 MMHG | HEART RATE: 83 BPM | OXYGEN SATURATION: 97 % | RESPIRATION RATE: 16 BRPM | BODY MASS INDEX: 28.92 KG/M2 | SYSTOLIC BLOOD PRESSURE: 131 MMHG | HEIGHT: 65 IN | TEMPERATURE: 99.8 F | WEIGHT: 173.6 LBS

## 2022-07-14 DIAGNOSIS — J30.2 SEASONAL ALLERGIES: ICD-10-CM

## 2022-07-14 DIAGNOSIS — B37.9 YEAST INFECTION: ICD-10-CM

## 2022-07-14 DIAGNOSIS — J30.9 ALLERGIC RHINITIS, UNSPECIFIED SEASONALITY, UNSPECIFIED TRIGGER: Primary | ICD-10-CM

## 2022-07-14 PROCEDURE — 99214 OFFICE O/P EST MOD 30 MIN: CPT

## 2022-07-14 RX ORDER — FLUCONAZOLE 150 MG/1
150 TABLET ORAL ONCE
Qty: 1 TABLET | Refills: 0 | Status: SHIPPED | OUTPATIENT
Start: 2022-07-14 | End: 2022-07-14

## 2022-07-14 RX ORDER — METHYLPREDNISOLONE 4 MG/1
TABLET ORAL
Qty: 1 EACH | Refills: 0 | Status: SHIPPED | OUTPATIENT
Start: 2022-07-14 | End: 2022-12-07 | Stop reason: SDUPTHER

## 2022-07-14 RX ORDER — BUPROPION HYDROCHLORIDE 150 MG/1
TABLET ORAL
COMMUNITY
Start: 2022-06-20 | End: 2023-03-20 | Stop reason: SDUPTHER

## 2022-07-14 RX ORDER — AZITHROMYCIN 250 MG/1
TABLET, FILM COATED ORAL
Qty: 6 TABLET | Refills: 0 | Status: SHIPPED | OUTPATIENT
Start: 2022-07-14 | End: 2023-03-20

## 2022-07-14 NOTE — PROGRESS NOTES
Chief Complaint   Patient presents with   • Sore Throat     From drainage going down back of throat. Has taken three home coivd test and doesn't want to do another one.    • Cough   • Nasal Congestion     Drainage        Subjective     {Problem List  Visit Diagnosis   Encounters  Notes  Medications  Labs  Result Review Imaging  Media :23}     Bhargavi Jeronimo presents to [facility]    She Is here for an acute visit. She has been having sore throat in the mornings, cough, and nasal drainage that is yellow/green in color. She states she has allergies and this occurs all the time but the yellow nasal discharge is not normal for her. She tested herself Tuesday night, yesterday morning, and this morning for COVID and all were negative. She does not want tested here today.     She does not have any medication allergies.       Past History:  Medical History: has a past medical history of Allergic, Anemia, Anxiety, Arthritis, Asthma, Cataract, Depression, Intermediate uveitis, Migraine, and sinus trouble.   Surgical History: has a past surgical history that includes ORIF ankle fracture (Left); Cholecystectomy (2006); Cystoscopy (N/A, 6/8/2021); diagnostic laparoscopy total abdominal hysterectomy (N/A, 6/8/2021); Oophorectomy (Right, 6/30/2021); and laparoscopic lysis of adhesions (N/A, 6/30/2021).   Family History: family history includes Cancer in her maternal aunt, maternal grandmother, and paternal grandfather; Diabetes in her paternal grandfather; Fibroids in her mother; Other in her mother.   Social History: reports that she has been smoking cigarettes. She started smoking about 20 years ago. She has a 10.00 pack-year smoking history. She has never used smokeless tobacco. She reports previous alcohol use. She reports that she does not use drugs.  Allergies: Latex and Nuts  (Not in a hospital admission)       Social History     Socioeconomic History   • Marital status: Single  "  Tobacco Use   • Smoking status: Current Every Day Smoker     Packs/day: 0.50     Years: 20.00     Pack years: 10.00     Types: Cigarettes     Start date: 4/30/2002   • Smokeless tobacco: Never Used   Vaping Use   • Vaping Use: Never used   Substance and Sexual Activity   • Alcohol use: Not Currently     Comment: SOCIAL   • Drug use: Never   • Sexual activity: Defer       There are no preventive care reminders to display for this patient.    Objective     Vital Signs:   /83 (BP Location: Left arm, Patient Position: Sitting)   Pulse 83   Temp 99.8 °F (37.7 °C) (Infrared)   Resp 16   Ht 165.1 cm (65\")   Wt 78.7 kg (173 lb 9.6 oz)   SpO2 97%   BMI 28.89 kg/m²       Physical Exam  Constitutional:       Appearance: Normal appearance.   HENT:      Nose: Nose normal.      Mouth/Throat:      Mouth: Mucous membranes are moist.   Cardiovascular:      Rate and Rhythm: Normal rate.   Pulmonary:      Effort: Pulmonary effort is normal.   Skin:     General: Skin is warm and dry.   Neurological:      General: No focal deficit present.      Mental Status: She is alert and oriented to person, place, and time.   Psychiatric:         Mood and Affect: Mood normal.         Behavior: Behavior normal.          Review of Systems   HENT: Positive for congestion, rhinorrhea and sore throat.    Respiratory: Positive for cough.    All other systems reviewed and are negative.       Result Review :                 Assessment and Plan    Diagnoses and all orders for this visit:    1. Allergic rhinitis, unspecified seasonality, unspecified trigger (Primary)  -     azithromycin (Zithromax Z-Brent) 250 MG tablet; Take 2 tablets by mouth on day 1, then 1 tablet daily on days 2-5  Dispense: 6 tablet; Refill: 0  -     methylPREDNISolone (MEDROL) 4 MG dose pack; Take as directed on package instructions.  Dispense: 1 each; Refill: 0    2. Seasonal allergies  Comments:  Takes Zyrtec daily. Flonase PRN.   Benadryl as needed.    3. Yeast " infection  -     fluconazole (Diflucan) 150 MG tablet; Take 1 tablet by mouth 1 (One) Time for 1 dose.  Dispense: 1 tablet; Refill: 0    I am giving her z rigo and medrol dose pack for treatment and diflucan to cover yeast infection that she gets with antibiotics.       Pt thought to be clinically stable at this time.    Follow Up   Return in about 4 weeks (around 8/11/2022), or if symptoms worsen or fail to improve.  Patient was given instructions and counseling regarding her condition or for health maintenance advice. Please see specific information pulled into the AVS if appropriate.

## 2022-07-27 RX ORDER — AMLODIPINE BESYLATE 2.5 MG/1
TABLET ORAL
Qty: 30 TABLET | Refills: 4 | Status: SHIPPED | OUTPATIENT
Start: 2022-07-27 | End: 2023-03-03

## 2022-12-07 ENCOUNTER — TELEPHONE (OUTPATIENT)
Dept: FAMILY MEDICINE CLINIC | Facility: CLINIC | Age: 37
End: 2022-12-07

## 2022-12-07 ENCOUNTER — APPOINTMENT (OUTPATIENT)
Dept: GENERAL RADIOLOGY | Facility: HOSPITAL | Age: 37
End: 2022-12-07

## 2022-12-07 ENCOUNTER — HOSPITAL ENCOUNTER (EMERGENCY)
Facility: HOSPITAL | Age: 37
Discharge: HOME | End: 2022-12-07
Attending: EMERGENCY MEDICINE | Admitting: EMERGENCY MEDICINE

## 2022-12-07 VITALS
WEIGHT: 171.52 LBS | RESPIRATION RATE: 18 BRPM | HEIGHT: 65 IN | TEMPERATURE: 97.3 F | DIASTOLIC BLOOD PRESSURE: 85 MMHG | OXYGEN SATURATION: 96 % | HEART RATE: 82 BPM | SYSTOLIC BLOOD PRESSURE: 144 MMHG | BODY MASS INDEX: 28.58 KG/M2

## 2022-12-07 DIAGNOSIS — J20.9 ACUTE BRONCHITIS, UNSPECIFIED ORGANISM: ICD-10-CM

## 2022-12-07 DIAGNOSIS — J11.1 INFLUENZA: Primary | ICD-10-CM

## 2022-12-07 LAB
ALBUMIN SERPL-MCNC: 4.6 G/DL (ref 3.5–5.2)
ALBUMIN/GLOB SERPL: 1.3 G/DL
ALP SERPL-CCNC: 86 U/L (ref 39–117)
ALT SERPL W P-5'-P-CCNC: 18 U/L (ref 1–33)
ANION GAP SERPL CALCULATED.3IONS-SCNC: 10.3 MMOL/L (ref 5–15)
AST SERPL-CCNC: 28 U/L (ref 1–32)
BASOPHILS # BLD AUTO: 0.05 10*3/MM3 (ref 0–0.2)
BASOPHILS NFR BLD AUTO: 0.4 % (ref 0–1.5)
BILIRUB SERPL-MCNC: 0.2 MG/DL (ref 0–1.2)
BUN SERPL-MCNC: 8 MG/DL (ref 6–20)
BUN/CREAT SERPL: 12.1 (ref 7–25)
CALCIUM SPEC-SCNC: 9.1 MG/DL (ref 8.6–10.5)
CHLORIDE SERPL-SCNC: 98 MMOL/L (ref 98–107)
CO2 SERPL-SCNC: 26.7 MMOL/L (ref 22–29)
CREAT SERPL-MCNC: 0.66 MG/DL (ref 0.57–1)
DEPRECATED RDW RBC AUTO: 44.3 FL (ref 37–54)
EGFRCR SERPLBLD CKD-EPI 2021: 114.6 ML/MIN/1.73
EOSINOPHIL # BLD AUTO: 0.12 10*3/MM3 (ref 0–0.4)
EOSINOPHIL NFR BLD AUTO: 0.9 % (ref 0.3–6.2)
ERYTHROCYTE [DISTWIDTH] IN BLOOD BY AUTOMATED COUNT: 13.6 % (ref 12.3–15.4)
GLOBULIN UR ELPH-MCNC: 3.6 GM/DL
GLUCOSE SERPL-MCNC: 87 MG/DL (ref 65–99)
HCT VFR BLD AUTO: 41.4 % (ref 34–46.6)
HGB BLD-MCNC: 14.1 G/DL (ref 12–15.9)
HOLD SPECIMEN: NORMAL
IMM GRANULOCYTES # BLD AUTO: 0.03 10*3/MM3 (ref 0–0.05)
IMM GRANULOCYTES NFR BLD AUTO: 0.2 % (ref 0–0.5)
LIPASE SERPL-CCNC: 36 U/L (ref 13–60)
LYMPHOCYTES # BLD AUTO: 5.09 10*3/MM3 (ref 0.7–3.1)
LYMPHOCYTES NFR BLD AUTO: 37 % (ref 19.6–45.3)
MAGNESIUM SERPL-MCNC: 1.8 MG/DL (ref 1.6–2.6)
MCH RBC QN AUTO: 29.9 PG (ref 26.6–33)
MCHC RBC AUTO-ENTMCNC: 34.1 G/DL (ref 31.5–35.7)
MCV RBC AUTO: 87.9 FL (ref 79–97)
MONOCYTES # BLD AUTO: 0.82 10*3/MM3 (ref 0.1–0.9)
MONOCYTES NFR BLD AUTO: 6 % (ref 5–12)
NEUTROPHILS NFR BLD AUTO: 55.5 % (ref 42.7–76)
NEUTROPHILS NFR BLD AUTO: 7.66 10*3/MM3 (ref 1.7–7)
NRBC BLD AUTO-RTO: 0 /100 WBC (ref 0–0.2)
NT-PROBNP SERPL-MCNC: <36 PG/ML (ref 0–450)
PLATELET # BLD AUTO: 297 10*3/MM3 (ref 140–450)
PMV BLD AUTO: 9.2 FL (ref 6–12)
POTASSIUM SERPL-SCNC: 4.1 MMOL/L (ref 3.5–5.2)
PROT SERPL-MCNC: 8.2 G/DL (ref 6–8.5)
QT INTERVAL: 370 MS
QT INTERVAL: 386 MS
RBC # BLD AUTO: 4.71 10*6/MM3 (ref 3.77–5.28)
SODIUM SERPL-SCNC: 135 MMOL/L (ref 136–145)
TROPONIN I SERPL-MCNC: 0 NG/ML (ref 0–0.08)
TROPONIN I SERPL-MCNC: 0 NG/ML (ref 0–0.08)
WBC NRBC COR # BLD: 13.77 10*3/MM3 (ref 3.4–10.8)
WHOLE BLOOD HOLD COAG: NORMAL
WHOLE BLOOD HOLD SPECIMEN: NORMAL

## 2022-12-07 PROCEDURE — 93010 ELECTROCARDIOGRAM REPORT: CPT | Performed by: SPECIALIST

## 2022-12-07 PROCEDURE — 83690 ASSAY OF LIPASE: CPT

## 2022-12-07 PROCEDURE — 25010000002 METHYLPREDNISOLONE PER 125 MG: Performed by: EMERGENCY MEDICINE

## 2022-12-07 PROCEDURE — 36415 COLL VENOUS BLD VENIPUNCTURE: CPT

## 2022-12-07 PROCEDURE — 71045 X-RAY EXAM CHEST 1 VIEW: CPT

## 2022-12-07 PROCEDURE — 80053 COMPREHEN METABOLIC PANEL: CPT

## 2022-12-07 PROCEDURE — 83880 ASSAY OF NATRIURETIC PEPTIDE: CPT

## 2022-12-07 PROCEDURE — 93005 ELECTROCARDIOGRAM TRACING: CPT | Performed by: EMERGENCY MEDICINE

## 2022-12-07 PROCEDURE — 83735 ASSAY OF MAGNESIUM: CPT

## 2022-12-07 PROCEDURE — 94799 UNLISTED PULMONARY SVC/PX: CPT

## 2022-12-07 PROCEDURE — 93005 ELECTROCARDIOGRAM TRACING: CPT

## 2022-12-07 PROCEDURE — 99284 EMERGENCY DEPT VISIT MOD MDM: CPT

## 2022-12-07 PROCEDURE — 85025 COMPLETE CBC W/AUTO DIFF WBC: CPT

## 2022-12-07 PROCEDURE — 94640 AIRWAY INHALATION TREATMENT: CPT

## 2022-12-07 PROCEDURE — 84484 ASSAY OF TROPONIN QUANT: CPT

## 2022-12-07 PROCEDURE — 96374 THER/PROPH/DIAG INJ IV PUSH: CPT

## 2022-12-07 RX ORDER — LIDOCAINE HYDROCHLORIDE 10 MG/ML
5 INJECTION, SOLUTION EPIDURAL; INFILTRATION; INTRACAUDAL; PERINEURAL ONCE
Status: COMPLETED | OUTPATIENT
Start: 2022-12-07 | End: 2022-12-07

## 2022-12-07 RX ORDER — ASPIRIN 81 MG/1
324 TABLET, CHEWABLE ORAL ONCE
Status: COMPLETED | OUTPATIENT
Start: 2022-12-07 | End: 2022-12-07

## 2022-12-07 RX ORDER — PREDNISONE 50 MG/1
50 TABLET ORAL DAILY
Qty: 5 TABLET | Refills: 0 | Status: SHIPPED | OUTPATIENT
Start: 2022-12-07 | End: 2022-12-12

## 2022-12-07 RX ORDER — ALBUTEROL SULFATE 90 UG/1
2 AEROSOL, METERED RESPIRATORY (INHALATION) EVERY 4 HOURS PRN
Qty: 1 G | Refills: 0 | Status: SHIPPED | OUTPATIENT
Start: 2022-12-07 | End: 2023-01-06

## 2022-12-07 RX ORDER — SODIUM CHLORIDE 0.9 % (FLUSH) 0.9 %
10 SYRINGE (ML) INJECTION AS NEEDED
Status: DISCONTINUED | OUTPATIENT
Start: 2022-12-07 | End: 2022-12-07 | Stop reason: HOSPADM

## 2022-12-07 RX ORDER — METHYLPREDNISOLONE SODIUM SUCCINATE 125 MG/2ML
125 INJECTION, POWDER, LYOPHILIZED, FOR SOLUTION INTRAMUSCULAR; INTRAVENOUS ONCE
Status: COMPLETED | OUTPATIENT
Start: 2022-12-07 | End: 2022-12-07

## 2022-12-07 RX ORDER — IPRATROPIUM BROMIDE AND ALBUTEROL SULFATE 2.5; .5 MG/3ML; MG/3ML
3 SOLUTION RESPIRATORY (INHALATION)
Status: COMPLETED | OUTPATIENT
Start: 2022-12-07 | End: 2022-12-07

## 2022-12-07 RX ADMIN — IPRATROPIUM BROMIDE AND ALBUTEROL SULFATE 3 ML: .5; 3 SOLUTION RESPIRATORY (INHALATION) at 19:22

## 2022-12-07 RX ADMIN — ASPIRIN 81 MG CHEWABLE TABLET 324 MG: 81 TABLET CHEWABLE at 15:38

## 2022-12-07 RX ADMIN — IPRATROPIUM BROMIDE AND ALBUTEROL SULFATE 3 ML: .5; 3 SOLUTION RESPIRATORY (INHALATION) at 19:21

## 2022-12-07 RX ADMIN — LIDOCAINE HYDROCHLORIDE 5 ML: 10 INJECTION, SOLUTION EPIDURAL; INFILTRATION; INTRACAUDAL; PERINEURAL at 19:25

## 2022-12-07 RX ADMIN — METHYLPREDNISOLONE SODIUM SUCCINATE 125 MG: 125 INJECTION, POWDER, FOR SOLUTION INTRAMUSCULAR; INTRAVENOUS at 19:29

## 2022-12-07 RX ADMIN — IPRATROPIUM BROMIDE AND ALBUTEROL SULFATE 3 ML: .5; 3 SOLUTION RESPIRATORY (INHALATION) at 19:20

## 2022-12-07 NOTE — TELEPHONE ENCOUNTER
Caller: Bhargavi Jeronimo    Relationship to patient: Self    Best call back number: [phone]     Chief complaint: TIGHTNESS IN CHEST AND CHEST PAIN    Patient directed to call 911 or go to their nearest emergency room.     Patient verbalized understanding: [x] Yes  [] No  If no, why?    Additional notes:

## 2022-12-07 NOTE — ED PROVIDER NOTES
Time: 2:47 PM EST  Chief Complaint:   Chief Complaint   Patient presents with   • Chest Pain   • Shortness of Breath           History of Present Illness:    Patient's chief complaint is chest tightness. The pain started on Tuesday. She indicates the pain is across the central superior aspect of the chest. No radiation of the pain. She denies nausea or vomiting. She has had diarrhea, but this has improved today. She endorses mild shortness of breath which is worse with exertion, but also present at rest. She also has a dry cough which started on Sunday. She endorses fever and chills, but no muscle aches. No swelling in the legs. Patient denies pregnancy. She has had a hysterectomy. She was diagnosed with flu on Monday and was placed on Tamiflu and Tessalon Perles on Monday by her PCP. She is an active smoker, No known family history of CAD. No known history of DVT or PE. No history of COPD or asthma. No recent surgery or plane flight. Patient does not receive estrogen. No active cancer.              Patient Care Team  Primary Care Provider: Maureen Plummer APRN    Past Medical History:     Allergies   Allergen Reactions   • Latex Hives     Tree nuts and peanuts   • Nuts Anaphylaxis     Past Medical History:   Diagnosis Date   • Allergic    • Anemia    • Anxiety    • Arthritis    • Asthma    • Cataract    • Depression    • Intermediate uveitis    • Migraine    • sinus trouble      Past Surgical History:   Procedure Laterality Date   • CHOLECYSTECTOMY  2006   • CYSTOSCOPY N/A 6/8/2021    Procedure: CYSTOSCOPY;  Surgeon: Cliff Pillai MD;  Location: [facility];  Service: Obstetrics/Gynecology;  Laterality: N/A;   • DIAGNOSTIC LAPAROSCOPY TOTAL ABDOMINAL HYSTERECTOMY N/A 6/8/2021    Procedure: DIAGNOSTIC LAPAROSCOPY TOTAL ABDOMINAL HYSTERECTOMY, BILATERAL SALPINGECTOMY;  Surgeon: Cliff Pillai MD;  Location: [facility];  Service: Obstetrics/Gynecology;  Laterality: N/A;   •  LAPAROSCOPIC LYSIS OF ADHESIONS N/A 6/30/2021    Procedure: LAPAROSCOPIC LYSIS OF ADHESIONS;  Surgeon: Cliff Pillai MD;  Location: [facility];  Service: Gynecology;  Laterality: N/A;   • OOPHORECTOMY Right 6/30/2021    Procedure: OOPHORECTOMY;  Surgeon: Cliff Pillai MD;  Location: [facility];  Service: Obstetrics/Gynecology;  Laterality: Right;   • ORIF ANKLE FRACTURE Left      Family History   Problem Relation Age of Onset   • Other Mother    • Fibroids Mother    • Cancer Maternal Aunt    • Cancer Maternal Grandmother    • Diabetes Paternal Grandfather    • Cancer Paternal Grandfather    • Malig Hyperthermia Neg Hx        Home Medications:  Prior to Admission medications    Medication Sig Start Date End Date Taking? Authorizing Provider   Adalimumab (Humira) 40 MG/0.4ML Prefilled Syringe Kit injection Inject 40 mg under the skin into the appropriate area as directed Every 10 (Ten) Days.    Pa Morrell MD   albuterol sulfate  (90 Base) MCG/ACT inhaler Inhale 2 puffs See Admin Instructions. inhale 2 puffs by mouth every 4 to 6 hours as needed 1/31/22   Pa Morrell MD   amLODIPine (NORVASC) 2.5 MG tablet TAKE 1 TABLET BY MOUTH ONCE DAILY FOR BLOOD PRESSURE 7/27/22   Maureen Plummer APRN   azithromycin (Zithromax Z-Brent) 250 MG tablet Take 2 tablets by mouth on day 1, then 1 tablet daily on days 2-5 7/14/22   Liyah Torres APRN   buPROPion (WELLBUTRIN) 100 MG tablet Take 150 mg by mouth Daily.    Pa Morrell MD   buPROPion XL (WELLBUTRIN XL) 150 MG 24 hr tablet  6/20/22   Pa Morrell MD   busPIRone (BUSPAR) 15 MG tablet Take 15 mg by mouth 3 (Three) Times a Day. Usually take at night x1    Pa Morrell MD   cetirizine (zyrTEC) 10 MG tablet Take 10 mg by mouth Daily.    Pa Morrell MD   fluticasone (FLONASE) 50 MCG/ACT nasal spray 2 sprays into the nostril(s) as directed by provider Daily.    Porsche  "MD Pa   ibuprofen (ADVIL,MOTRIN) 600 MG tablet Take 1 tablet by mouth Every 6 (Six) Hours As Needed for Mild Pain . 6/10/21   Cliff Pillai MD   lamoTRIgine (LaMICtal) 100 MG tablet Take 150 mg by mouth 2 (Two) Times a Day.    ProviderPa MD   meclizine (ANTIVERT) 25 MG tablet Take 1 tablet by mouth 3 (Three) Times a Day As Needed for Dizziness. 1/3/22   Maureen Plummer APRN   methylPREDNISolone (MEDROL) 4 MG dose pack Take as directed on package instructions. 7/14/22   Liyah Torres APRN   multivitamin with minerals tablet tablet Take 1 tablet by mouth Daily.    ProviderPa MD   traZODone (DESYREL) 100 MG tablet Take 150 mg by mouth Daily.    ProviderPa MD        Social History:   Social History     Tobacco Use   • Smoking status: Every Day     Packs/day: 0.50     Years: 20.00     Pack years: 10.00     Types: Cigarettes     Start date: 4/30/2002   • Smokeless tobacco: Never   Vaping Use   • Vaping Use: Never used   Substance Use Topics   • Alcohol use: Not Currently     Comment: SOCIAL   • Drug use: Never         Review of Systems:  Review of Systems   Constitutional: Positive for chills and fever.   HENT: Negative for congestion, ear pain and sore throat.    Eyes: Negative for pain.   Respiratory: Positive for cough, chest tightness and shortness of breath.         Dx flu on Monday   Cardiovascular: Negative for chest pain.   Gastrointestinal: Positive for diarrhea. Negative for abdominal pain, nausea and vomiting.   Genitourinary: Negative for flank pain and hematuria.   Musculoskeletal: Negative for joint swelling.   Skin: Negative for pallor.   Neurological: Negative for seizures and headaches.   All other systems reviewed and are negative.       Physical Exam:  /85   Pulse 82   Temp 97.3 °F (36.3 °C)   Resp 18   Ht 165.1 cm (65\")   Wt 77.8 kg (171 lb 8.3 oz)   LMP 05/20/2021 (Approximate)   SpO2 96%   BMI 28.54 kg/m²     Physical " Exam  Vitals and nursing note reviewed.   Constitutional:       General: She is not in acute distress.     Appearance: Normal appearance. She is not ill-appearing, toxic-appearing or diaphoretic.   HENT:      Head: Normocephalic and atraumatic.      Mouth/Throat:      Mouth: Mucous membranes are moist.   Eyes:      Pupils: Pupils are equal, round, and reactive to light.   Cardiovascular:      Rate and Rhythm: Normal rate and regular rhythm.      Pulses: Normal pulses.           Carotid pulses are 2+ on the right side and 2+ on the left side.       Radial pulses are 2+ on the right side and 2+ on the left side.        Femoral pulses are 2+ on the right side and 2+ on the left side.       Popliteal pulses are 2+ on the right side and 2+ on the left side.        Dorsalis pedis pulses are 2+ on the right side and 2+ on the left side.        Posterior tibial pulses are 2+ on the right side and 2+ on the left side.      Heart sounds: Normal heart sounds. No murmur heard.  Pulmonary:      Effort: Pulmonary effort is normal. No accessory muscle usage, respiratory distress or retractions.      Breath sounds: Examination of the right-upper field reveals decreased breath sounds. Examination of the left-upper field reveals decreased breath sounds. Examination of the right-middle field reveals decreased breath sounds. Examination of the left-middle field reveals decreased breath sounds. Examination of the right-lower field reveals decreased breath sounds. Examination of the left-lower field reveals decreased breath sounds. Decreased breath sounds present. No wheezing, rhonchi or rales.   Abdominal:      General: Abdomen is flat. There is no distension.      Palpations: Abdomen is soft. There is no mass.      Tenderness: There is no abdominal tenderness. There is no right CVA tenderness, left CVA tenderness, guarding or rebound.      Comments: No rigidity   Musculoskeletal:         General: No swelling, tenderness or deformity.       Cervical back: Neck supple. No tenderness.      Right lower leg: No edema.      Left lower leg: No edema.      Comments: No calf tenderness   Skin:     General: Skin is warm and dry.      Capillary Refill: Capillary refill takes less than 2 seconds.      Coloration: Skin is not jaundiced or pale.      Findings: No erythema.   Neurological:      General: No focal deficit present.      Mental Status: She is alert and oriented to person, place, and time. Mental status is at baseline.      Sensory: No sensory deficit.      Motor: No weakness.   Psychiatric:         Mood and Affect: Mood normal.         Behavior: Behavior normal.                Medications in the Emergency Department:  Medications   sodium chloride 0.9 % flush 10 mL (has no administration in time range)   aspirin chewable tablet 324 mg (324 mg Oral Given 12/7/22 1538)   ipratropium-albuterol (DUO-NEB) nebulizer solution 3 mL (3 mL Nebulization Given 12/7/22 1922)   lidocaine PF 1% (XYLOCAINE) injection 5 mL (5 mL Nebulization Given 12/7/22 1925)   methylPREDNISolone sodium succinate (SOLU-Medrol) injection 125 mg (125 mg Intravenous Given 12/7/22 1929)        Labs  Lab Results (last 24 hours)     Procedure Component Value Units Date/Time    POC Troponin I with Hold Tube [[id number]] Collected: 12/07/22 1245    Specimen: Blood Updated: 12/07/22 1323    Narrative:      The following orders were created for panel order POC Troponin I with Hold Tube.  Procedure                               Abnormality         Status                     ---------                               -----------         ------                     POC Troponin I[[id number]]                                                              HOLD Troponin-I Tube[[id number]]                             Final result                 Please view results for these tests on the individual orders.    CBC & Differential [[id number]]  (Abnormal) Collected: 12/07/22 1245    Specimen: Blood Updated:  12/07/22 1307    Narrative:      The following orders were created for panel order CBC & Differential.  Procedure                               Abnormality         Status                     ---------                               -----------         ------                     CBC Auto Differential[[id number]]        Abnormal            Final result                 Please view results for these tests on the individual orders.    Comprehensive Metabolic Panel [[id number]]  (Abnormal) Collected: 12/07/22 1245    Specimen: Blood Updated: 12/07/22 1329     Glucose 87 mg/dL      BUN 8 mg/dL      Creatinine 0.66 mg/dL      Sodium 135 mmol/L      Potassium 4.1 mmol/L      Chloride 98 mmol/L      CO2 26.7 mmol/L      Calcium 9.1 mg/dL      Total Protein 8.2 g/dL      Albumin 4.60 g/dL      ALT (SGPT) 18 U/L      AST (SGOT) 28 U/L      Alkaline Phosphatase 86 U/L      Total Bilirubin 0.2 mg/dL      Globulin 3.6 gm/dL      A/G Ratio 1.3 g/dL      BUN/Creatinine Ratio 12.1     Anion Gap 10.3 mmol/L      eGFR 114.6 mL/min/1.73      Comment: National Kidney Foundation and American Society of Nephrology (ASN) Task Force recommended calculation based on the Chronic Kidney Disease Epidemiology Collaboration (CKD-EPI) equation refit without adjustment for race.       Narrative:      GFR Normal >60  Chronic Kidney Disease <60  Kidney Failure <15      Lipase [[id number]]  (Normal) Collected: 12/07/22 1245    Specimen: Blood Updated: 12/07/22 1329     Lipase 36 U/L     BNP [[id number]]  (Normal) Collected: 12/07/22 1245    Specimen: Blood Updated: 12/07/22 1324     proBNP <36.0 pg/mL     Narrative:      Among patients with dyspnea, NT-proBNP is highly sensitive for the detection of acute congestive heart failure. In addition NT-proBNP of <300 pg/ml effectively rules out acute congestive heart failure with 99% negative predictive value.      Magnesium [[id number]]  (Normal) Collected: 12/07/22 1245    Specimen: Blood Updated: 12/07/22 1329      Magnesium 1.8 mg/dL     CBC Auto Differential [[id number]]  (Abnormal) Collected: 12/07/22 1245    Specimen: Blood Updated: 12/07/22 1307     WBC 13.77 10*3/mm3      RBC 4.71 10*6/mm3      Hemoglobin 14.1 g/dL      Hematocrit 41.4 %      MCV 87.9 fL      MCH 29.9 pg      MCHC 34.1 g/dL      RDW 13.6 %      RDW-SD 44.3 fl      MPV 9.2 fL      Platelets 297 10*3/mm3      Neutrophil % 55.5 %      Lymphocyte % 37.0 %      Monocyte % 6.0 %      Eosinophil % 0.9 %      Basophil % 0.4 %      Immature Grans % 0.2 %      Neutrophils, Absolute 7.66 10*3/mm3      Lymphocytes, Absolute 5.09 10*3/mm3      Monocytes, Absolute 0.82 10*3/mm3      Eosinophils, Absolute 0.12 10*3/mm3      Basophils, Absolute 0.05 10*3/mm3      Immature Grans, Absolute 0.03 10*3/mm3      nRBC 0.0 /100 WBC     POC Troponin I [[id number]]  (Normal) Collected: 12/07/22 1251    Specimen: Blood Updated: 12/07/22 1316     Troponin I 0.00 ng/mL      Comment: Serial Number: [id number]:  [id number]       POC Troponin I with Hold Tube [[id number]] Collected: 12/07/22 1545    Specimen: Blood Updated: 12/07/22 2036    Narrative:      The following orders were created for panel order POC Troponin I with Hold Tube.  Procedure                               Abnormality         Status                     ---------                               -----------         ------                     POC Troponin I[[id number]]                                                              HOLD Troponin-I Tube[[id number]]                                                          Please view results for these tests on the individual orders.    POC Troponin I [[id number]]  (Normal) Collected: 12/07/22 1603    Specimen: Blood Updated: 12/07/22 1615     Troponin I 0.00 ng/mL      Comment: Serial Number: [id number]:  [id number]              Imaging:  XR Chest 1 View    Result Date: 12/7/2022  PROCEDURE: XR CHEST 1 VW  COMPARISON: [facility], , CHEST AP/PA 1 VIEW, 1/29/2019,  12:49.  INDICATIONS: CHEST PAIN  FINDINGS:  The heart is normal in size.  The lungs are well-expanded and free of infiltrates.  Bony structures appear intact.       No active disease is seen.       TIM KAY MD       Electronically Signed and Approved By: TIM KAY MD on 12/07/2022 at 15:03               Procedures:  Procedures    Progress  ED Course as of 12/07/22 2104   Wed Dec 07, 2022   [id number]   --- PROVIDER IN TRIAGE NOTE ---    Patient was seen and evaluated in triage by ZEKE Gatica.  Orders were written and the patient is currently awaiting disposition.   [MS]      ED Course User Index  [MS] Yudy Kaiser APRN                            The patient was initially evaluated in the triage area where orders were placed. The patient was later dispositioned by Ihsan Smith DO.      The patient was advised to stay for completion of workup which includes but is not limited to communication of labs and radiological results, reassessment and plan. The patient was advised that leaving prior to disposition by a provider could result in critical findings that are not communicated to the patient.     Medical Decision Making:  MDM  Number of Diagnoses or Management Options  Acute bronchitis, unspecified organism  Influenza  Diagnosis management comments:     PERC score: 0    The patient's PERC score was negative.  I have discussed with the patient that they have a very low risk for pulmonary embolism.  I have discussed possibly performing a CAT scan of the chest with IV contrast to rule out pulmonary embolism.  However, due to the fact that the patient is very low risk for pulmonary embolism, they would prefer not to have a CAT scan of the chest at this time due to radiation exposure.    Heart score: 1    The patient had 2 troponins that were within normal limits.  The patient had 2 EKGs that demonstrated no evidence of ST segment elevation MI or other injury pattern    The patient appears  well, in no distress and nontoxic.  The patient is resting comfortably.  The patient has a low heart score.  I have discussed the significance of the heart score with them.  The patient understands that they have a low risk for cardiovascular event over the next 6 weeks.  Understanding the risks, they feel comfortable to be discharged home and to follow-up with their primary care physician discuss possible need for stress test on outpatient basis..   In the interim, the patient does understand should they develop worsening chest pain, near syncope, syncope, extreme fatigue, worsening shortness of breath or diaphoresis to return back to emergency room immediately.    The patient did have a recent diagnosis of flu.  The patient's chief complaint was chest tightness.  The patient was treated with Solu-Medrol and breathing treatments.  The patient was reassessed and states that her tightness is significant improved.  The patient is in no respiratory distress including no tachypnea, no accessory muscle use or intercostal retractions.  The patient has a normal room air pulse ox.  The patient will continue her Tamiflu and Tessalon Perles.  The patient will be placed on prednisone and albuterol.       Amount and/or Complexity of Data Reviewed  Clinical lab tests: reviewed  Tests in the radiology section of CPT®: reviewed  Tests in the medicine section of CPT®: reviewed             The following orders were placed after triage and evaluation:  Orders Placed This Encounter   Procedures   • XR Chest 1 View   • Leonard Draw   • Comprehensive Metabolic Panel   • Lipase   • BNP   • Magnesium   • CBC Auto Differential   • NPO Diet NPO Type: Strict NPO   • Undress & Gown   • Cardiac Monitoring   • Continuous Pulse Oximetry   • Oxygen Therapy- Nasal Cannula; 2 LPM; Titrate for SPO2: equal to or greater than, 92%   • POC Troponin I   • POC Troponin I   • POC Troponin I   • POC Troponin I   • ECG 12 Lead ED Triage Standing Order;  Chest Pain   • ECG 12 Lead ED Triage Standing Order; Chest Pain   • Insert Peripheral IV   • POC Troponin I with Hold Tube   • CBC & Differential   • Green Top (Gel)   • Lavender Top   • Gold Top - SST   • Light Blue Top   • HOLD Troponin-I Tube       Final diagnoses:   Influenza   Acute bronchitis, unspecified organism          Disposition:  ED Disposition     ED Disposition   Discharge    Condition   Stable    Comment   --             This medical record created using voice recognition software.           Cinda Scott  12/07/22 1913       Cinda Scott  12/07/22 1920       Cinda Scott  12/07/22 1950       Ihsan Smith DO  12/09/22 0433

## 2022-12-08 NOTE — DISCHARGE INSTRUCTIONS
Please use the albuterol inhaler every 4-6 hours as needed for shortness of breath    Please continue your Tamiflu and Tessalon Perles as previously prescribed    No strenuous activity until released by your doctor.  Please return to the emergency room for worsening chest pain, radiating chest pain, worsening shortness of breath, near passing out, passing out, extreme fatigue, extreme sweating, nausea or vomiting or new or worrisome symptoms

## 2023-03-03 RX ORDER — AMLODIPINE BESYLATE 2.5 MG/1
TABLET ORAL
Qty: 30 TABLET | Refills: 3 | Status: SHIPPED | OUTPATIENT
Start: 2023-03-03

## 2023-03-20 ENCOUNTER — OFFICE VISIT (OUTPATIENT)
Dept: FAMILY MEDICINE CLINIC | Facility: CLINIC | Age: 38
End: 2023-03-20
Payer: COMMERCIAL

## 2023-03-20 ENCOUNTER — LAB (OUTPATIENT)
Dept: LAB | Facility: HOSPITAL | Age: 38
End: 2023-03-20
Payer: COMMERCIAL

## 2023-03-20 VITALS
DIASTOLIC BLOOD PRESSURE: 76 MMHG | BODY MASS INDEX: 28.57 KG/M2 | SYSTOLIC BLOOD PRESSURE: 133 MMHG | HEART RATE: 78 BPM | OXYGEN SATURATION: 99 % | TEMPERATURE: 98.6 F | WEIGHT: 171.5 LBS | HEIGHT: 65 IN

## 2023-03-20 DIAGNOSIS — Z13.220 SCREENING FOR LIPID DISORDERS: ICD-10-CM

## 2023-03-20 DIAGNOSIS — Z20.2 EXPOSURE TO STD: ICD-10-CM

## 2023-03-20 DIAGNOSIS — F41.1 GAD (GENERALIZED ANXIETY DISORDER): ICD-10-CM

## 2023-03-20 DIAGNOSIS — F31.9 BIPOLAR AFFECTIVE DISORDER, REMISSION STATUS UNSPECIFIED: ICD-10-CM

## 2023-03-20 DIAGNOSIS — I10 ESSENTIAL HYPERTENSION: ICD-10-CM

## 2023-03-20 DIAGNOSIS — F32.A DEPRESSION, UNSPECIFIED DEPRESSION TYPE: ICD-10-CM

## 2023-03-20 DIAGNOSIS — Z13.29 SCREENING FOR THYROID DISORDER: ICD-10-CM

## 2023-03-20 DIAGNOSIS — Z01.419 WELL WOMAN EXAM: Primary | ICD-10-CM

## 2023-03-20 DIAGNOSIS — Z12.4 SCREENING FOR CERVICAL CANCER: ICD-10-CM

## 2023-03-20 LAB
C TRACH RRNA CVX QL NAA+PROBE: NOT DETECTED
CANDIDA SPECIES: NEGATIVE
CHOLEST SERPL-MCNC: 168 MG/DL (ref 0–200)
GARDNERELLA VAGINALIS: POSITIVE
HAV IGM SERPL QL IA: NORMAL
HBV CORE IGM SERPL QL IA: NORMAL
HBV SURFACE AG SERPL QL IA: NORMAL
HCV AB SER DONR QL: NORMAL
HDLC SERPL-MCNC: 62 MG/DL (ref 40–60)
HIV1+2 AB SER QL: NORMAL
LDLC SERPL CALC-MCNC: 96 MG/DL (ref 0–100)
LDLC/HDLC SERPL: 1.55 {RATIO}
N GONORRHOEA RRNA SPEC QL NAA+PROBE: NOT DETECTED
T VAGINALIS DNA VAG QL PROBE+SIG AMP: NEGATIVE
TRIGL SERPL-MCNC: 48 MG/DL (ref 0–150)
TSH SERPL DL<=0.05 MIU/L-ACNC: 0.64 UIU/ML (ref 0.27–4.2)
VLDLC SERPL-MCNC: 10 MG/DL (ref 5–40)

## 2023-03-20 PROCEDURE — G0432 EIA HIV-1/HIV-2 SCREEN: HCPCS

## 2023-03-20 PROCEDURE — G0123 SCREEN CERV/VAG THIN LAYER: HCPCS | Performed by: NURSE PRACTITIONER

## 2023-03-20 PROCEDURE — 86592 SYPHILIS TEST NON-TREP QUAL: CPT

## 2023-03-20 PROCEDURE — 36415 COLL VENOUS BLD VENIPUNCTURE: CPT

## 2023-03-20 PROCEDURE — 86695 HERPES SIMPLEX TYPE 1 TEST: CPT

## 2023-03-20 PROCEDURE — 80061 LIPID PANEL: CPT

## 2023-03-20 PROCEDURE — 87510 GARDNER VAG DNA DIR PROBE: CPT | Performed by: NURSE PRACTITIONER

## 2023-03-20 PROCEDURE — 87660 TRICHOMONAS VAGIN DIR PROBE: CPT | Performed by: NURSE PRACTITIONER

## 2023-03-20 PROCEDURE — 80074 ACUTE HEPATITIS PANEL: CPT

## 2023-03-20 PROCEDURE — 87480 CANDIDA DNA DIR PROBE: CPT | Performed by: NURSE PRACTITIONER

## 2023-03-20 PROCEDURE — 84443 ASSAY THYROID STIM HORMONE: CPT

## 2023-03-20 PROCEDURE — 87491 CHLMYD TRACH DNA AMP PROBE: CPT | Performed by: NURSE PRACTITIONER

## 2023-03-20 PROCEDURE — 86696 HERPES SIMPLEX TYPE 2 TEST: CPT

## 2023-03-20 PROCEDURE — 87591 N.GONORRHOEAE DNA AMP PROB: CPT | Performed by: NURSE PRACTITIONER

## 2023-03-20 PROCEDURE — 99214 OFFICE O/P EST MOD 30 MIN: CPT | Performed by: NURSE PRACTITIONER

## 2023-03-20 RX ORDER — BUPROPION HYDROCHLORIDE 300 MG/1
TABLET ORAL
COMMUNITY
Start: 2023-02-23

## 2023-03-20 RX ORDER — TRAZODONE HYDROCHLORIDE 150 MG/1
TABLET ORAL
COMMUNITY
Start: 2023-02-23

## 2023-03-20 RX ORDER — PREDNISOLONE ACETATE 10 MG/ML
SUSPENSION/ DROPS OPHTHALMIC
COMMUNITY
Start: 2023-03-08

## 2023-03-20 RX ORDER — BRIMONIDINE TARTRATE 2 MG/ML
1 SOLUTION/ DROPS OPHTHALMIC 2 TIMES DAILY
COMMUNITY
Start: 2023-03-09

## 2023-03-20 NOTE — PROGRESS NOTES
"Chief Complaint  Gynecologic Exam (Pt states she thinks her last pap was 2019 at dr enriquez's office.)    Subjective        Bhargavi Jeronimo presents to [facility]  History of Present Illness  Pt presents for well woman exam. Pt with hysterectomy in 2021 d/t fibroids. L ovary remains. Denies any recent menstrual bleeding. Pt would like to be tested for all STDs d/t possible exposure. Denies rash, lesions, fever, chills, discharge or other.     Reviewed all recent labs and medications.      Objective   Vital Signs:  /76   Pulse 78   Temp 98.6 °F (37 °C) (Temporal)   Ht 165.1 cm (65\")   Wt 77.8 kg (171 lb 8 oz)   SpO2 99%   BMI 28.54 kg/m²   Estimated body mass index is 28.54 kg/m² as calculated from the following:    Height as of this encounter: 165.1 cm (65\").    Weight as of this encounter: 77.8 kg (171 lb 8 oz).       BMI is >= 25 and <30. (Overweight) The following options were offered after discussion;: exercise counseling/recommendations and nutrition counseling/recommendations      Physical Exam  Vitals reviewed.   Constitutional:       General: She is not in acute distress.  HENT:      Head: Normocephalic and atraumatic.      Right Ear: Tympanic membrane normal.      Left Ear: Tympanic membrane normal.      Nose: Nose normal.      Mouth/Throat:      Pharynx: Oropharynx is clear. No posterior oropharyngeal erythema.   Eyes:      General: No scleral icterus.     Extraocular Movements: Extraocular movements intact.      Conjunctiva/sclera: Conjunctivae normal.      Pupils: Pupils are equal, round, and reactive to light.   Cardiovascular:      Rate and Rhythm: Normal rate and regular rhythm.      Pulses: Normal pulses.      Heart sounds: Normal heart sounds.   Pulmonary:      Effort: Pulmonary effort is normal.      Breath sounds: Normal breath sounds.   Chest:   Breasts:     Right: Normal.      Left: Normal.   Abdominal:      General: Bowel sounds are normal.      " Palpations: Abdomen is soft.      Hernia: There is no hernia in the left inguinal area or right inguinal area.   Genitourinary:     General: Normal vulva.      Pubic Area: No rash.       Labia:         Right: No rash, tenderness or lesion.         Left: No rash, tenderness or lesion.       Urethra: No prolapse, urethral pain, urethral swelling or urethral lesion.      Vagina: Normal. No vaginal discharge or lesions.      Uterus: Absent.       Rectum: No mass or external hemorrhoid.   Musculoskeletal:         General: Normal range of motion.      Cervical back: Neck supple.   Skin:     General: Skin is warm and dry.   Neurological:      Mental Status: She is alert and oriented to person, place, and time.   Psychiatric:         Mood and Affect: Mood normal.         Behavior: Behavior normal.         Thought Content: Thought content normal.         Judgment: Judgment normal.        Result Review :    Common labs    Common Labs 12/7/22 12/7/22    1245 1245   Glucose  87   BUN  8   Creatinine  0.66   Sodium  135 (A)   Potassium  4.1   Chloride  98   Calcium  9.1   Albumin  4.60   Total Bilirubin  0.2   Alkaline Phosphatase  86   AST (SGOT)  28   ALT (SGPT)  18   WBC 13.77 (A)    Hemoglobin 14.1    Hematocrit 41.4    Platelets 297    (A) Abnormal value            Data reviewed: Consultant notes GYN              Assessment and Plan   Diagnoses and all orders for this visit:    1. Well woman exam (Primary)  Assessment & Plan:  Discussed age appropriate preventative counseling. Written information provided to patient. All questions answered. Pt verbalized understanding.   Advised will be due for mammo soon. States she will order next visit.       2. Exposure to STD  Comments:  order for STD  testing   condom use during every sexual encounter to protect against STDs  Orders:  -     Hepatitis panel, acute; Future  -     HIV-1/O/2 ANTIGEN/ANTIBODY, 4TH GENERATION; Future  -     HSV 1 and 2-Specific Ab, IgG; Future  -     RPR;  Future  -     Chlamydia trachomatis, Neisseria gonorrhoeae, PCR - Swab, Vagina; Future  -     Gardnerella vaginalis, Trichomonas vaginalis, Candida albicans, DNA - Swab, Vagina    3. Screening for thyroid disorder  -     TSH; Future    4. Screening for lipid disorders  -     Lipid Panel; Future    5. Screening for cervical cancer  -     IGP, Rfx Aptima HPV ASCU; Future    6. Depression, unspecified depression type    7. Essential hypertension  Assessment & Plan:  Hypertension is improving with treatment.  Continue current treatment regimen.  Dietary sodium restriction.  Weight loss.  Regular aerobic exercise.  Blood pressure will be reassessed at the next regular appointment.      8. Bipolar affective disorder, remission status unspecified (HCC)  Assessment & Plan:  Psychological condition is improving with treatment.  Continue current treatment regimen.  Regular aerobic exercise.  Psychological condition  will be reassessed at the next regular appointment.      9. SHASHI (generalized anxiety disorder)  Assessment & Plan:  Psychological condition is improving with treatment.  Continue current treatment regimen.  Regular aerobic exercise.  Psychological condition  will be reassessed at the next regular appointment.             Follow Up   Return if symptoms worsen or fail to improve.  Patient was given instructions and counseling regarding her condition or for health maintenance advice. Please see specific information pulled into the AVS if appropriate.

## 2023-03-20 NOTE — ASSESSMENT & PLAN NOTE
Discussed age appropriate preventative counseling. Written information provided to patient. All questions answered. Pt verbalized understanding.   Advised will be due for mammo soon. States she will order next visit.

## 2023-03-22 LAB
HSV1 IGG SER IA-ACNC: 23.1 INDEX (ref 0–0.9)
HSV2 IGG SER IA-ACNC: <0.91 INDEX (ref 0–0.9)
RPR SER QL: NON REACTIVE

## 2023-03-22 RX ORDER — METRONIDAZOLE 500 MG/1
500 TABLET ORAL 2 TIMES DAILY
Qty: 14 TABLET | Refills: 0 | Status: SHIPPED | OUTPATIENT
Start: 2023-03-22 | End: 2023-03-29

## 2023-03-27 LAB
CONV .: NORMAL
CYTOLOGIST CVX/VAG CYTO: NORMAL
CYTOLOGY CVX/VAG DOC CYTO: NORMAL
CYTOLOGY CVX/VAG DOC THIN PREP: NORMAL
DX ICD CODE: NORMAL
HIV 1 & 2 AB SER-IMP: NORMAL
OTHER STN SPEC: NORMAL
STAT OF ADQ CVX/VAG CYTO-IMP: NORMAL

## 2023-08-22 ENCOUNTER — OFFICE VISIT (OUTPATIENT)
Dept: FAMILY MEDICINE CLINIC | Facility: CLINIC | Age: 38
End: 2023-08-22
Payer: COMMERCIAL

## 2023-08-22 VITALS
OXYGEN SATURATION: 99 % | BODY MASS INDEX: 27.99 KG/M2 | HEART RATE: 79 BPM | DIASTOLIC BLOOD PRESSURE: 91 MMHG | WEIGHT: 168 LBS | SYSTOLIC BLOOD PRESSURE: 154 MMHG | TEMPERATURE: 98.6 F | HEIGHT: 65 IN

## 2023-08-22 DIAGNOSIS — R52 GENERALIZED BODY ACHES: ICD-10-CM

## 2023-08-22 DIAGNOSIS — I10 ESSENTIAL HYPERTENSION: ICD-10-CM

## 2023-08-22 DIAGNOSIS — F17.210 CIGARETTE NICOTINE DEPENDENCE WITHOUT COMPLICATION: ICD-10-CM

## 2023-08-22 DIAGNOSIS — F31.9 BIPOLAR AFFECTIVE DISORDER, REMISSION STATUS UNSPECIFIED: ICD-10-CM

## 2023-08-22 DIAGNOSIS — B34.9 VIRAL ILLNESS: ICD-10-CM

## 2023-08-22 DIAGNOSIS — R51.9 NONINTRACTABLE HEADACHE, UNSPECIFIED CHRONICITY PATTERN, UNSPECIFIED HEADACHE TYPE: Primary | ICD-10-CM

## 2023-08-22 DIAGNOSIS — F41.1 GAD (GENERALIZED ANXIETY DISORDER): ICD-10-CM

## 2023-08-22 LAB
EXPIRATION DATE: NORMAL
EXPIRATION DATE: NORMAL
FLUAV AG NPH QL: NEGATIVE
FLUBV AG NPH QL: NEGATIVE
INTERNAL CONTROL: NORMAL
INTERNAL CONTROL: NORMAL
Lab: NORMAL
Lab: NORMAL
SARS-COV-2 AG UPPER RESP QL IA.RAPID: NOT DETECTED

## 2023-08-22 PROCEDURE — 99214 OFFICE O/P EST MOD 30 MIN: CPT | Performed by: NURSE PRACTITIONER

## 2023-08-22 PROCEDURE — 87426 SARSCOV CORONAVIRUS AG IA: CPT | Performed by: NURSE PRACTITIONER

## 2023-08-22 PROCEDURE — 87804 INFLUENZA ASSAY W/OPTIC: CPT | Performed by: NURSE PRACTITIONER

## 2023-08-22 RX ORDER — METHYLPREDNISOLONE 4 MG/1
TABLET ORAL
Qty: 21 TABLET | Refills: 0 | Status: SHIPPED | OUTPATIENT
Start: 2023-08-22

## 2023-08-22 NOTE — PROGRESS NOTES
"Chief Complaint  Fatigue (Symptoms started lastnight.), Headache, and Generalized Body Aches    Subjective        Bhargavi Jeronimo presents to [facility]  History of Present Illness  Pt presents c/o fatigue, HA, myalgia x 2 days. Took ibu to treat which did not help. Denies fever, chills, SOB, chest pain, N/V/D or other. No known sick contacts, however did recently go out to celebrate her birthday.     Reviewed all recent labs and medications.    Objective   Vital Signs:  /91   Pulse 79   Temp 98.6 øF (37 øC) (Temporal)   Ht 165.1 cm (65\")   Wt 76.2 kg (168 lb)   SpO2 99%   BMI 27.96 kg/mý   Estimated body mass index is 27.96 kg/mý as calculated from the following:    Height as of this encounter: 165.1 cm (65\").    Weight as of this encounter: 76.2 kg (168 lb).               Physical Exam  Vitals reviewed.   Constitutional:       General: She is not in acute distress.  HENT:      Head: Normocephalic.      Right Ear: Tympanic membrane normal.      Left Ear: Tympanic membrane normal.      Nose: Nose normal.      Mouth/Throat:      Pharynx: Oropharynx is clear. Posterior oropharyngeal erythema present.   Eyes:      General: No scleral icterus.     Extraocular Movements: Extraocular movements intact.      Conjunctiva/sclera: Conjunctivae normal.      Pupils: Pupils are equal, round, and reactive to light.   Cardiovascular:      Rate and Rhythm: Normal rate and regular rhythm.      Pulses: Normal pulses.      Heart sounds: Normal heart sounds.   Pulmonary:      Effort: Pulmonary effort is normal.      Breath sounds: Normal breath sounds.   Abdominal:      General: Bowel sounds are normal.      Palpations: Abdomen is soft.   Musculoskeletal:         General: Normal range of motion.      Cervical back: Neck supple.   Skin:     General: Skin is warm and dry.   Neurological:      Mental Status: She is alert and oriented to person, place, and time.   Psychiatric:         Mood " and Affect: Mood normal.         Behavior: Behavior normal.         Thought Content: Thought content normal.         Judgment: Judgment normal.      Result Review :    Common labs          12/7/2022    12:45 3/20/2023    14:38   Common Labs   Glucose 87     BUN 8     Creatinine 0.66     Sodium 135     Potassium 4.1     Chloride 98     Calcium 9.1     Albumin 4.60     Total Bilirubin 0.2     Alkaline Phosphatase 86     AST (SGOT) 28     ALT (SGPT) 18     WBC 13.77     Hemoglobin 14.1     Hematocrit 41.4     Platelets 297     Total Cholesterol  168    Triglycerides  48    HDL Cholesterol  62    LDL Cholesterol   96                   Assessment and Plan   Diagnoses and all orders for this visit:    1. Nonintractable headache, unspecified chronicity pattern, unspecified headache type (Primary)  Comments:  Tyl/Ibu UAD prn pain  Orders:  -     POCT SARS-CoV-2 Antigen MECHELLE  -     POCT Influenza A/B    2. Generalized body aches  -     POCT SARS-CoV-2 Antigen MECHELLE  -     POCT Influenza A/B    3. Essential hypertension  Assessment & Plan:  Hypertension is elevated in office today, pt states she has not taken her medication x 2 days.  Continue current treatment regimen.  Dietary sodium restriction.  Weight loss.  Regular aerobic exercise.  Blood pressure will be reassessed at the next regular appointment.      4. SHASHI (generalized anxiety disorder)  Assessment & Plan:  Psychological condition is improving with treatment.  Continue current treatment regimen.  Regular aerobic exercise.  Psychological condition  will be reassessed at the next regular appointment.         5. Bipolar affective disorder, remission status unspecified  Assessment & Plan:  Psychological condition is improving with treatment.  Continue current treatment regimen.  Regular aerobic exercise.  Psychological condition  will be reassessed at the next regular appointment.      6. Cigarette nicotine dependence without complication  Assessment & Plan:  Tobacco use  is unchanged.  Smoking cessation counseling was provided.  Pharmacotherapy was prescribed as ordered.  Tobacco use will be reassessed at the next regular appointment.      7. Viral illness  Comments:  medrol dose rigo   increase rest/clear fluids   work note given for today and tomorrow    Other orders  -     methylPREDNISolone (MEDROL) 4 MG dose pack; Take as directed on package instructions.  Dispense: 21 tablet; Refill: 0             Follow Up   Return if symptoms worsen or fail to improve.  Patient was given instructions and counseling regarding her condition or for health maintenance advice. Please see specific information pulled into the AVS if appropriate.

## 2023-08-22 NOTE — ASSESSMENT & PLAN NOTE
Hypertension is elevated in office today, pt states she has not taken her medication x 2 days.  Continue current treatment regimen.  Dietary sodium restriction.  Weight loss.  Regular aerobic exercise.  Blood pressure will be reassessed at the next regular appointment.

## 2023-09-25 ENCOUNTER — APPOINTMENT (OUTPATIENT)
Dept: GENERAL RADIOLOGY | Facility: HOSPITAL | Age: 38
End: 2023-09-25
Payer: COMMERCIAL

## 2023-09-25 ENCOUNTER — HOSPITAL ENCOUNTER (EMERGENCY)
Facility: HOSPITAL | Age: 38
Discharge: HOME | End: 2023-09-25
Attending: EMERGENCY MEDICINE | Admitting: EMERGENCY MEDICINE
Payer: COMMERCIAL

## 2023-09-25 VITALS
WEIGHT: 165.57 LBS | SYSTOLIC BLOOD PRESSURE: 140 MMHG | HEIGHT: 65 IN | DIASTOLIC BLOOD PRESSURE: 85 MMHG | HEART RATE: 70 BPM | RESPIRATION RATE: 20 BRPM | BODY MASS INDEX: 27.58 KG/M2 | OXYGEN SATURATION: 100 % | TEMPERATURE: 98.4 F

## 2023-09-25 DIAGNOSIS — R07.81 PLEURITIC CHEST PAIN: Primary | ICD-10-CM

## 2023-09-25 LAB
ALBUMIN SERPL-MCNC: 4.6 G/DL (ref 3.5–5.2)
ALBUMIN/GLOB SERPL: 1.4 G/DL
ALP SERPL-CCNC: 72 U/L (ref 39–117)
ALT SERPL W P-5'-P-CCNC: 11 U/L (ref 1–33)
ANION GAP SERPL CALCULATED.3IONS-SCNC: 12.7 MMOL/L (ref 5–15)
AST SERPL-CCNC: 20 U/L (ref 1–32)
BASOPHILS # BLD AUTO: 0.05 10*3/MM3 (ref 0–0.2)
BASOPHILS NFR BLD AUTO: 0.4 % (ref 0–1.5)
BILIRUB SERPL-MCNC: 0.3 MG/DL (ref 0–1.2)
BUN SERPL-MCNC: 7 MG/DL (ref 6–20)
BUN/CREAT SERPL: 8.6 (ref 7–25)
CALCIUM SPEC-SCNC: 9.5 MG/DL (ref 8.6–10.5)
CHLORIDE SERPL-SCNC: 98 MMOL/L (ref 98–107)
CO2 SERPL-SCNC: 24.3 MMOL/L (ref 22–29)
CREAT SERPL-MCNC: 0.81 MG/DL (ref 0.57–1)
DEPRECATED RDW RBC AUTO: 46.6 FL (ref 37–54)
EGFRCR SERPLBLD CKD-EPI 2021: 94.2 ML/MIN/1.73
EOSINOPHIL # BLD AUTO: 0.57 10*3/MM3 (ref 0–0.4)
EOSINOPHIL NFR BLD AUTO: 4.9 % (ref 0.3–6.2)
ERYTHROCYTE [DISTWIDTH] IN BLOOD BY AUTOMATED COUNT: 14 % (ref 12.3–15.4)
FLUAV AG NPH QL: NEGATIVE
FLUBV AG NPH QL IA: NEGATIVE
GLOBULIN UR ELPH-MCNC: 3.2 GM/DL
GLUCOSE SERPL-MCNC: 109 MG/DL (ref 65–99)
HCT VFR BLD AUTO: 39.6 % (ref 34–46.6)
HGB BLD-MCNC: 13 G/DL (ref 12–15.9)
HOLD SPECIMEN: NORMAL
HOLD SPECIMEN: NORMAL
IMM GRANULOCYTES # BLD AUTO: 0.03 10*3/MM3 (ref 0–0.05)
IMM GRANULOCYTES NFR BLD AUTO: 0.3 % (ref 0–0.5)
LIPASE SERPL-CCNC: 34 U/L (ref 13–60)
LYMPHOCYTES # BLD AUTO: 3.64 10*3/MM3 (ref 0.7–3.1)
LYMPHOCYTES NFR BLD AUTO: 31.5 % (ref 19.6–45.3)
MAGNESIUM SERPL-MCNC: 1.9 MG/DL (ref 1.6–2.6)
MCH RBC QN AUTO: 29.5 PG (ref 26.6–33)
MCHC RBC AUTO-ENTMCNC: 32.8 G/DL (ref 31.5–35.7)
MCV RBC AUTO: 90 FL (ref 79–97)
MONOCYTES # BLD AUTO: 0.77 10*3/MM3 (ref 0.1–0.9)
MONOCYTES NFR BLD AUTO: 6.7 % (ref 5–12)
NEUTROPHILS NFR BLD AUTO: 56.2 % (ref 42.7–76)
NEUTROPHILS NFR BLD AUTO: 6.48 10*3/MM3 (ref 1.7–7)
NRBC BLD AUTO-RTO: 0 /100 WBC (ref 0–0.2)
NT-PROBNP SERPL-MCNC: <36 PG/ML (ref 0–450)
PLATELET # BLD AUTO: 287 10*3/MM3 (ref 140–450)
PMV BLD AUTO: 9.4 FL (ref 6–12)
POTASSIUM SERPL-SCNC: 3.7 MMOL/L (ref 3.5–5.2)
PROT SERPL-MCNC: 7.8 G/DL (ref 6–8.5)
RBC # BLD AUTO: 4.4 10*6/MM3 (ref 3.77–5.28)
SARS-COV-2 RNA RESP QL NAA+PROBE: NOT DETECTED
SODIUM SERPL-SCNC: 135 MMOL/L (ref 136–145)
TROPONIN T SERPL HS-MCNC: <6 NG/L
WBC NRBC COR # BLD: 11.54 10*3/MM3 (ref 3.4–10.8)
WHOLE BLOOD HOLD COAG: NORMAL
WHOLE BLOOD HOLD SPECIMEN: NORMAL

## 2023-09-25 PROCEDURE — 99284 EMERGENCY DEPT VISIT MOD MDM: CPT

## 2023-09-25 PROCEDURE — 80053 COMPREHEN METABOLIC PANEL: CPT

## 2023-09-25 PROCEDURE — 83690 ASSAY OF LIPASE: CPT

## 2023-09-25 PROCEDURE — 85025 COMPLETE CBC W/AUTO DIFF WBC: CPT

## 2023-09-25 PROCEDURE — 87804 INFLUENZA ASSAY W/OPTIC: CPT

## 2023-09-25 PROCEDURE — 83880 ASSAY OF NATRIURETIC PEPTIDE: CPT

## 2023-09-25 PROCEDURE — 71045 X-RAY EXAM CHEST 1 VIEW: CPT

## 2023-09-25 PROCEDURE — 93005 ELECTROCARDIOGRAM TRACING: CPT | Performed by: EMERGENCY MEDICINE

## 2023-09-25 PROCEDURE — 93005 ELECTROCARDIOGRAM TRACING: CPT

## 2023-09-25 PROCEDURE — 87635 SARS-COV-2 COVID-19 AMP PRB: CPT

## 2023-09-25 PROCEDURE — 83735 ASSAY OF MAGNESIUM: CPT

## 2023-09-25 PROCEDURE — 84484 ASSAY OF TROPONIN QUANT: CPT

## 2023-09-25 RX ORDER — SODIUM CHLORIDE 0.9 % (FLUSH) 0.9 %
10 SYRINGE (ML) INJECTION AS NEEDED
Status: DISCONTINUED | OUTPATIENT
Start: 2023-09-25 | End: 2023-09-25 | Stop reason: HOSPADM

## 2023-09-25 RX ORDER — CHOLECALCIFEROL (VITAMIN D3) 125 MCG
5 CAPSULE ORAL
COMMUNITY

## 2023-09-25 RX ORDER — IBUPROFEN 800 MG/1
800 TABLET ORAL EVERY 6 HOURS PRN
Qty: 20 TABLET | Refills: 0 | Status: SHIPPED | OUTPATIENT
Start: 2023-09-25

## 2023-09-25 RX ORDER — ASPIRIN 81 MG/1
324 TABLET, CHEWABLE ORAL ONCE
Status: COMPLETED | OUTPATIENT
Start: 2023-09-25 | End: 2023-09-25

## 2023-09-25 RX ADMIN — ASPIRIN 324 MG: 81 TABLET, CHEWABLE ORAL at 10:48

## 2023-09-25 NOTE — Clinical Note
[facility] EMERGENCY ROOM  [address]  [place] [address]  Phone: [phone]    Bhargavi Jeronimo was seen and treated in our emergency department on 9/25/2023.  She may return to work on 09/26/2023.         Thank you for choosing [facility].    Ashok Apodaca PA-C

## 2023-09-25 NOTE — Clinical Note
[facility] EMERGENCY ROOM  [address]  [place] [address]  Phone: [phone]    Bhargavi Jeronimo was seen and treated in our emergency department on 9/25/2023.  She may return to school on 09/26/2023.  Pending COVID result        Thank you for choosing [facility].    Ashok Apodaca PA-C

## 2023-09-25 NOTE — ED PROVIDER NOTES
Time: 11:59 AM EDT  Date of encounter:  9/25/2023  Independent Historian/Clinical History and Information was obtained by:   Patient    History is limited by: N/A    Chief Complaint   Patient presents with    Chest Pain         History of Present Illness:  Patient is a 38 y.o. year old female who presents to the emergency department for evaluation of left-sided chest pain intermittently for the past week and the patient states that its been constant today.  Patient describes it as sharp and that it radiates to her back.  Patient states that it is worse with deep breathing.  She has a history of allergies and states that she takes daily medications for it.  Patient also has complaints of dizziness that is worse going from sitting to standing and walking.  She has a history of pretension and takes medication daily.  She denies nausea, vomiting, cough.    HPI    Patient Care Team  Primary Care Provider: Maureen Plummer APRN    Past Medical History:     Allergies   Allergen Reactions    Latex Hives     Tree nuts and peanuts    Nuts Anaphylaxis     Past Medical History:   Diagnosis Date    Allergic     Anemia     Anxiety     Arthritis     Asthma     Cataract     Depression     Intermediate uveitis     Migraine     sinus trouble      Past Surgical History:   Procedure Laterality Date    CHOLECYSTECTOMY  2006    CYSTOSCOPY N/A 6/8/2021    Procedure: CYSTOSCOPY;  Surgeon: Cliff Pillai MD;  Location: [facility];  Service: Obstetrics/Gynecology;  Laterality: N/A;    DIAGNOSTIC LAPAROSCOPY TOTAL ABDOMINAL HYSTERECTOMY N/A 6/8/2021    Procedure: DIAGNOSTIC LAPAROSCOPY TOTAL ABDOMINAL HYSTERECTOMY, BILATERAL SALPINGECTOMY;  Surgeon: Cliff Pillai MD;  Location: [facility] OR;  Service: Obstetrics/Gynecology;  Laterality: N/A;    LAPAROSCOPIC LYSIS OF ADHESIONS N/A 6/30/2021    Procedure: LAPAROSCOPIC LYSIS OF ADHESIONS;  Surgeon: Cliff Pillai MD;  Location: [facility] OR;   Service: Gynecology;  Laterality: N/A;    OOPHORECTOMY Right 6/30/2021    Procedure: OOPHORECTOMY;  Surgeon: Cliff Pillai MD;  Location: [facility] OR;  Service: Obstetrics/Gynecology;  Laterality: Right;    ORIF ANKLE FRACTURE Left      Family History   Problem Relation Age of Onset    Other Mother     Fibroids Mother     Cancer Maternal Aunt     Cancer Maternal Grandmother     Diabetes Paternal Grandfather     Cancer Paternal Grandfather     Malig Hyperthermia Neg Hx        Home Medications:  Prior to Admission medications    Medication Sig Start Date End Date Taking? Authorizing Provider   Adalimumab (Humira) 40 MG/0.4ML Prefilled Syringe Kit injection Inject 0.4 mL under the skin into the appropriate area as directed Every 10 (Ten) Days.    Pa Morrell MD   amLODIPine (NORVASC) 2.5 MG tablet TAKE 1 TABLET BY MOUTH ONCE DAILY FOR BLOOD PRESSURE 3/3/23   Maureen Plummer APRN   brimonidine (ALPHAGAN) 0.2 % ophthalmic solution Administer 1 drop to both eyes 2 (Two) Times a Day. 3/9/23   Pa Morrell MD   buPROPion XL (WELLBUTRIN XL) 300 MG 24 hr tablet  2/23/23   Pa Morrell MD   busPIRone (BUSPAR) 15 MG tablet Take 1 tablet by mouth 3 (Three) Times a Day. Usually take at night x1    Pa Morrell MD   cetirizine (zyrTEC) 10 MG tablet Take 1 tablet by mouth Daily.    Pa Morrell MD   fluticasone (FLONASE) 50 MCG/ACT nasal spray 2 sprays into the nostril(s) as directed by provider Daily.    Pa Morrell MD   ibuprofen (ADVIL,MOTRIN) 600 MG tablet Take 1 tablet by mouth Every 6 (Six) Hours As Needed for Mild Pain . 6/10/21   Cliff Pillai MD   lamoTRIgine (LaMICtal) 100 MG tablet Take 1.5 tablets by mouth 2 (Two) Times a Day.    Pa Morrell MD   melatonin 5 MG tablet tablet Take 1 tablet by mouth.    Pa Morrell MD   methylPREDNISolone (MEDROL) 4 MG dose pack Take as directed on package instructions. 8/22/23    "Maureen Plummer APRN   multivitamin with minerals tablet tablet Take 1 tablet by mouth Daily.    Provider, MD Pa   prednisoLONE acetate (PRED FORTE) 1 % ophthalmic suspension  3/8/23   Provider, MD Pa   traZODone (DESYREL) 150 MG tablet  2/23/23   Provider, MD Pa        Social History:   Social History     Tobacco Use    Smoking status: Every Day     Packs/day: 0.50     Years: 20.00     Pack years: 10.00     Types: Cigarettes     Start date: 4/30/2002    Smokeless tobacco: Never   Vaping Use    Vaping Use: Never used   Substance Use Topics    Alcohol use: Not Currently     Comment: SOCIAL    Drug use: Never         Review of Systems:  Review of Systems   Respiratory:  Positive for chest tightness. Negative for cough.    Cardiovascular:  Positive for chest pain.   Gastrointestinal:  Negative for nausea and vomiting.   Neurological:  Positive for dizziness.      Physical Exam:  /88   Pulse 77   Temp 98.4 °F (36.9 °C) (Oral)   Resp 20   Ht 165.1 cm (65\")   Wt 75.1 kg (165 lb 9.1 oz)   LMP 05/20/2021 (Approximate)   SpO2 100%   BMI 27.55 kg/m²         Physical Exam  Constitutional:       General: She is not in acute distress.     Appearance: She is normal weight. She is not ill-appearing.   HENT:      Head: Normocephalic.      Mouth/Throat:      Mouth: Mucous membranes are moist.   Eyes:      Conjunctiva/sclera: Conjunctivae normal.      Pupils: Pupils are equal, round, and reactive to light.   Cardiovascular:      Rate and Rhythm: Normal rate and regular rhythm.      Heart sounds: Normal heart sounds.   Pulmonary:      Effort: Pulmonary effort is normal.      Breath sounds: Normal breath sounds.   Chest:      Chest wall: Tenderness present.   Abdominal:      General: Abdomen is flat.      Palpations: Abdomen is soft.      Tenderness: There is no abdominal tenderness. There is no guarding or rebound.   Musculoskeletal:      Cervical back: Normal range of motion and neck " supple.   Skin:     General: Skin is warm and dry.   Neurological:      General: No focal deficit present.      Mental Status: She is alert and oriented to person, place, and time.   Psychiatric:         Mood and Affect: Mood normal.         Behavior: Behavior normal.                Procedures:  Procedures      Medical Decision Making:      Comorbidities that affect care:    Asthma    External Notes reviewed:    Previous ED Note: [facility] ED visit from December 2022 for influenza and bronchitis      The following orders were placed and all results were independently analyzed by me:  Orders Placed This Encounter   Procedures    Influenza Antigen, Rapid - Swab, Nasopharynx    COVID-19,CEPHEID/MANE,COR/MICHAEL/PAD/JENNIFER/MAD IN-HOUSE(OR EMERGENT/ADD-ON),NP SWAB IN TRANSPORT MEDIA 3-4 HR TAT, RT-PCR - Swab, Nasopharynx    XR Chest 1 View    [place] Draw    High Sensitivity Troponin T    Comprehensive Metabolic Panel    Lipase    BNP    Magnesium    CBC Auto Differential    NPO Diet NPO Type: Strict NPO    Undress & Gown    Continuous Pulse Oximetry    Orthostatic Vitals    Oxygen Therapy- Nasal Cannula; Titrate 1-6 LPM Per SpO2; 90 - 95%    ECG 12 Lead Chest Pain    ECG 12 Lead ED Triage Standing Order; Chest Pain    Insert Peripheral IV    CBC & Differential    Green Top (Gel)    Lavender Top    Gold Top - SST    Light Blue Top       Medications Given in the Emergency Department:  Medications   sodium chloride 0.9 % flush 10 mL (has no administration in time range)   aspirin chewable tablet 324 mg (324 mg Oral Given 9/25/23 1048)        ED Course:    The patient was initially evaluated in the triage area where orders were placed. The patient was later dispositioned by Ashok Apodaca PA-C.      The patient was advised to stay for completion of workup which includes but is not limited to communication of labs and radiological results, reassessment and plan. The patient was advised that leaving prior to disposition by a provider  could result in critical findings that are not communicated to the patient.     ED Course as of 09/25/23 1300   Mon Sep 25, 2023   1255 Patient was fluid swab was negative, cardiac markers all negative, x-ray negative, EKG normal. [AJ]      ED Course User Index  [AJ] Ashok Apodaca PA-C       Labs:    Lab Results (last 24 hours)       Procedure Component Value Units Date/Time    High Sensitivity Troponin T [[id number]]  (Normal) Collected: 09/25/23 1048    Specimen: Blood Updated: 09/25/23 1126     HS Troponin T <6 ng/L     Narrative:      High Sensitive Troponin T Reference Range:  <10.0 ng/L- Negative Female for AMI  <15.0 ng/L- Negative Male for AMI  >=10 - Abnormal Female indicating possible myocardial injury.  >=15 - Abnormal Male indicating possible myocardial injury.   Clinicians would have to utilize clinical acumen, EKG, Troponin, and serial changes to determine if it is an Acute Myocardial Infarction or myocardial injury due to an underlying chronic condition.         CBC & Differential [[id number]]  (Abnormal) Collected: 09/25/23 1048    Specimen: Blood Updated: 09/25/23 1103    Narrative:      The following orders were created for panel order CBC & Differential.  Procedure                               Abnormality         Status                     ---------                               -----------         ------                     CBC Auto Differential[[id number]]        Abnormal            Final result                 Please view results for these tests on the individual orders.    Comprehensive Metabolic Panel [[id number]]  (Abnormal) Collected: 09/25/23 1048    Specimen: Blood Updated: 09/25/23 1127     Glucose 109 mg/dL      BUN 7 mg/dL      Creatinine 0.81 mg/dL      Sodium 135 mmol/L      Potassium 3.7 mmol/L      Comment: Slight hemolysis detected by analyzer. Results may be affected.        Chloride 98 mmol/L      CO2 24.3 mmol/L      Calcium 9.5 mg/dL      Total Protein 7.8 g/dL       Albumin 4.6 g/dL      ALT (SGPT) 11 U/L      AST (SGOT) 20 U/L      Comment: Slight hemolysis detected by analyzer. Results may be affected.        Alkaline Phosphatase 72 U/L      Total Bilirubin 0.3 mg/dL      Globulin 3.2 gm/dL      A/G Ratio 1.4 g/dL      BUN/Creatinine Ratio 8.6     Anion Gap 12.7 mmol/L      eGFR 94.2 mL/min/1.73     Narrative:      GFR Normal >60  Chronic Kidney Disease <60  Kidney Failure <15      Lipase [[id number]]  (Normal) Collected: 09/25/23 1048    Specimen: Blood Updated: 09/25/23 1126     Lipase 34 U/L     BNP [[id number]]  (Normal) Collected: 09/25/23 1048    Specimen: Blood Updated: 09/25/23 1121     proBNP <36.0 pg/mL     Narrative:      Among patients with dyspnea, NT-proBNP is highly sensitive for the detection of acute congestive heart failure. In addition NT-proBNP of <300 pg/ml effectively rules out acute congestive heart failure with 99% negative predictive value.      Magnesium [[id number]]  (Normal) Collected: 09/25/23 1048    Specimen: Blood Updated: 09/25/23 1127     Magnesium 1.9 mg/dL     CBC Auto Differential [[id number]]  (Abnormal) Collected: 09/25/23 1048    Specimen: Blood Updated: 09/25/23 1103     WBC 11.54 10*3/mm3      RBC 4.40 10*6/mm3      Hemoglobin 13.0 g/dL      Hematocrit 39.6 %      MCV 90.0 fL      MCH 29.5 pg      MCHC 32.8 g/dL      RDW 14.0 %      RDW-SD 46.6 fl      MPV 9.4 fL      Platelets 287 10*3/mm3      Neutrophil % 56.2 %      Lymphocyte % 31.5 %      Monocyte % 6.7 %      Eosinophil % 4.9 %      Basophil % 0.4 %      Immature Grans % 0.3 %      Neutrophils, Absolute 6.48 10*3/mm3      Lymphocytes, Absolute 3.64 10*3/mm3      Monocytes, Absolute 0.77 10*3/mm3      Eosinophils, Absolute 0.57 10*3/mm3      Basophils, Absolute 0.05 10*3/mm3      Immature Grans, Absolute 0.03 10*3/mm3      nRBC 0.0 /100 WBC     Influenza Antigen, Rapid - Swab, Nasopharynx [[id number]]  (Normal) Collected: 09/25/23 1207    Specimen: Swab from Nasopharynx Updated:  09/25/23 1251     Influenza A Ag, EIA Negative     Influenza B Ag, EIA Negative    COVID-19,CEPHEID/MANE,COR/MICHAEL/PAD/JENNIFER/MAD IN-HOUSE(OR EMERGENT/ADD-ON),NP SWAB IN TRANSPORT MEDIA 3-4 HR TAT, RT-PCR - Swab, Nasopharynx [[id number]]  (Normal) Collected: 09/25/23 1207    Specimen: Swab from Nasopharynx Updated: 09/25/23 1256     COVID19 Not Detected    Narrative:      Fact sheet for providers: https://www.fda.gov/media/225924/download     Fact sheet for patients: https://www.fda.gov/media/620596/download  Fact sheet for providers: https://www.fda.gov/media/765682/download     Fact sheet for patients: https://www.fda.gov/media/908827/download             Imaging:    XR Chest 1 View    Result Date: 9/25/2023  PROCEDURE: XR CHEST 1 VW  COMPARISON: [facility], , XR CHEST 1 VW, 12/07/2022, 14:55.  INDICATIONS: Chest Pain Triage Protocol  FINDINGS:  No focal or diffuse infiltrate is identified. No pleural effusion or pneumothorax. Heart size and mediastinal contour appear within normal limits.         No radiographic findings of acute cardiopulmonary abnormality.       AAKASH FRANCISCO MD       Electronically Signed and Approved By: AAKASH FRANCISCO MD on 9/25/2023 at 11:24                Differential Diagnosis and Discussion:      Chest Pain:  Based on the patient's signs and symptoms, I considered aortic dissection, myocardial infaction, pulmonary embolism, cardiac tamponade, pericarditis, pneumothorax, musculoskeletal chest pain and other differential diagnosis as an etiology of the patient's chest pain.     All labs were reviewed and interpreted by me.  All X-rays impressions were independently interpreted by me.  EKG was interpreted by me.    MDM     Amount and/or Complexity of Data Reviewed  Clinical lab tests: reviewed  Tests in the radiology section of CPT®: reviewed  Tests in the medicine section of CPT®: reviewed             Patient Care Considerations:          Consultants/Shared Management  Plan:    None    Social Determinants of Health:    Patient is independent, reliable, and has access to care.       Disposition and Care Coordination:    Discharged: The patient is suitable and stable for discharge with no need for consideration of observation or admission.    I have explained the patient´s condition, diagnoses and treatment plan based on the information available to me at this time. I have answered questions and addressed any concerns. The patient has a good  understanding of the patient´s diagnosis, condition, and treatment plan as can be expected at this point. The vital signs have been stable. The patient´s condition is stable and appropriate for discharge from the emergency department.      The patient will pursue further outpatient evaluation with the primary care physician or other designated or consulting physician as outlined in the discharge instructions. They are agreeable to this plan of care and follow-up instructions have been explained in detail. The patient has received these instructions in written format and have expressed an understanding of the discharge instructions. The patient is aware that any significant change in condition or worsening of symptoms should prompt an immediate return to this or the closest emergency department or call to 911.  I have explained discharge medications and the need for follow up with the patient/caretakers. This was also printed in the discharge instructions. Patient was discharged with the following medications and follow up:      Medication List        Changed      * ibuprofen 600 MG tablet  Commonly known as: ADVIL,MOTRIN  Take 1 tablet by mouth Every 6 (Six) Hours As Needed for Mild Pain .  What changed: Another medication with the same name was added. Make sure you understand how and when to take each.     * ibuprofen 800 MG tablet  Commonly known as: ADVIL,MOTRIN  Take 1 tablet by mouth Every 6 (Six) Hours As Needed for Moderate Pain.  What  changed: You were already taking a medication with the same name, and this prescription was added. Make sure you understand how and when to take each.           * This list has 2 medication(s) that are the same as other medications prescribed for you. Read the directions carefully, and ask your doctor or other care provider to review them with you.                   Where to Get Your Medications        These medications were sent to [facility] - [place], [place] - [address] [place] [address] - [phone]  - [phone] [address] [address]      Phone: [phone]   ibuprofen 800 MG tablet      Chaim Erickson MD  [address] [facility] [address]  [place] [id number] [phone]             Final diagnoses:   Pleuritic chest pain        ED Disposition       ED Disposition   Discharge    Condition   Stable    Comment   --               This medical record created using voice recognition software.             Ashok Apodaca PA-C  09/25/23 1300

## 2023-09-25 NOTE — DISCHARGE INSTRUCTIONS
All your blood work was within normal, your cardiac markers were all within normal, chest x-ray shows no concern for infection, EKG was normal  Take ibuprofen every 6-8 hours as needed and cough medicine as needed  Your COVID/flu result was negative  Follow-up with cardiologist and primary care

## 2023-09-26 LAB
QT INTERVAL: 372 MS
QTC INTERVAL: 412 MS

## 2023-09-27 ENCOUNTER — OFFICE VISIT (OUTPATIENT)
Dept: FAMILY MEDICINE CLINIC | Facility: CLINIC | Age: 38
End: 2023-09-27
Payer: COMMERCIAL

## 2023-09-27 VITALS
HEIGHT: 65 IN | DIASTOLIC BLOOD PRESSURE: 70 MMHG | HEART RATE: 88 BPM | SYSTOLIC BLOOD PRESSURE: 144 MMHG | TEMPERATURE: 98.4 F | BODY MASS INDEX: 27.26 KG/M2 | WEIGHT: 163.6 LBS | OXYGEN SATURATION: 100 %

## 2023-09-27 DIAGNOSIS — F17.210 CIGARETTE NICOTINE DEPENDENCE WITHOUT COMPLICATION: ICD-10-CM

## 2023-09-27 DIAGNOSIS — F41.1 GAD (GENERALIZED ANXIETY DISORDER): ICD-10-CM

## 2023-09-27 DIAGNOSIS — J40 BRONCHITIS: ICD-10-CM

## 2023-09-27 DIAGNOSIS — R05.9 COUGH, UNSPECIFIED TYPE: Primary | ICD-10-CM

## 2023-09-27 DIAGNOSIS — B37.9 YEAST INFECTION: ICD-10-CM

## 2023-09-27 DIAGNOSIS — R50.9 FEVER, UNSPECIFIED FEVER CAUSE: ICD-10-CM

## 2023-09-27 DIAGNOSIS — J30.9 ALLERGIC RHINITIS, UNSPECIFIED SEASONALITY, UNSPECIFIED TRIGGER: ICD-10-CM

## 2023-09-27 DIAGNOSIS — I10 ESSENTIAL HYPERTENSION: ICD-10-CM

## 2023-09-27 DIAGNOSIS — F31.9 BIPOLAR AFFECTIVE DISORDER, REMISSION STATUS UNSPECIFIED: ICD-10-CM

## 2023-09-27 PROCEDURE — 99214 OFFICE O/P EST MOD 30 MIN: CPT | Performed by: NURSE PRACTITIONER

## 2023-09-27 RX ORDER — BROMPHENIRAMINE MALEATE, PSEUDOEPHEDRINE HYDROCHLORIDE, AND DEXTROMETHORPHAN HYDROBROMIDE 2; 30; 10 MG/5ML; MG/5ML; MG/5ML
5 SYRUP ORAL 4 TIMES DAILY PRN
Qty: 175 ML | Refills: 0 | Status: SHIPPED | OUTPATIENT
Start: 2023-09-27

## 2023-09-27 RX ORDER — FLUCONAZOLE 150 MG/1
150 TABLET ORAL ONCE
Qty: 2 TABLET | Refills: 0 | Status: SHIPPED | OUTPATIENT
Start: 2023-09-27 | End: 2023-09-27

## 2023-09-27 RX ORDER — PREDNISONE 20 MG/1
40 TABLET ORAL DAILY
Qty: 10 TABLET | Refills: 0 | Status: SHIPPED | OUTPATIENT
Start: 2023-09-27 | End: 2023-10-02

## 2023-09-27 RX ORDER — AMOXICILLIN AND CLAVULANATE POTASSIUM 875; 125 MG/1; MG/1
1 TABLET, FILM COATED ORAL 2 TIMES DAILY
Qty: 14 TABLET | Refills: 0 | Status: SHIPPED | OUTPATIENT
Start: 2023-09-27 | End: 2023-10-04

## 2023-09-27 NOTE — PROGRESS NOTES
"Chief Complaint  Cough (Pt went to ED on 9/25/2023 for chest pains, they did a chest xray , tested negative for covid and flu.) and Fever (100.8,101.4  at home.)    Subjective        Bhargavi Jeronimo presents to [facility]  History of Present Illness  Pt presents c/o cough, nasal congestion, sore throat, and fever. States she went to ED on 9/25 with chest pain. Chest pain has resolved. Labs, EKG, CXR all unremarkable. Negative for covid, flu. Taking ibu to treat.     Reviewed all recent labs and medications.    Objective   Vital Signs:  /70   Pulse 88   Temp 98.4 °F (36.9 °C) (Temporal)   Ht 165.1 cm (65\")   Wt 74.2 kg (163 lb 9.6 oz)   SpO2 100%   BMI 27.22 kg/m²   Estimated body mass index is 27.22 kg/m² as calculated from the following:    Height as of this encounter: 165.1 cm (65\").    Weight as of this encounter: 74.2 kg (163 lb 9.6 oz).               Physical Exam  Vitals reviewed.   Constitutional:       General: She is not in acute distress.  HENT:      Head: Normocephalic.      Right Ear: Tympanic membrane normal.      Left Ear: Tympanic membrane normal.      Nose: Congestion present.      Mouth/Throat:      Pharynx: Oropharynx is clear. Posterior oropharyngeal erythema present.   Eyes:      General: No scleral icterus.     Extraocular Movements: Extraocular movements intact.      Conjunctiva/sclera: Conjunctivae normal.      Pupils: Pupils are equal, round, and reactive to light.   Cardiovascular:      Rate and Rhythm: Normal rate and regular rhythm.      Pulses: Normal pulses.      Heart sounds: Normal heart sounds.   Pulmonary:      Effort: Pulmonary effort is normal.      Breath sounds: Normal breath sounds.   Abdominal:      General: Bowel sounds are normal.      Palpations: Abdomen is soft.   Musculoskeletal:         General: Normal range of motion.      Cervical back: Neck supple.   Skin:     General: Skin is warm and dry.   Neurological:      Mental " Status: She is alert and oriented to person, place, and time.   Psychiatric:         Mood and Affect: Mood normal.         Behavior: Behavior normal.         Thought Content: Thought content normal.         Judgment: Judgment normal.      Result Review :    Common labs          12/7/2022    12:45 3/20/2023    14:38 9/25/2023    10:48   Common Labs   Glucose 87   109    BUN 8   7    Creatinine 0.66   0.81    Sodium 135   135    Potassium 4.1   3.7    Chloride 98   98    Calcium 9.1   9.5    Albumin 4.60   4.6    Total Bilirubin 0.2   0.3    Alkaline Phosphatase 86   72    AST (SGOT) 28   20    ALT (SGPT) 18   11    WBC 13.77   11.54    Hemoglobin 14.1   13.0    Hematocrit 41.4   39.6    Platelets 297   287    Total Cholesterol  168     Triglycerides  48     HDL Cholesterol  62     LDL Cholesterol   96       Data reviewed : Radiologic studies CXR and Recent hospitalization notes ED 9/25             Assessment and Plan   Diagnoses and all orders for this visit:    1. Cough, unspecified type (Primary)  Comments:  bromfed UAD prn    2. Fever, unspecified fever cause  Comments:  tyl/ibu UAD prn    3. SHASHI (generalized anxiety disorder)  Assessment & Plan:  Psychological condition is improving with treatment.  Continue current treatment regimen.  Regular aerobic exercise.  Psychological condition  will be reassessed at the next regular appointment.         4. Essential hypertension  Assessment & Plan:  Hypertension is improving with treatment.  Continue current treatment regimen.  Dietary sodium restriction.  Weight loss.  Regular aerobic exercise.  Blood pressure will be reassessed at the next regular appointment.        5. Cigarette nicotine dependence without complication  Assessment & Plan:  Tobacco use is unchanged.  Smoking cessation counseling was provided.  Pharmacotherapy was prescribed as ordered.  Tobacco use will be reassessed at the next regular appointment.      6. Bipolar affective disorder, remission status  unspecified  Assessment & Plan:  Psychological condition is improving with treatment.  Continue current treatment regimen.  Regular aerobic exercise.  Psychological condition  will be reassessed at the next regular appointment.      7. Allergic rhinitis, unspecified seasonality, unspecified trigger    8. Bronchitis  Comments:  augmentin 875mg PO bid x 7 days  prednisone 40mg PO qd x 5 days   increase rest/clear fluids    9. Yeast infection  Comments:  diflucan 150mg PO once, may repeat in 3 days if needed    Other orders  -     amoxicillin-clavulanate (AUGMENTIN) 875-125 MG per tablet; Take 1 tablet by mouth 2 (Two) Times a Day for 7 days.  Dispense: 14 tablet; Refill: 0  -     predniSONE (DELTASONE) 20 MG tablet; Take 2 tablets by mouth Daily for 5 days.  Dispense: 10 tablet; Refill: 0  -     fluconazole (Diflucan) 150 MG tablet; Take 1 tablet by mouth 1 (One) Time for 1 dose.  Dispense: 2 tablet; Refill: 0  -     brompheniramine-pseudoephedrine-DM 30-2-10 MG/5ML syrup; Take 5 mL by mouth 4 (Four) Times a Day As Needed for Cough.  Dispense: 175 mL; Refill: 0             Follow Up   Return if symptoms worsen or fail to improve.  Patient was given instructions and counseling regarding her condition or for health maintenance advice. Please see specific information pulled into the AVS if appropriate.

## 2023-09-27 NOTE — PATIENT INSTRUCTIONS
Acute Bronchitis, Adult    Acute bronchitis is when air tubes in the lungs (bronchi) suddenly get swollen. The condition can make it hard for you to breathe. In adults, acute bronchitis usually goes away within 2 weeks. A cough caused by bronchitis may last up to 3 weeks. Smoking, allergies, and asthma can make the condition worse.  What are the causes?  Germs that cause cold and flu (viruses). The most common cause of this condition is the virus that causes the common cold.  Bacteria.  Substances that bother (irritate) the lungs, including:  Smoke from cigarettes and other types of tobacco.  Dust and pollen.  Fumes from chemicals, gases, or burned fuel.  Indoor or outdoor air pollution.  What increases the risk?  A weak body's defense system. This is also called the immune system.  Any condition that affects your lungs and breathing, such as asthma.  What are the signs or symptoms?  A cough.  Coughing up clear, yellow, or green mucus.  Making high-pitched whistling sounds when you breathe, most often when you breathe out (wheezing).  Runny or stuffy nose.  Having too much mucus in your lungs (chest congestion).  Shortness of breath.  Body aches.  A sore throat.  How is this treated?  Acute bronchitis may go away over time without treatment. Your doctor may tell you to:  Drink more fluids. This will help thin your mucus so it is easier to cough up.  Use a device that gets medicine into your lungs (inhaler).  Use a vaporizer or a humidifier. These are machines that add water to the air. This helps with coughing and poor breathing.  Take a medicine that thins mucus and helps clear it from your lungs.  Take a medicine that prevents or stops coughing.  It is not common to take an antibiotic medicine for this condition.  Follow these instructions at home:    Take over-the-counter and prescription medicines only as told by your doctor.  Use an inhaler, vaporizer, or humidifier as told by your doctor.  Take two teaspoons  (10 mL) of honey at bedtime. This helps lessen your coughing at night.  Drink enough fluid to keep your pee (urine) pale yellow.  Do not smoke or use any products that contain nicotine or tobacco. If you need help quitting, ask your doctor.  Get a lot of rest.  Return to your normal activities when your doctor says that it is safe.  Keep all follow-up visits.  How is this prevented?    Wash your hands often with soap and water for at least 20 seconds. If you cannot use soap and water, use hand .  Avoid contact with people who have cold symptoms.  Try not to touch your mouth, nose, or eyes with your hands.  Avoid breathing in smoke or chemical fumes.  Make sure to get the flu shot every year.  Contact a doctor if:  Your symptoms do not get better in 2 weeks.  You have trouble coughing up the mucus.  Your cough keeps you awake at night.  You have a fever.  Get help right away if:  You cough up blood.  You have chest pain.  You have very bad shortness of breath.  You faint or keep feeling like you are going to faint.  You have a very bad headache.  Your fever or chills get worse.  These symptoms may be an emergency. Get help right away. Call your local emergency services (911 in the [place]  Do not wait to see if the symptoms will go away.  Do not drive yourself to the hospital.  Summary  Acute bronchitis is when air tubes in the lungs (bronchi) suddenly get swollen. In adults, acute bronchitis usually goes away within 2 weeks.  Drink more fluids. This will help thin your mucus so it is easier to cough up.  Take over-the-counter and prescription medicines only as told by your doctor.  Contact a doctor if your symptoms do not improve after 2 weeks of treatment.  This information is not intended to replace advice given to you by your health care provider. Make sure you discuss any questions you have with your health care provider.  Document Revised: 04/20/2022 Document Reviewed: 04/20/2022  [employer]  Education © 2023 [employer]

## 2023-09-28 ENCOUNTER — TELEPHONE (OUTPATIENT)
Dept: FAMILY MEDICINE CLINIC | Facility: CLINIC | Age: 38
End: 2023-09-28
Payer: COMMERCIAL

## 2023-09-28 NOTE — TELEPHONE ENCOUNTER
Caller: Bhargavi Jeronimo    Relationship: Self    Best call back number: [phone]    What form or medical record are you requesting: DOCTOR NOTE        How would you like to receive the form or medical records (pick-up, mail, fax):     MYCHART AND         Timeframe paperwork needed: ASAP    Additional notes:      THE PATIENT SAID SHE IS NEEDING A DOCTOR NOTE FOR HER APPOINTMENT AND FOR TOMORROW AS WELL. SHE SAID THAT PCP [facility] TOLD HER TO CALL IF A NOTE IS NEEDED FOR TOMORROW AS WELL

## 2023-09-28 NOTE — TELEPHONE ENCOUNTER
Spoke with Maureen via telephone, ok to extend note for patient to include tomorrow. Will send through tamiat, [patient aware

## 2023-09-28 NOTE — TELEPHONE ENCOUNTER
Patient was seen 9/27/23.  She is asking for note to cover from appt through 9/29/23.  Is this ok to extend another day?

## 2023-10-23 ENCOUNTER — OFFICE VISIT (OUTPATIENT)
Dept: FAMILY MEDICINE CLINIC | Facility: CLINIC | Age: 38
End: 2023-10-23
Payer: COMMERCIAL

## 2023-10-23 VITALS
DIASTOLIC BLOOD PRESSURE: 92 MMHG | TEMPERATURE: 98.4 F | HEART RATE: 81 BPM | BODY MASS INDEX: 27.26 KG/M2 | HEIGHT: 65 IN | OXYGEN SATURATION: 99 % | SYSTOLIC BLOOD PRESSURE: 139 MMHG | WEIGHT: 163.6 LBS

## 2023-10-23 DIAGNOSIS — F31.9 BIPOLAR AFFECTIVE DISORDER, REMISSION STATUS UNSPECIFIED: ICD-10-CM

## 2023-10-23 DIAGNOSIS — J30.9 ALLERGIC RHINITIS, UNSPECIFIED SEASONALITY, UNSPECIFIED TRIGGER: ICD-10-CM

## 2023-10-23 DIAGNOSIS — Z12.31 ENCOUNTER FOR SCREENING MAMMOGRAM FOR MALIGNANT NEOPLASM OF BREAST: Primary | ICD-10-CM

## 2023-10-23 DIAGNOSIS — I10 ESSENTIAL HYPERTENSION: ICD-10-CM

## 2023-10-23 DIAGNOSIS — F17.210 CIGARETTE NICOTINE DEPENDENCE WITHOUT COMPLICATION: ICD-10-CM

## 2023-10-23 DIAGNOSIS — J01.01 ACUTE RECURRENT MAXILLARY SINUSITIS: ICD-10-CM

## 2023-10-23 DIAGNOSIS — F41.1 GAD (GENERALIZED ANXIETY DISORDER): ICD-10-CM

## 2023-10-23 RX ORDER — FLUCONAZOLE 150 MG/1
150 TABLET ORAL ONCE
Qty: 2 TABLET | Refills: 0 | Status: SHIPPED | OUTPATIENT
Start: 2023-10-23 | End: 2023-10-23

## 2023-10-23 RX ORDER — AMLODIPINE BESYLATE 2.5 MG/1
2.5 TABLET ORAL DAILY
Qty: 30 TABLET | Refills: 3 | Status: SHIPPED | OUTPATIENT
Start: 2023-10-23

## 2023-10-23 RX ORDER — FLUTICASONE PROPIONATE 50 MCG
2 SPRAY, SUSPENSION (ML) NASAL DAILY
Qty: 1 G | Refills: 5 | Status: SHIPPED | OUTPATIENT
Start: 2023-10-23

## 2023-10-23 RX ORDER — METHYLPREDNISOLONE 4 MG/1
TABLET ORAL
Qty: 21 TABLET | Refills: 0 | Status: SHIPPED | OUTPATIENT
Start: 2023-10-23

## 2023-10-23 RX ORDER — AZITHROMYCIN 250 MG/1
TABLET, FILM COATED ORAL
Qty: 6 TABLET | Refills: 0 | Status: SHIPPED | OUTPATIENT
Start: 2023-10-23

## 2023-10-23 NOTE — PATIENT INSTRUCTIONS
Blood Pressure Record Sheet  To take your blood pressure, you will need a blood pressure machine. You may be prescribed one, or you can buy a blood pressure machine (blood pressure monitor) at your clinic, drug store, or online. When choosing one, look for these features:  An automatic monitor that has an arm cuff.  A cuff that wraps snugly, but not too tightly, around your upper arm. You should be able to fit only one finger between your arm and the cuff.  A device that stores blood pressure reading results.  Do not choose a monitor that measures your blood pressure from your wrist or finger.  Follow your health care provider's instructions for how to take your blood pressure. To use this form:  Get one reading in the morning (a.m.) before you take any medicines.  Get one reading in the evening (p.m.) before supper.  Take at least two readings with each blood pressure check. This makes sure the results are correct. Wait 1-2 minutes between measurements.  Write down the results in the spaces on this form.  Repeat this once a week, or as told by your health care provider.  Make a follow-up appointment with your health care provider to discuss the results.  Blood pressure log  Date: _______________________  a.m. _____________________(1st reading) _____________________(2nd reading)  p.m. _____________________(1st reading) _____________________(2nd reading)  Date: _______________________  a.m. _____________________(1st reading) _____________________(2nd reading)  p.m. _____________________(1st reading) _____________________(2nd reading)  Date: _______________________  a.m. _____________________(1st reading) _____________________(2nd reading)  p.m. _____________________(1st reading) _____________________(2nd reading)  Date: _______________________  a.m. _____________________(1st reading) _____________________(2nd reading)  p.m. _____________________(1st reading) _____________________(2nd reading)  Date:  _______________________  a.m. _____________________(1st reading) _____________________(2nd reading)  p.m. _____________________(1st reading) _____________________(2nd reading)  This information is not intended to replace advice given to you by your health care provider. Make sure you discuss any questions you have with your health care provider.  Document Revised: 09/01/2022 Document Reviewed: 09/01/2022  [employer]vier Patient Education © 2023 [employer]

## 2023-10-23 NOTE — PROGRESS NOTES
"Chief Complaint  Med Refill (Blood pressure medication) and sinus headaches/congestion regularly     Subjective        Bhargavi Jeronimo presents to [facility]  History of Present Illness  Pt presents c/o HA, sinus pain/pressure, nasal congestion x several weeks. States she is out of amlodipine. Denies fever, chills, SOB, chest pain, abd pain, dizziness, blurred vision, N/V/D or other. No known sick contacts. States sinus infections seems to be recurring every month. Has been to allergist in the past, but did not start allergy medications. States they were supposed to call her to set up but she never heard from them. This was in 2021. Pt also with XR of sinus in 2021 which was normal. Taking zyrtec as directed, but states she is out of flonase.     Reviewed all recent labs and medications.      Objective   Vital Signs:  /92   Pulse 81   Temp 98.4 °F (36.9 °C) (Temporal)   Ht 165.1 cm (65\")   Wt 74.2 kg (163 lb 9.6 oz)   SpO2 99%   BMI 27.22 kg/m²   Estimated body mass index is 27.22 kg/m² as calculated from the following:    Height as of this encounter: 165.1 cm (65\").    Weight as of this encounter: 74.2 kg (163 lb 9.6 oz).               Physical Exam  Vitals reviewed.   Constitutional:       General: She is not in acute distress.  HENT:      Head: Normocephalic.      Right Ear: Tympanic membrane normal.      Left Ear: Tympanic membrane normal.      Nose: Nose normal. Congestion present.      Right Sinus: Maxillary sinus tenderness present.      Left Sinus: Maxillary sinus tenderness present.      Mouth/Throat:      Pharynx: Oropharynx is clear. No posterior oropharyngeal erythema.   Eyes:      General: No scleral icterus.     Extraocular Movements: Extraocular movements intact.      Conjunctiva/sclera: Conjunctivae normal.      Pupils: Pupils are equal, round, and reactive to light.   Cardiovascular:      Rate and Rhythm: Normal rate and regular rhythm.      Pulses: " Normal pulses.      Heart sounds: Normal heart sounds.   Pulmonary:      Effort: Pulmonary effort is normal.      Breath sounds: Normal breath sounds.   Abdominal:      General: Bowel sounds are normal.      Palpations: Abdomen is soft.   Musculoskeletal:         General: Normal range of motion.      Cervical back: Neck supple.   Skin:     General: Skin is warm and dry.   Neurological:      Mental Status: She is alert and oriented to person, place, and time.   Psychiatric:         Mood and Affect: Mood normal.         Behavior: Behavior normal.         Thought Content: Thought content normal.         Judgment: Judgment normal.        Result Review :    Common labs          12/7/2022    12:45 3/20/2023    14:38 9/25/2023    10:48   Common Labs   Glucose 87   109    BUN 8   7    Creatinine 0.66   0.81    Sodium 135   135    Potassium 4.1   3.7    Chloride 98   98    Calcium 9.1   9.5    Albumin 4.60   4.6    Total Bilirubin 0.2   0.3    Alkaline Phosphatase 86   72    AST (SGOT) 28   20    ALT (SGPT) 18   11    WBC 13.77   11.54    Hemoglobin 14.1   13.0    Hematocrit 41.4   39.6    Platelets 297   287    Total Cholesterol  168     Triglycerides  48     HDL Cholesterol  62     LDL Cholesterol   96       Data reviewed : Radiologic studies XR sinus 2021             Assessment and Plan   Diagnoses and all orders for this visit:    1. Encounter for screening mammogram for malignant neoplasm of breast (Primary)  -     Mammo Screening Digital Tomosynthesis Bilateral With CAD; Future    2. Essential hypertension  Assessment & Plan:  Hypertension is  elevated in office today  .  Dietary sodium restriction.  Regular aerobic exercise.  Stop smoking.  Ambulatory blood pressure monitoring.  Refilled amlodipine 2.5mg PO qd.   Blood pressure will be reassessed in 2 weeks nurse visit .    Orders:  -     amLODIPine (NORVASC) 2.5 MG tablet; Take 1 tablet by mouth Daily. for blood pressure  Dispense: 30 tablet; Refill: 3    3. Allergic  rhinitis, unspecified seasonality, unspecified trigger  -     Ambulatory Referral to Allergy    4. Cigarette nicotine dependence without complication  Assessment & Plan:  Tobacco use is unchanged.  Smoking cessation counseling was provided.  Pharmacotherapy was prescribed as ordered.  Tobacco use will be reassessed at the next regular appointment.      5. SHASHI (generalized anxiety disorder)  Assessment & Plan:  Psychological condition is improving with treatment.  Continue current treatment regimen.  Regular aerobic exercise.  Psychological condition  will be reassessed at the next regular appointment.         6. Bipolar affective disorder, remission status unspecified  Assessment & Plan:  Psychological condition is improving with treatment.  Continue current treatment regimen.  Regular aerobic exercise.  Psychological condition  will be reassessed at the next regular appointment.      7. Acute recurrent maxillary sinusitis  Comments:  zpack UAD   medrol dose brent UAD   referral to allergist   increase rest/clear fluids    Other orders  -     fluticasone (FLONASE) 50 MCG/ACT nasal spray; 2 sprays into the nostril(s) as directed by provider Daily.  Dispense: 1 g; Refill: 5  -     azithromycin (Zithromax Z-Brent) 250 MG tablet; UAD  Dispense: 6 tablet; Refill: 0  -     methylPREDNISolone (MEDROL) 4 MG dose pack; Take as directed on package instructions.  Dispense: 21 tablet; Refill: 0  -     fluconazole (Diflucan) 150 MG tablet; Take 1 tablet by mouth 1 (One) Time for 1 dose. May repeat in 3 days if needed  Dispense: 2 tablet; Refill: 0             Follow Up   Return in about 2 weeks (around 11/6/2023) for nurse visit bp check .  Patient was given instructions and counseling regarding her condition or for health maintenance advice. Please see specific information pulled into the AVS if appropriate.

## 2023-10-23 NOTE — ASSESSMENT & PLAN NOTE
Hypertension is  elevated in office today  .  Dietary sodium restriction.  Regular aerobic exercise.  Stop smoking.  Ambulatory blood pressure monitoring.  Refilled amlodipine 2.5mg PO qd.   Blood pressure will be reassessed in 2 weeks nurse visit .

## 2023-12-05 ENCOUNTER — APPOINTMENT (OUTPATIENT)
Dept: GENERAL RADIOLOGY | Facility: HOSPITAL | Age: 38
End: 2023-12-05
Payer: COMMERCIAL

## 2023-12-05 ENCOUNTER — HOSPITAL ENCOUNTER (EMERGENCY)
Facility: HOSPITAL | Age: 38
Discharge: HOME | End: 2023-12-05
Attending: EMERGENCY MEDICINE
Payer: COMMERCIAL

## 2023-12-05 VITALS
OXYGEN SATURATION: 99 % | SYSTOLIC BLOOD PRESSURE: 138 MMHG | WEIGHT: 155.2 LBS | HEIGHT: 64 IN | RESPIRATION RATE: 18 BRPM | DIASTOLIC BLOOD PRESSURE: 90 MMHG | TEMPERATURE: 97.9 F | HEART RATE: 73 BPM | BODY MASS INDEX: 26.5 KG/M2

## 2023-12-05 DIAGNOSIS — R07.9 CHEST PAIN, UNSPECIFIED TYPE: ICD-10-CM

## 2023-12-05 DIAGNOSIS — R20.2 PARESTHESIAS: Primary | ICD-10-CM

## 2023-12-05 LAB
ALBUMIN SERPL-MCNC: 4.5 G/DL (ref 3.5–5.2)
ALBUMIN/GLOB SERPL: 1.4 G/DL
ALP SERPL-CCNC: 74 U/L (ref 39–117)
ALT SERPL W P-5'-P-CCNC: 23 U/L (ref 1–33)
AMPHET+METHAMPHET UR QL: POSITIVE
ANION GAP SERPL CALCULATED.3IONS-SCNC: 12.5 MMOL/L (ref 5–15)
AST SERPL-CCNC: 30 U/L (ref 1–32)
BARBITURATES UR QL SCN: NEGATIVE
BASOPHILS # BLD AUTO: 0.07 10*3/MM3 (ref 0–0.2)
BASOPHILS NFR BLD AUTO: 0.9 % (ref 0–1.5)
BENZODIAZ UR QL SCN: NEGATIVE
BILIRUB SERPL-MCNC: 0.3 MG/DL (ref 0–1.2)
BILIRUB UR QL STRIP: NEGATIVE
BUN SERPL-MCNC: 6 MG/DL (ref 6–20)
BUN/CREAT SERPL: 10.2 (ref 7–25)
CALCIUM SPEC-SCNC: 9 MG/DL (ref 8.6–10.5)
CANNABINOIDS SERPL QL: NEGATIVE
CHLORIDE SERPL-SCNC: 99 MMOL/L (ref 98–107)
CLARITY UR: CLEAR
CO2 SERPL-SCNC: 23.5 MMOL/L (ref 22–29)
COCAINE UR QL: NEGATIVE
COLOR UR: YELLOW
CREAT SERPL-MCNC: 0.59 MG/DL (ref 0.57–1)
DEPRECATED RDW RBC AUTO: 45.9 FL (ref 37–54)
EGFRCR SERPLBLD CKD-EPI 2021: 117 ML/MIN/1.73
EOSINOPHIL # BLD AUTO: 0.12 10*3/MM3 (ref 0–0.4)
EOSINOPHIL NFR BLD AUTO: 1.5 % (ref 0.3–6.2)
ERYTHROCYTE [DISTWIDTH] IN BLOOD BY AUTOMATED COUNT: 14.2 % (ref 12.3–15.4)
FENTANYL UR-MCNC: NEGATIVE NG/ML
FLUAV SUBTYP SPEC NAA+PROBE: NOT DETECTED
FLUBV RNA ISLT QL NAA+PROBE: NOT DETECTED
GLOBULIN UR ELPH-MCNC: 3.3 GM/DL
GLUCOSE SERPL-MCNC: 104 MG/DL (ref 65–99)
GLUCOSE UR STRIP-MCNC: NEGATIVE MG/DL
HCT VFR BLD AUTO: 41.3 % (ref 34–46.6)
HGB BLD-MCNC: 13.8 G/DL (ref 12–15.9)
HGB UR QL STRIP.AUTO: NEGATIVE
HOLD SPECIMEN: NORMAL
HOLD SPECIMEN: NORMAL
IMM GRANULOCYTES # BLD AUTO: 0.02 10*3/MM3 (ref 0–0.05)
IMM GRANULOCYTES NFR BLD AUTO: 0.2 % (ref 0–0.5)
KETONES UR QL STRIP: NEGATIVE
LEUKOCYTE ESTERASE UR QL STRIP.AUTO: NEGATIVE
LYMPHOCYTES # BLD AUTO: 2.77 10*3/MM3 (ref 0.7–3.1)
LYMPHOCYTES NFR BLD AUTO: 34.2 % (ref 19.6–45.3)
MCH RBC QN AUTO: 29.6 PG (ref 26.6–33)
MCHC RBC AUTO-ENTMCNC: 33.4 G/DL (ref 31.5–35.7)
MCV RBC AUTO: 88.4 FL (ref 79–97)
METHADONE UR QL SCN: NEGATIVE
MONOCYTES # BLD AUTO: 0.71 10*3/MM3 (ref 0.1–0.9)
MONOCYTES NFR BLD AUTO: 8.8 % (ref 5–12)
NEUTROPHILS NFR BLD AUTO: 4.42 10*3/MM3 (ref 1.7–7)
NEUTROPHILS NFR BLD AUTO: 54.4 % (ref 42.7–76)
NITRITE UR QL STRIP: NEGATIVE
NRBC BLD AUTO-RTO: 0 /100 WBC (ref 0–0.2)
NT-PROBNP SERPL-MCNC: <36 PG/ML (ref 0–450)
OPIATES UR QL: NEGATIVE
OXYCODONE UR QL SCN: NEGATIVE
PH UR STRIP.AUTO: 6 [PH] (ref 5–8)
PLATELET # BLD AUTO: 369 10*3/MM3 (ref 140–450)
PMV BLD AUTO: 9.3 FL (ref 6–12)
POTASSIUM SERPL-SCNC: 4.2 MMOL/L (ref 3.5–5.2)
PROT SERPL-MCNC: 7.8 G/DL (ref 6–8.5)
PROT UR QL STRIP: NEGATIVE
QT INTERVAL: 375 MS
QTC INTERVAL: 422 MS
RBC # BLD AUTO: 4.67 10*6/MM3 (ref 3.77–5.28)
RSV RNA NPH QL NAA+NON-PROBE: NOT DETECTED
SARS-COV-2 RNA RESP QL NAA+PROBE: NOT DETECTED
SODIUM SERPL-SCNC: 135 MMOL/L (ref 136–145)
SP GR UR STRIP: 1.01 (ref 1–1.03)
T4 FREE SERPL-MCNC: 1.36 NG/DL (ref 0.93–1.7)
TROPONIN T SERPL HS-MCNC: <6 NG/L
TSH SERPL DL<=0.05 MIU/L-ACNC: 2.68 UIU/ML (ref 0.27–4.2)
UROBILINOGEN UR QL STRIP: NORMAL
WBC NRBC COR # BLD AUTO: 8.11 10*3/MM3 (ref 3.4–10.8)
WHOLE BLOOD HOLD COAG: NORMAL
WHOLE BLOOD HOLD SPECIMEN: NORMAL

## 2023-12-05 PROCEDURE — 80307 DRUG TEST PRSMV CHEM ANLYZR: CPT | Performed by: NURSE PRACTITIONER

## 2023-12-05 PROCEDURE — 71045 X-RAY EXAM CHEST 1 VIEW: CPT

## 2023-12-05 PROCEDURE — 83880 ASSAY OF NATRIURETIC PEPTIDE: CPT | Performed by: EMERGENCY MEDICINE

## 2023-12-05 PROCEDURE — 93005 ELECTROCARDIOGRAM TRACING: CPT | Performed by: EMERGENCY MEDICINE

## 2023-12-05 PROCEDURE — 84439 ASSAY OF FREE THYROXINE: CPT | Performed by: NURSE PRACTITIONER

## 2023-12-05 PROCEDURE — 93005 ELECTROCARDIOGRAM TRACING: CPT

## 2023-12-05 PROCEDURE — 81003 URINALYSIS AUTO W/O SCOPE: CPT | Performed by: NURSE PRACTITIONER

## 2023-12-05 PROCEDURE — 80050 GENERAL HEALTH PANEL: CPT

## 2023-12-05 PROCEDURE — 36415 COLL VENOUS BLD VENIPUNCTURE: CPT

## 2023-12-05 PROCEDURE — 87637 SARSCOV2&INF A&B&RSV AMP PRB: CPT | Performed by: NURSE PRACTITIONER

## 2023-12-05 PROCEDURE — 99284 EMERGENCY DEPT VISIT MOD MDM: CPT

## 2023-12-05 PROCEDURE — 84484 ASSAY OF TROPONIN QUANT: CPT | Performed by: EMERGENCY MEDICINE

## 2023-12-05 RX ORDER — SODIUM CHLORIDE 0.9 % (FLUSH) 0.9 %
10 SYRINGE (ML) INJECTION AS NEEDED
Status: DISCONTINUED | OUTPATIENT
Start: 2023-12-05 | End: 2023-12-05 | Stop reason: HOSPADM

## 2023-12-05 NOTE — ED PROVIDER NOTES
Time: 4:29 AM EST  Date of encounter:  12/5/2023  Independent Historian/Clinical History and Information was obtained by:   Patient    History is limited by: N/A    Chief Complaint: chest pain      History of Present Illness:  Patient is a 38 y.o. year old female who presents to the emergency department for evaluation of Chest pain.  No radiation.  No alleviating or aggravating factors.  She also reports numbness in her right lower extremity and tip of her tongue.  She had a mild cough.  No fever or chills.      HPI    Patient Care Team  Primary Care Provider: Maureen Plummer APRN    Past Medical History:     Allergies   Allergen Reactions    Latex Hives     Tree nuts and peanuts    Nuts Anaphylaxis     Past Medical History:   Diagnosis Date    Allergic     Anemia     Anxiety     Arthritis     Asthma     Cataract     Depression     Intermediate uveitis     Migraine     sinus trouble      Past Surgical History:   Procedure Laterality Date    CHOLECYSTECTOMY  2006    CYSTOSCOPY N/A 6/8/2021    Procedure: CYSTOSCOPY;  Surgeon: Cliff Pillai MD;  Location: [facility] (formerly [facility]3];  Service: Obstetrics/Gynecology;  Laterality: N/A;    DIAGNOSTIC LAPAROSCOPY TOTAL ABDOMINAL HYSTERECTOMY N/A 6/8/2021    Procedure: DIAGNOSTIC LAPAROSCOPY TOTAL ABDOMINAL HYSTERECTOMY, BILATERAL SALPINGECTOMY;  Surgeon: Cliff Pillai MD;  Location: [facility] (formerly [facility])[3];  Service: Obstetrics/Gynecology;  Laterality: N/A;    LAPAROSCOPIC LYSIS OF ADHESIONS N/A 6/30/2021    Procedure: LAPAROSCOPIC LYSIS OF ADHESIONS;  Surgeon: Cliff Pillai MD;  Location: [facility] OR;  Service: Gynecology;  Laterality: N/A;    OOPHORECTOMY Right 6/30/2021    Procedure: OOPHORECTOMY;  Surgeon: Cliff Pillai MD;  Location: [facility] OR;  Service: Obstetrics/Gynecology;  Laterality: Right;    ORIF ANKLE FRACTURE Left      Family History   Problem Relation Age of Onset    Other Mother     Fibroids Mother     Cancer Maternal  Aunt     Cancer Maternal Grandmother     Diabetes Paternal Grandfather     Cancer Paternal Grandfather     Malig Hyperthermia Neg Hx        Home Medications:  Prior to Admission medications    Medication Sig Start Date End Date Taking? Authorizing Provider   Adalimumab (Humira) 40 MG/0.4ML Prefilled Syringe Kit injection Inject 0.4 mL under the skin into the appropriate area as directed Every 10 (Ten) Days.    Pa Morrell MD   amLODIPine (NORVASC) 2.5 MG tablet Take 1 tablet by mouth Daily. for blood pressure 10/23/23   Maureen Plummer APRN   azithromycin (Zithromax Z-Brent) 250 MG tablet UAD 10/23/23   Maureen Plummer APRN   brimonidine (ALPHAGAN) 0.2 % ophthalmic solution Administer 1 drop to both eyes 2 (Two) Times a Day. 3/9/23   Pa Morrell MD   buPROPion XL (WELLBUTRIN XL) 300 MG 24 hr tablet  2/23/23   Pa Morrell MD   busPIRone (BUSPAR) 15 MG tablet Take 1 tablet by mouth 3 (Three) Times a Day. Usually take at night x1    Pa Morrell MD   cetirizine (zyrTEC) 10 MG tablet Take 1 tablet by mouth Daily.    Pa Morrell MD   fluticasone (FLONASE) 50 MCG/ACT nasal spray 2 sprays into the nostril(s) as directed by provider Daily. 10/23/23   Maureen Plummer APRN   lamoTRIgine (LaMICtal) 100 MG tablet Take 1.5 tablets by mouth 2 (Two) Times a Day.    Pa Morrell MD   melatonin 5 MG tablet tablet Take 1 tablet by mouth.    Pa Morrell MD   methylPREDNISolone (MEDROL) 4 MG dose pack Take as directed on package instructions. 10/23/23   Maureen Plummer APRN   multivitamin with minerals tablet tablet Take 1 tablet by mouth Daily.    Pa Morrell MD   prednisoLONE acetate (PRED FORTE) 1 % ophthalmic suspension  3/8/23   Pa Morrell MD   traZODone (DESYREL) 150 MG tablet  2/23/23   Pa Morrell MD        Social History:   Social History     Tobacco Use    Smoking status: Every Day      "Packs/day: 0.50     Years: 20.00     Additional pack years: 0.00     Total pack years: 10.00     Types: Cigarettes     Start date: 4/30/2002    Smokeless tobacco: Never   Vaping Use    Vaping Use: Never used   Substance Use Topics    Alcohol use: Not Currently     Comment: SOCIAL    Drug use: Never         Review of Systems:  Review of Systems   Constitutional:  Negative for chills and fever.   HENT:  Negative for congestion, ear pain and sore throat.    Eyes:  Negative for pain.   Respiratory:  Positive for cough and shortness of breath. Negative for chest tightness.    Cardiovascular:  Positive for chest pain.   Gastrointestinal:  Negative for abdominal pain, diarrhea, nausea and vomiting.   Genitourinary:  Negative for flank pain and hematuria.   Musculoskeletal:  Negative for joint swelling.   Skin:  Negative for pallor.   Neurological:  Positive for numbness. Negative for seizures and headaches.   All other systems reviewed and are negative.       Physical Exam:  /90   Pulse 73   Temp 97.9 °F (36.6 °C) (Oral)   Resp 18   Ht 162.6 cm (64\")   Wt 70.4 kg (155 lb 3.3 oz)   LMP 05/20/2021 (Approximate)   SpO2 99%   BMI 26.64 kg/m²     Physical Exam  Constitutional:       Appearance: Normal appearance.   HENT:      Head: Normocephalic and atraumatic.      Nose: Nose normal.      Mouth/Throat:      Mouth: Mucous membranes are moist.   Eyes:      Extraocular Movements: Extraocular movements intact.      Conjunctiva/sclera: Conjunctivae normal.      Pupils: Pupils are equal, round, and reactive to light.   Cardiovascular:      Rate and Rhythm: Normal rate and regular rhythm.      Pulses: Normal pulses.      Heart sounds: Normal heart sounds.   Pulmonary:      Effort: Pulmonary effort is normal.      Breath sounds: Normal breath sounds.   Abdominal:      General: There is no distension.      Palpations: Abdomen is soft.      Tenderness: There is no abdominal tenderness.   Musculoskeletal:         General: " Normal range of motion.      Cervical back: Normal range of motion.   Skin:     General: Skin is warm and dry.      Capillary Refill: Capillary refill takes less than 2 seconds.   Neurological:      General: No focal deficit present.      Mental Status: She is alert and oriented to person, place, and time. Mental status is at baseline.   Psychiatric:         Mood and Affect: Mood normal.         Behavior: Behavior normal.                  Procedures:  Procedures      Medical Decision Making:      Comorbidities that affect care:    Anxiety. Depression, Asthma    External Notes reviewed:    Previous Clinic Note: Patient was seen by her PCP on 10/23/2023 for medication refill hypertension mammogram anxiety.      The following orders were placed and all results were independently analyzed by me:  Orders Placed This Encounter   Procedures    COVID-19, FLU A/B, RSV PCR 1 HR TAT - Swab, Nasopharynx    XR Chest 1 View    [place] Draw    Comprehensive Metabolic Panel    BNP    Single High Sensitivity Troponin T    CBC Auto Differential    Urinalysis With Microscopic If Indicated (No Culture) - Urine, Clean Catch    Urine Drug Screen - Urine, Clean Catch    TSH    T4, Free    NPO Diet NPO Type: Strict NPO    Undress & Gown    Continuous Pulse Oximetry    Vital Signs    Oxygen Therapy- Nasal Cannula; Titrate 1-6 LPM Per SpO2; 90 - 95%    ECG 12 Lead ED Triage Standing Order; SOA    Insert Peripheral IV    CBC & Differential    Green Top (Gel)    Lavender Top    Gold Top - SST    Light Blue Top       Medications Given in the Emergency Department:  Medications   sodium chloride 0.9 % flush 10 mL (has no administration in time range)        ED Course:    ED Course as of 12/05/23 0444   Tue Dec 05, 2023   0442 ECG 12 Lead ED Triage Standing Order; SOA  Normal sinus rhythm with rate of 76. QRS normal. SD interval normal. QTc interval is normal. No ST elevation or depression. No T wave abnormalities. No change when compared to  trevor. This EKG was interpreted by me.        [LD]      ED Course User Index  [LD] Nancy Kovacs MD       Labs:    Lab Results (last 24 hours)       Procedure Component Value Units Date/Time    CBC & Differential [[id number]]  (Normal) Collected: 12/05/23 0250    Specimen: Blood Updated: 12/05/23 0258    Narrative:      The following orders were created for panel order CBC & Differential.  Procedure                               Abnormality         Status                     ---------                               -----------         ------                     CBC Auto Differential[[id number]]        Normal              Final result                 Please view results for these tests on the individual orders.    Comprehensive Metabolic Panel [[id number]]  (Abnormal) Collected: 12/05/23 0250    Specimen: Blood Updated: 12/05/23 0333     Glucose 104 mg/dL      BUN 6 mg/dL      Creatinine 0.59 mg/dL      Sodium 135 mmol/L      Potassium 4.2 mmol/L      Comment: Slight hemolysis detected by analyzer. Result may be falsely elevated.        Chloride 99 mmol/L      CO2 23.5 mmol/L      Calcium 9.0 mg/dL      Total Protein 7.8 g/dL      Albumin 4.5 g/dL      ALT (SGPT) 23 U/L      AST (SGOT) 30 U/L      Alkaline Phosphatase 74 U/L      Total Bilirubin 0.3 mg/dL      Globulin 3.3 gm/dL      A/G Ratio 1.4 g/dL      BUN/Creatinine Ratio 10.2     Anion Gap 12.5 mmol/L      eGFR 117.0 mL/min/1.73     Narrative:      GFR Normal >60  Chronic Kidney Disease <60  Kidney Failure <15      BNP [[id number]]  (Normal) Collected: 12/05/23 0250    Specimen: Blood Updated: 12/05/23 0338     proBNP <36.0 pg/mL     Narrative:      This assay is used as an aid in the diagnosis of individuals suspected of having heart failure. It can be used as an aid in the diagnosis of acute decompensated heart failure (ADHF) in patients presenting with signs and symptoms of ADHF to the emergency department (ED). In addition, NT-proBNP of <300 pg/mL  indicates ADHF is not likely.    Age Range Result Interpretation  NT-proBNP Concentration (pg/mL:      <50             Positive            >450                   Gray                 300-450                    Negative             <300    50-75           Positive            >900                  Gray                300-900                  Negative            <300      >75             Positive            >1800                  Gray                300-1800                  Negative            <300    Single High Sensitivity Troponin T [[id number]]  (Normal) Collected: 12/05/23 0250    Specimen: Blood Updated: 12/05/23 0338     HS Troponin T <6 ng/L     Narrative:      High Sensitive Troponin T Reference Range:  <14.0 ng/L- Negative Female for AMI  <22.0 ng/L- Negative Male for AMI  >=14 - Abnormal Female indicating possible myocardial injury.  >=22 - Abnormal Male indicating possible myocardial injury.   Clinicians would have to utilize clinical acumen, EKG, Troponin, and serial changes to determine if it is an Acute Myocardial Infarction or myocardial injury due to an underlying chronic condition.         CBC Auto Differential [[id number]]  (Normal) Collected: 12/05/23 0250    Specimen: Blood Updated: 12/05/23 0258     WBC 8.11 10*3/mm3      RBC 4.67 10*6/mm3      Hemoglobin 13.8 g/dL      Hematocrit 41.3 %      MCV 88.4 fL      MCH 29.6 pg      MCHC 33.4 g/dL      RDW 14.2 %      RDW-SD 45.9 fl      MPV 9.3 fL      Platelets 369 10*3/mm3      Neutrophil % 54.4 %      Lymphocyte % 34.2 %      Monocyte % 8.8 %      Eosinophil % 1.5 %      Basophil % 0.9 %      Immature Grans % 0.2 %      Neutrophils, Absolute 4.42 10*3/mm3      Lymphocytes, Absolute 2.77 10*3/mm3      Monocytes, Absolute 0.71 10*3/mm3      Eosinophils, Absolute 0.12 10*3/mm3      Basophils, Absolute 0.07 10*3/mm3      Immature Grans, Absolute 0.02 10*3/mm3      nRBC 0.0 /100 WBC     TSH [[id number]]  (Normal) Collected: 12/05/23 0250    Specimen: Blood  Updated: 12/05/23 0338     TSH 2.680 uIU/mL     T4, Free [[id number]]  (Normal) Collected: 12/05/23 0250    Specimen: Blood Updated: 12/05/23 0338     Free T4 1.36 ng/dL     Narrative:      Results may be falsely increased if patient taking Biotin.      COVID-19, FLU A/B, RSV PCR 1 HR TAT - Swab, Nasopharynx [[id number]]  (Normal) Collected: 12/05/23 0311    Specimen: Swab from Nasopharynx Updated: 12/05/23 0354     COVID19 Not Detected     Influenza A PCR Not Detected     Influenza B PCR Not Detected     RSV, PCR Not Detected    Narrative:      Fact sheet for providers: https://www.fda.gov/media/784646/download    Fact sheet for patients: https://www.fda.gov/media/112390/download    Test performed by PCR.    Urinalysis With Microscopic If Indicated (No Culture) - Urine, Clean Catch [[id number]]  (Normal) Collected: 12/05/23 0416    Specimen: Urine, Clean Catch Updated: 12/05/23 0425     Color, UA Yellow     Appearance, UA Clear     pH, UA 6.0     Specific Gravity, UA 1.011     Glucose, UA Negative     Ketones, UA Negative     Bilirubin, UA Negative     Blood, UA Negative     Protein, UA Negative     Leuk Esterase, UA Negative     Nitrite, UA Negative     Urobilinogen, UA 1.0 E.U./dL    Narrative:      Urine microscopic not indicated.    Urine Drug Screen - Urine, Clean Catch [[id number]] Collected: 12/05/23 0416    Specimen: Urine, Clean Catch Updated: 12/05/23 0420             Imaging:    XR Chest 1 View    Result Date: 12/5/2023  PROCEDURE: XR CHEST 1 VW  COMPARISON: [facility], CR, XR CHEST 1 VW, 9/25/2023, 10:46.  INDICATIONS: SOA Triage Protocol  FINDINGS:  No new consolidations or pleural effusions are observed. The cardiac silhouette and mediastinum are unchanged. No definitive acute osseous abnormalities are seen on this single view.        1. No change from the previous study with no evidence for acute cardiopulmonary process.         TIBURCIO CHURCHILL MD       Electronically Signed and Approved By:  TIBURCIO CHURCHILL MD on 12/05/2023 at 3:05                Differential Diagnosis and Discussion:    Chest Pain:  Based on the patient's signs and symptoms, I considered aortic dissection, myocardial infaction, pulmonary embolism, cardiac tamponade, pericarditis, pneumothorax, musculoskeletal chest pain and other differential diagnosis as an etiology of the patient's chest pain.   Paresthesia: Differential diagnosis includes but is not limited to acute stroke, electrolyte imbalance, anxiety, radiculopathy, autoimmune disorders, and endocrine disorders.    All labs were reviewed and interpreted by me.  All X-rays impressions were independently interpreted by me.  EKG was interpreted by me.    MDM  Number of Diagnoses or Management Options  Diagnosis management comments: Patient is afebrile nontoxic-appearing.  Vital signs are stable.  Labs show no significant abnormality.  No concerning findings on exam.  Patient has no focal deficits.  Recommend close follow-up with her primary physician.  Discussed return precautions, discharge instructions and answered all her questions.       Amount and/or Complexity of Data Reviewed  Clinical lab tests: reviewed  Tests in the radiology section of CPT®: reviewed  Review and summarize past medical records: yes  Independent visualization of images, tracings, or specimens: yes    Risk of Complications, Morbidity, and/or Mortality  Presenting problems: moderate  Management options: moderate                 Patient Care Considerations:    CT HEAD: I considered ordering a noncontrast CT of the head, however no focal deficits on exam      Consultants/Shared Management Plan:    None    Social Determinants of Health:    Patient is independent, reliable, and has access to care.       Disposition and Care Coordination:    Discharged: The patient is suitable and stable for discharge with no need for consideration of observation or admission.    I have explained the patient´s condition, diagnoses  and treatment plan based on the information available to me at this time. I have answered questions and addressed any concerns. The patient has a good  understanding of the patient´s diagnosis, condition, and treatment plan as can be expected at this point. The vital signs have been stable. The patient´s condition is stable and appropriate for discharge from the emergency department.      The patient will pursue further outpatient evaluation with the primary care physician or other designated or consulting physician as outlined in the discharge instructions. They are agreeable to this plan of care and follow-up instructions have been explained in detail. The patient has received these instructions in written format and have expressed an understanding of the discharge instructions. The patient is aware that any significant change in condition or worsening of symptoms should prompt an immediate return to this or the closest emergency department or call to 911.  I have explained discharge medications and the need for follow up with the patient/caretakers. This was also printed in the discharge instructions. Patient was discharged with the following medications and follow up:      Medication List      No changes were made to your prescriptions during this visit.      Maureen Plummer, APRDANNIELLE  [address]  [facility] [address]  [facility] [address] [phone]    In 2 days         Final diagnoses:   Paresthesias   Chest pain, unspecified type        ED Disposition       ED Disposition   Discharge    Condition   Stable    Comment   --               This medical record created using voice recognition software.             Nancy Kovacs MD  12/05/23 6559

## 2023-12-05 NOTE — Clinical Note
[facility] EMERGENCY ROOM  [address]  [place] [address]  Phone: [phone]    Bhargavi Jeronimo was seen and treated in our emergency department on 12/5/2023.  She may return to work on 12/06/2023.         Thank you for choosing [facility].    Nancy Kovacs MD

## 2023-12-20 ENCOUNTER — OFFICE VISIT (OUTPATIENT)
Dept: FAMILY MEDICINE CLINIC | Facility: CLINIC | Age: 38
End: 2023-12-20
Payer: COMMERCIAL

## 2023-12-20 VITALS
HEART RATE: 91 BPM | SYSTOLIC BLOOD PRESSURE: 137 MMHG | BODY MASS INDEX: 25.92 KG/M2 | HEIGHT: 64 IN | DIASTOLIC BLOOD PRESSURE: 83 MMHG | WEIGHT: 151.8 LBS | TEMPERATURE: 98.6 F | OXYGEN SATURATION: 99 % | RESPIRATION RATE: 18 BRPM

## 2023-12-20 DIAGNOSIS — F31.9 BIPOLAR AFFECTIVE DISORDER, REMISSION STATUS UNSPECIFIED: ICD-10-CM

## 2023-12-20 DIAGNOSIS — F17.210 CIGARETTE NICOTINE DEPENDENCE WITHOUT COMPLICATION: ICD-10-CM

## 2023-12-20 DIAGNOSIS — F41.1 GAD (GENERALIZED ANXIETY DISORDER): Primary | ICD-10-CM

## 2023-12-20 DIAGNOSIS — I10 ESSENTIAL HYPERTENSION: ICD-10-CM

## 2023-12-20 DIAGNOSIS — R51.9 NONINTRACTABLE HEADACHE, UNSPECIFIED CHRONICITY PATTERN, UNSPECIFIED HEADACHE TYPE: ICD-10-CM

## 2023-12-20 DIAGNOSIS — R42 DIZZINESS: ICD-10-CM

## 2023-12-20 PROCEDURE — 99214 OFFICE O/P EST MOD 30 MIN: CPT | Performed by: NURSE PRACTITIONER

## 2023-12-20 NOTE — ASSESSMENT & PLAN NOTE
Psychological condition is worsening.  Continue current treatment regimen.  Regular aerobic exercise.  Advised to keep fu with psychiatry tomorrow.  Discussed possible medication changes. Rec change to environment.   Psychological condition  will be reassessed at the next regular appointment.

## 2023-12-20 NOTE — PROGRESS NOTES
"Chief Complaint  discuss possible anxiety  (Pt states she started a new job in August and she states sometimes when she arrives at work she starts panicking, heart palpitations, right sided numbness, pt also states she dreams about her job and starts panicking in the middle of the night. Pt went to  12/06/2023, ED on 12/05/2023 for this.), Dizziness, and Headache    Subjective        Bhargavi Jeronimo presents to [facility]  History of Present Illness  Pt presents c/o increased anxiety, palpitations, right sided numbness, dizziness, headaches x several months. Pt does go to psychiatry, Annie Fountain at . She does have an appt with her tomorrow.     Believed to be r/t new job. 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 Does not occur otherwise.     Reviewed all recent labs and medications.      Objective   Vital Signs:  /83   Pulse 91   Temp 98.6 °F (37 °C) (Temporal)   Resp 18   Ht 162.6 cm (64\")   Wt 68.9 kg (151 lb 12.8 oz)   SpO2 99%   BMI 26.06 kg/m²   Estimated body mass index is 26.06 kg/m² as calculated from the following:    Height as of this encounter: 162.6 cm (64\").    Weight as of this encounter: 68.9 kg (151 lb 12.8 oz).               Physical Exam  Vitals reviewed.   Constitutional:       General: She is not in acute distress.  HENT:      Head: Normocephalic.      Right Ear: Tympanic membrane normal.      Left Ear: Tympanic membrane normal.      Nose: Nose normal.      Mouth/Throat:      Pharynx: Oropharynx is clear. No posterior oropharyngeal erythema.   Eyes:      General: No scleral icterus.     Extraocular Movements: Extraocular movements intact.      Conjunctiva/sclera: Conjunctivae normal.      Pupils: Pupils are equal, round, and reactive to light.   Cardiovascular:      Rate and Rhythm: Normal rate and regular rhythm.      " Pulses: Normal pulses.      Heart sounds: Normal heart sounds.   Pulmonary:      Effort: Pulmonary effort is normal.      Breath sounds: Normal breath sounds.   Abdominal:      General: Bowel sounds are normal.      Palpations: Abdomen is soft.   Musculoskeletal:         General: Normal range of motion.      Cervical back: Neck supple.   Skin:     General: Skin is warm and dry.   Neurological:      Mental Status: She is alert and oriented to person, place, and time.   Psychiatric:         Mood and Affect: Mood normal.         Behavior: Behavior normal.         Thought Content: Thought content normal.         Judgment: Judgment normal.        Result Review :                   Assessment and Plan   Diagnoses and all orders for this visit:    1. SHASHI (generalized anxiety disorder) (Primary)  Assessment & Plan:  Psychological condition is worsening.  Continue current treatment regimen.  Regular aerobic exercise.  Advised to keep fu with psychiatry tomorrow.  Discussed possible medication changes. Rec change to environment.   Psychological condition  will be reassessed at the next regular appointment.      2. Cigarette nicotine dependence without complication  Assessment & Plan:  Tobacco use is unchanged.  Smoking cessation counseling was provided.  Pharmacotherapy was prescribed as ordered.  Tobacco use will be reassessed at the next regular appointment.      3. Bipolar affective disorder, remission status unspecified  Assessment & Plan:  Psychological condition is stable.  Continue current treatment regimen.  Regular aerobic exercise.  Psychological condition  will be reassessed at the next regular appointment.      4. Dizziness    5. Nonintractable headache, unspecified chronicity pattern, unspecified headache type    6. Essential hypertension  Assessment & Plan:  Hypertension is improving with treatment.  Continue current treatment regimen.  Dietary sodium restriction.  Weight loss.  Regular aerobic exercise.  Blood  pressure will be reassessed at the next regular appointment.                 Follow Up   Return if symptoms worsen or fail to improve.  Patient was given instructions and counseling regarding her condition or for health maintenance advice. Please see specific information pulled into the AVS if appropriate.         Answers submitted by the patient for this visit:  Primary Reason for Visit (Submitted on 12/19/2023)  What is the primary reason for your visit?: Neurological Problem

## 2023-12-20 NOTE — ASSESSMENT & PLAN NOTE
Psychological condition is stable.  Continue current treatment regimen.  Regular aerobic exercise.  Psychological condition  will be reassessed at the next regular appointment.

## 2024-01-10 ENCOUNTER — TELEPHONE (OUTPATIENT)
Dept: FAMILY MEDICINE CLINIC | Facility: CLINIC | Age: 39
End: 2024-01-10
Payer: COMMERCIAL

## 2024-01-10 NOTE — TELEPHONE ENCOUNTER
Spoke with patient about [employer] and  [employer] paperwork.  This was already filled out by Annie Knox PMYAMELP.  I spoke with Maureen about filling this paperwork out, she states that Annie should fill this out for the mental and physical aspects of patients anxiety.  I called patient and explained, she understood and states the insurance company was the one asking for Maureen to fill this out as well.   She will call them and see what she can get figured out.  Patient has my contact information if she needs to reach back out about this situation.

## 2024-02-22 DIAGNOSIS — I10 ESSENTIAL HYPERTENSION: ICD-10-CM

## 2024-02-22 RX ORDER — AMLODIPINE BESYLATE 2.5 MG/1
2.5 TABLET ORAL DAILY
Qty: 30 TABLET | Refills: 2 | Status: SHIPPED | OUTPATIENT
Start: 2024-02-22

## 2024-04-05 ENCOUNTER — HOSPITAL ENCOUNTER (OUTPATIENT)
Dept: GENERAL RADIOLOGY | Facility: HOSPITAL | Age: 39
Discharge: HOME | End: 2024-04-05
Payer: COMMERCIAL

## 2024-04-05 ENCOUNTER — OFFICE VISIT (OUTPATIENT)
Dept: FAMILY MEDICINE CLINIC | Facility: CLINIC | Age: 39
End: 2024-04-05
Payer: COMMERCIAL

## 2024-04-05 VITALS
OXYGEN SATURATION: 99 % | SYSTOLIC BLOOD PRESSURE: 132 MMHG | BODY MASS INDEX: 25.43 KG/M2 | DIASTOLIC BLOOD PRESSURE: 74 MMHG | WEIGHT: 152.6 LBS | HEIGHT: 65 IN | TEMPERATURE: 97.6 F | HEART RATE: 77 BPM

## 2024-04-05 DIAGNOSIS — S93.401D SPRAIN OF RIGHT ANKLE, UNSPECIFIED LIGAMENT, SUBSEQUENT ENCOUNTER: ICD-10-CM

## 2024-04-05 DIAGNOSIS — S93.401D SPRAIN OF RIGHT ANKLE, UNSPECIFIED LIGAMENT, SUBSEQUENT ENCOUNTER: Primary | ICD-10-CM

## 2024-04-05 PROCEDURE — 99213 OFFICE O/P EST LOW 20 MIN: CPT

## 2024-04-05 PROCEDURE — 73610 X-RAY EXAM OF ANKLE: CPT

## 2024-04-05 RX ORDER — CYCLOBENZAPRINE HCL 10 MG
10 TABLET ORAL 3 TIMES DAILY PRN
Qty: 21 TABLET | Refills: 0 | Status: SHIPPED | OUTPATIENT
Start: 2024-04-05 | End: 2024-04-12

## 2024-04-05 NOTE — PROGRESS NOTES
No fracture of ankle. Decrease in tissue swelling. Likely to be a sprain that is starting to resolve

## 2024-04-05 NOTE — PROGRESS NOTES
"Chief Complaint  Ankle Injury (Right ankle, tripped heard a pop on Sunday. )    Subjective        Bhargavi Jeronimo presents to [facility]  History of Present Illness  Bhargavi is here to be seen for a right ankle injury. She tripped on Sunday and heard a pop in her foot during the trip. She got imaging and it showed no fracture. There is significant swelling and some bruising at the ankle. It is painful just to touch. She has been wearing an air cast but it has not shown improvement.       Objective   Vital Signs:  /74 (BP Location: Left arm, Patient Position: Sitting, Cuff Size: Adult)   Pulse 77   Temp 97.6 °F (36.4 °C)   Ht 165.1 cm (65\")   Wt 69.2 kg (152 lb 9.6 oz)   SpO2 99%   BMI 25.39 kg/m²   Estimated body mass index is 25.39 kg/m² as calculated from the following:    Height as of this encounter: 165.1 cm (65\").    Weight as of this encounter: 69.2 kg (152 lb 9.6 oz).             Physical Exam  Constitutional:       Appearance: Normal appearance.   HENT:      Nose: Nose normal.      Mouth/Throat:      Mouth: Mucous membranes are moist.   Cardiovascular:      Rate and Rhythm: Normal rate and regular rhythm.      Heart sounds: Normal heart sounds.   Pulmonary:      Effort: Pulmonary effort is normal.      Breath sounds: Normal breath sounds.   Musculoskeletal:         General: Swelling (right ankle) present.   Skin:     General: Skin is warm and dry.   Neurological:      General: No focal deficit present.      Mental Status: She is alert and oriented to person, place, and time.   Psychiatric:         Mood and Affect: Mood normal.         Behavior: Behavior normal.        Result Review :                     Assessment and Plan     Diagnoses and all orders for this visit:    1. Sprain of right ankle, unspecified ligament, subsequent encounter (Primary)  -     XR Ankle 3+ View Right; Future  -     cyclobenzaprine (FLEXERIL) 10 MG tablet; Take 1 tablet by mouth 3 " (Three) Times a Day As Needed (ankle pain) for up to 7 days.  Dispense: 21 tablet; Refill: 0             Follow Up     Return if symptoms worsen or fail to improve.  Patient was given instructions and counseling regarding her condition or for health maintenance advice. Please see specific information pulled into the AVS if appropriate.

## 2024-06-28 ENCOUNTER — LAB (OUTPATIENT)
Dept: LAB | Facility: HOSPITAL | Age: 39
End: 2024-06-28
Payer: COMMERCIAL

## 2024-06-28 ENCOUNTER — OFFICE VISIT (OUTPATIENT)
Dept: FAMILY MEDICINE CLINIC | Facility: CLINIC | Age: 39
End: 2024-06-28
Payer: COMMERCIAL

## 2024-06-28 VITALS
SYSTOLIC BLOOD PRESSURE: 146 MMHG | BODY MASS INDEX: 25.46 KG/M2 | HEART RATE: 75 BPM | TEMPERATURE: 97.8 F | OXYGEN SATURATION: 99 % | WEIGHT: 152.8 LBS | DIASTOLIC BLOOD PRESSURE: 99 MMHG | RESPIRATION RATE: 16 BRPM | HEIGHT: 65 IN

## 2024-06-28 DIAGNOSIS — F17.210 CIGARETTE NICOTINE DEPENDENCE WITHOUT COMPLICATION: ICD-10-CM

## 2024-06-28 DIAGNOSIS — Z20.2 POSSIBLE EXPOSURE TO STD: ICD-10-CM

## 2024-06-28 DIAGNOSIS — Z12.31 ENCOUNTER FOR SCREENING MAMMOGRAM FOR MALIGNANT NEOPLASM OF BREAST: ICD-10-CM

## 2024-06-28 DIAGNOSIS — Z01.419 WELL WOMAN EXAM: Primary | ICD-10-CM

## 2024-06-28 DIAGNOSIS — Z12.4 SCREENING FOR CERVICAL CANCER: ICD-10-CM

## 2024-06-28 DIAGNOSIS — I10 ESSENTIAL HYPERTENSION: ICD-10-CM

## 2024-06-28 DIAGNOSIS — F41.1 GAD (GENERALIZED ANXIETY DISORDER): ICD-10-CM

## 2024-06-28 LAB
C TRACH RRNA CVX QL NAA+PROBE: NOT DETECTED
CANDIDA SPECIES: POSITIVE
GARDNERELLA VAGINALIS: NEGATIVE
HBV SURFACE AG SERPL QL IA: NORMAL
HCV AB SER QL: NORMAL
HIV 1+2 AB+HIV1 P24 AG SERPL QL IA: NORMAL
N GONORRHOEA RRNA SPEC QL NAA+PROBE: NOT DETECTED
T VAGINALIS DNA VAG QL PROBE+SIG AMP: NEGATIVE

## 2024-06-28 PROCEDURE — 86696 HERPES SIMPLEX TYPE 2 TEST: CPT

## 2024-06-28 PROCEDURE — 87491 CHLMYD TRACH DNA AMP PROBE: CPT | Performed by: NURSE PRACTITIONER

## 2024-06-28 PROCEDURE — 87660 TRICHOMONAS VAGIN DIR PROBE: CPT | Performed by: NURSE PRACTITIONER

## 2024-06-28 PROCEDURE — 36415 COLL VENOUS BLD VENIPUNCTURE: CPT

## 2024-06-28 PROCEDURE — 87591 N.GONORRHOEAE DNA AMP PROB: CPT | Performed by: NURSE PRACTITIONER

## 2024-06-28 PROCEDURE — 86695 HERPES SIMPLEX TYPE 1 TEST: CPT

## 2024-06-28 PROCEDURE — G0123 SCREEN CERV/VAG THIN LAYER: HCPCS | Performed by: NURSE PRACTITIONER

## 2024-06-28 PROCEDURE — 87480 CANDIDA DNA DIR PROBE: CPT | Performed by: NURSE PRACTITIONER

## 2024-06-28 PROCEDURE — G0432 EIA HIV-1/HIV-2 SCREEN: HCPCS

## 2024-06-28 PROCEDURE — 99396 PREV VISIT EST AGE 40-64: CPT | Performed by: NURSE PRACTITIONER

## 2024-06-28 PROCEDURE — 87510 GARDNER VAG DNA DIR PROBE: CPT | Performed by: NURSE PRACTITIONER

## 2024-06-28 PROCEDURE — 86803 HEPATITIS C AB TEST: CPT

## 2024-06-28 PROCEDURE — 86592 SYPHILIS TEST NON-TREP QUAL: CPT

## 2024-06-28 PROCEDURE — 87340 HEPATITIS B SURFACE AG IA: CPT

## 2024-06-28 NOTE — PROGRESS NOTES
"Chief Complaint  Gynecologic Exam (Last pap was 3/20/2023)    Subjective        Bhargavi Jeronimo presents to [facility]  History of Present Illness  Pt presents for annual well woman exam/mammogram order. Pt with hysterectomy R oophrectomy 2021. Pt request STD testing.     Reviewed all recent labs and medications.      Objective   Vital Signs:  /99   Pulse 75   Temp 97.8 °F (36.6 °C) (Temporal)   Resp 16   Ht 165.1 cm (65\")   Wt 69.3 kg (152 lb 12.8 oz)   SpO2 99%   BMI 25.43 kg/m²   Estimated body mass index is 25.43 kg/m² as calculated from the following:    Height as of this encounter: 165.1 cm (65\").    Weight as of this encounter: 69.3 kg (152 lb 12.8 oz).               Physical Exam  Constitutional:       General: She is not in acute distress.     Appearance: Normal appearance.   HENT:      Head: Normocephalic and atraumatic.      Nose: Nose normal.   Eyes:      Extraocular Movements: Extraocular movements intact.      Conjunctiva/sclera: Conjunctivae normal.   Cardiovascular:      Rate and Rhythm: Normal rate and regular rhythm.      Pulses: Normal pulses.      Heart sounds: Normal heart sounds.   Pulmonary:      Effort: Pulmonary effort is normal.      Breath sounds: Normal breath sounds.   Abdominal:      General: Bowel sounds are normal.      Palpations: Abdomen is soft.      Hernia: There is no hernia in the left inguinal area or right inguinal area.   Genitourinary:     General: Normal vulva.      Pubic Area: No rash.       Labia:         Right: No rash, tenderness or lesion.         Left: No rash, tenderness or lesion.       Urethra: No prolapse, urethral pain, urethral swelling or urethral lesion.      Vagina: Normal. No vaginal discharge or lesions.      Uterus: Normal.       Adnexa: Right adnexa normal and left adnexa normal.      Rectum: Normal.   Skin:     General: Skin is warm and dry.   Neurological:      Mental Status: She is alert and oriented " to person, place, and time.   Psychiatric:         Mood and Affect: Mood normal.         Behavior: Behavior normal.        Result Review :      Common labs          9/25/2023    10:48 12/5/2023    02:50   Common Labs   Glucose 109  104    BUN 7  6    Creatinine 0.81  0.59    Sodium 135  135    Potassium 3.7  4.2    Chloride 98  99    Calcium 9.5  9.0    Albumin 4.6  4.5    Total Bilirubin 0.3  0.3    Alkaline Phosphatase 72  74    AST (SGOT) 20  30    ALT (SGPT) 11  23    WBC 11.54  8.11    Hemoglobin 13.0  13.8    Hematocrit 39.6  41.3    Platelets 287  369                   Assessment and Plan     Diagnoses and all orders for this visit:    1. Well woman exam (Primary)  Assessment & Plan:  Discussed age appropriate preventative counseling including all recommended screenings and immunizations, sunscreen, and seatbelt use. Written information provided to patient. All questions answered. Pt verbalized understanding.         2. Cigarette nicotine dependence without complication  Assessment & Plan:  Tobacco use is unchanged.  Smoking cessation counseling was provided.  Pharmacotherapy was prescribed as ordered.  Tobacco use will be reassessed at the next regular appointment.      3. Essential hypertension  Assessment & Plan:  Hypertension is improving with treatment.  Continue current treatment regimen.  Dietary sodium restriction.  Weight loss.  Regular aerobic exercise.  Blood pressure will be reassessed at the next regular appointment.        4. SHASHI (generalized anxiety disorder)  Assessment & Plan:  Psychological condition is improving with treatment.  Continue current treatment regimen.  Regular aerobic exercise.  Psychological condition  will be reassessed at the next regular appointment.         5. Encounter for screening mammogram for malignant neoplasm of breast  -     Mammo Screening Digital Tomosynthesis Bilateral With CAD; Future    6. Screening for cervical cancer  -     IGP, Rfx Aptima HPV ASCU;  Future    7. Possible exposure to STD  -     RPR, Rfx Qn RPR / Confirm TP; Future  -     Hepatitis B Surface Antigen; Future  -     Hepatitis C Antibody; Future  -     HIV-1 / O / 2 Ag / Antibody 4th Generation; Future  -     HSV 1 & 2 - Specific Antibody, IgG; Future  -     Gardnerella vaginalis, Trichomonas vaginalis, Candida albicans, DNA - Swab, Vagina  -     Chlamydia trachomatis, Neisseria gonorrhoeae, PCR - Swab, Endocervix; Future             Follow Up     Return if symptoms worsen or fail to improve.  Patient was given instructions and counseling regarding her condition or for health maintenance advice. Please see specific information pulled into the AVS if appropriate.         Answers submitted by the patient for this visit:  Other (Submitted on 6/26/2024)  Please describe your symptoms.: I am coming for a physical, Pap smear and STD testing  Have you had these symptoms before?: No  How long have you been having these symptoms?: 1-4 days  Please describe any probable cause for these symptoms. : I am getting old.  Primary Reason for Visit (Submitted on 6/26/2024)  What is the primary reason for your visit?: Other

## 2024-06-30 LAB
HSV1 IGG SER IA-ACNC: 22.3 INDEX (ref 0–0.9)
HSV2 IGG SER IA-ACNC: <0.91 INDEX (ref 0–0.9)
RPR SER QL: NON REACTIVE

## 2024-07-01 RX ORDER — FLUCONAZOLE 150 MG/1
150 TABLET ORAL ONCE
Qty: 1 TABLET | Refills: 0 | Status: SHIPPED | OUTPATIENT
Start: 2024-07-01 | End: 2024-07-01

## 2024-07-03 LAB
CONV .: NORMAL
CYTOLOGIST CVX/VAG CYTO: NORMAL
CYTOLOGY CVX/VAG DOC CYTO: NORMAL
CYTOLOGY CVX/VAG DOC THIN PREP: NORMAL
DX ICD CODE: NORMAL
Lab: NORMAL
Lab: NORMAL
OTHER STN SPEC: NORMAL
STAT OF ADQ CVX/VAG CYTO-IMP: NORMAL

## 2024-07-25 PROCEDURE — 87660 TRICHOMONAS VAGIN DIR PROBE: CPT | Performed by: NURSE PRACTITIONER

## 2024-07-25 PROCEDURE — 87510 GARDNER VAG DNA DIR PROBE: CPT | Performed by: NURSE PRACTITIONER

## 2024-07-25 PROCEDURE — 87480 CANDIDA DNA DIR PROBE: CPT | Performed by: NURSE PRACTITIONER

## 2024-07-26 ENCOUNTER — TELEPHONE (OUTPATIENT)
Dept: URGENT CARE | Facility: CLINIC | Age: 39
End: 2024-07-26
Payer: COMMERCIAL

## 2024-07-26 NOTE — TELEPHONE ENCOUNTER
----- Message from Martina Boyer sent at 7/26/2024  7:56 AM EDT -----  Please call the patient regarding her abnormal result.  Advise patient she was treated appropriately with fluconazole at visit.  Recommend follow-up with primary care provider if symptoms fail to improve.

## 2024-08-02 ENCOUNTER — LAB (OUTPATIENT)
Dept: LAB | Facility: HOSPITAL | Age: 39
End: 2024-08-02
Payer: COMMERCIAL

## 2024-08-02 ENCOUNTER — HOSPITAL ENCOUNTER (OUTPATIENT)
Dept: GENERAL RADIOLOGY | Facility: HOSPITAL | Age: 39
Discharge: HOME | End: 2024-08-02
Payer: COMMERCIAL

## 2024-08-02 ENCOUNTER — OFFICE VISIT (OUTPATIENT)
Dept: FAMILY MEDICINE CLINIC | Facility: CLINIC | Age: 39
End: 2024-08-02
Payer: COMMERCIAL

## 2024-08-02 VITALS
HEIGHT: 65 IN | OXYGEN SATURATION: 100 % | BODY MASS INDEX: 25.16 KG/M2 | HEART RATE: 90 BPM | WEIGHT: 151 LBS | RESPIRATION RATE: 18 BRPM | TEMPERATURE: 97.2 F | DIASTOLIC BLOOD PRESSURE: 72 MMHG | SYSTOLIC BLOOD PRESSURE: 129 MMHG

## 2024-08-02 DIAGNOSIS — I10 ESSENTIAL HYPERTENSION: ICD-10-CM

## 2024-08-02 DIAGNOSIS — Z00.00 ANNUAL PHYSICAL EXAM: ICD-10-CM

## 2024-08-02 DIAGNOSIS — F31.9 BIPOLAR AFFECTIVE DISORDER, REMISSION STATUS UNSPECIFIED: ICD-10-CM

## 2024-08-02 DIAGNOSIS — R63.4 WEIGHT LOSS, UNINTENTIONAL: ICD-10-CM

## 2024-08-02 DIAGNOSIS — F17.210 CIGARETTE NICOTINE DEPENDENCE WITHOUT COMPLICATION: Primary | ICD-10-CM

## 2024-08-02 DIAGNOSIS — F41.1 GAD (GENERALIZED ANXIETY DISORDER): ICD-10-CM

## 2024-08-02 DIAGNOSIS — R20.0 LEFT LEG NUMBNESS: ICD-10-CM

## 2024-08-02 LAB
ALBUMIN SERPL-MCNC: 4.5 G/DL (ref 3.5–5.2)
ALBUMIN/GLOB SERPL: 1.3 G/DL
ALP SERPL-CCNC: 80 U/L (ref 39–117)
ALT SERPL W P-5'-P-CCNC: 20 U/L (ref 1–33)
ANION GAP SERPL CALCULATED.3IONS-SCNC: 11.3 MMOL/L (ref 5–15)
AST SERPL-CCNC: 32 U/L (ref 1–32)
BASOPHILS # BLD AUTO: 0.08 10*3/MM3 (ref 0–0.2)
BASOPHILS NFR BLD AUTO: 0.8 % (ref 0–1.5)
BILIRUB SERPL-MCNC: 0.2 MG/DL (ref 0–1.2)
BUN SERPL-MCNC: 16 MG/DL (ref 6–20)
BUN/CREAT SERPL: 20 (ref 7–25)
CALCIUM SPEC-SCNC: 9.5 MG/DL (ref 8.6–10.5)
CHLORIDE SERPL-SCNC: 101 MMOL/L (ref 98–107)
CHOLEST SERPL-MCNC: 202 MG/DL (ref 0–200)
CO2 SERPL-SCNC: 25.7 MMOL/L (ref 22–29)
CREAT SERPL-MCNC: 0.8 MG/DL (ref 0.57–1)
DEPRECATED RDW RBC AUTO: 50.4 FL (ref 37–54)
EGFRCR SERPLBLD CKD-EPI 2021: 95.7 ML/MIN/1.73
EOSINOPHIL # BLD AUTO: 0.15 10*3/MM3 (ref 0–0.4)
EOSINOPHIL NFR BLD AUTO: 1.5 % (ref 0.3–6.2)
ERYTHROCYTE [DISTWIDTH] IN BLOOD BY AUTOMATED COUNT: 14.9 % (ref 12.3–15.4)
GLOBULIN UR ELPH-MCNC: 3.6 GM/DL
GLUCOSE SERPL-MCNC: 76 MG/DL (ref 65–99)
HCT VFR BLD AUTO: 40.9 % (ref 34–46.6)
HDLC SERPL-MCNC: 97 MG/DL (ref 40–60)
HGB BLD-MCNC: 13.6 G/DL (ref 12–15.9)
IMM GRANULOCYTES # BLD AUTO: 0.02 10*3/MM3 (ref 0–0.05)
IMM GRANULOCYTES NFR BLD AUTO: 0.2 % (ref 0–0.5)
LDLC SERPL CALC-MCNC: 89 MG/DL (ref 0–100)
LDLC/HDLC SERPL: 0.89 {RATIO}
LYMPHOCYTES # BLD AUTO: 4.58 10*3/MM3 (ref 0.7–3.1)
LYMPHOCYTES NFR BLD AUTO: 44.9 % (ref 19.6–45.3)
MCH RBC QN AUTO: 30.6 PG (ref 26.6–33)
MCHC RBC AUTO-ENTMCNC: 33.3 G/DL (ref 31.5–35.7)
MCV RBC AUTO: 92.1 FL (ref 79–97)
MONOCYTES # BLD AUTO: 0.98 10*3/MM3 (ref 0.1–0.9)
MONOCYTES NFR BLD AUTO: 9.6 % (ref 5–12)
NEUTROPHILS NFR BLD AUTO: 4.39 10*3/MM3 (ref 1.7–7)
NEUTROPHILS NFR BLD AUTO: 43 % (ref 42.7–76)
NRBC BLD AUTO-RTO: 0 /100 WBC (ref 0–0.2)
PLATELET # BLD AUTO: 323 10*3/MM3 (ref 140–450)
PMV BLD AUTO: 10 FL (ref 6–12)
POTASSIUM SERPL-SCNC: 4 MMOL/L (ref 3.5–5.2)
PROT SERPL-MCNC: 8.1 G/DL (ref 6–8.5)
RBC # BLD AUTO: 4.44 10*6/MM3 (ref 3.77–5.28)
SODIUM SERPL-SCNC: 138 MMOL/L (ref 136–145)
TRIGL SERPL-MCNC: 92 MG/DL (ref 0–150)
TSH SERPL DL<=0.05 MIU/L-ACNC: 1.18 UIU/ML (ref 0.27–4.2)
VLDLC SERPL-MCNC: 16 MG/DL (ref 5–40)
WBC NRBC COR # BLD AUTO: 10.2 10*3/MM3 (ref 3.4–10.8)

## 2024-08-02 PROCEDURE — 73590 X-RAY EXAM OF LOWER LEG: CPT

## 2024-08-02 PROCEDURE — 36415 COLL VENOUS BLD VENIPUNCTURE: CPT

## 2024-08-02 PROCEDURE — 80050 GENERAL HEALTH PANEL: CPT

## 2024-08-02 PROCEDURE — 73610 X-RAY EXAM OF ANKLE: CPT

## 2024-08-02 PROCEDURE — 99396 PREV VISIT EST AGE 40-64: CPT | Performed by: NURSE PRACTITIONER

## 2024-08-02 PROCEDURE — 80061 LIPID PANEL: CPT

## 2024-08-02 NOTE — PROGRESS NOTES
"Chief Complaint  Numbness (Numbness and tingling on the left leg.) and Weight Loss (Losing weight, not dieting.)    Subjective        Bhargavi Jeronimo presents to [facility]  History of Present Illness  Pt presents annual PE/labs. Pt c/o unintentional weight loss over the past year 15-20 lbs. Denies cough, night sweats, fever, chills, SOB, chest pain or other. Pt c/o L lower leg numbness and tingling x 1 month. Pt with hx of left ankle sx 2005 with hardware placement plate, screws. No recent injuries or falls.     Reviewed all recent labs and medications.      Objective   Vital Signs:  /72 (BP Location: Right arm, Patient Position: Sitting, Cuff Size: Adult)   Pulse 90   Temp 97.2 °F (36.2 °C) (Temporal)   Resp 18   Ht 165.1 cm (65\")   Wt 68.5 kg (151 lb)   SpO2 100%   BMI 25.13 kg/m²   Estimated body mass index is 25.13 kg/m² as calculated from the following:    Height as of this encounter: 165.1 cm (65\").    Weight as of this encounter: 68.5 kg (151 lb).               Physical Exam  Vitals reviewed.   Constitutional:       General: She is not in acute distress.     Appearance: Normal appearance.   HENT:      Head: Normocephalic.      Right Ear: Tympanic membrane normal.      Left Ear: Tympanic membrane normal.      Nose: Nose normal.      Mouth/Throat:      Pharynx: Oropharynx is clear. No posterior oropharyngeal erythema.   Eyes:      General: No scleral icterus.     Extraocular Movements: Extraocular movements intact.      Conjunctiva/sclera: Conjunctivae normal.      Pupils: Pupils are equal, round, and reactive to light.   Cardiovascular:      Rate and Rhythm: Normal rate and regular rhythm.      Pulses: Normal pulses.      Heart sounds: Normal heart sounds.   Pulmonary:      Effort: Pulmonary effort is normal.      Breath sounds: Normal breath sounds.   Abdominal:      General: Bowel sounds are normal.      Palpations: Abdomen is soft.   Musculoskeletal:       "   General: Normal range of motion.      Cervical back: Neck supple.      Left lower leg: No swelling, deformity, lacerations, tenderness or bony tenderness. No edema.   Skin:     General: Skin is warm and dry.   Neurological:      Mental Status: She is alert and oriented to person, place, and time.   Psychiatric:         Mood and Affect: Mood normal.         Behavior: Behavior normal.         Thought Content: Thought content normal.         Judgment: Judgment normal.        Result Review :      Common labs          9/25/2023    10:48 12/5/2023    02:50   Common Labs   Glucose 109  104    BUN 7  6    Creatinine 0.81  0.59    Sodium 135  135    Potassium 3.7  4.2    Chloride 98  99    Calcium 9.5  9.0    Albumin 4.6  4.5    Total Bilirubin 0.3  0.3    Alkaline Phosphatase 72  74    AST (SGOT) 20  30    ALT (SGPT) 11  23    WBC 11.54  8.11    Hemoglobin 13.0  13.8    Hematocrit 39.6  41.3    Platelets 287  369                   Assessment and Plan     Diagnoses and all orders for this visit:    1. Cigarette nicotine dependence without complication (Primary)  Assessment & Plan:  Tobacco use is unchanged.  Smoking cessation counseling was provided.  Pharmacotherapy was prescribed as ordered.  Tobacco use will be reassessed at the next regular appointment.      2. Essential hypertension  Assessment & Plan:  Hypertension is improving with treatment.  Continue current treatment regimen.  Dietary sodium restriction.  Weight loss.  Regular aerobic exercise.  Blood pressure will be reassessed at the next regular appointment.        3. Annual physical exam  Assessment & Plan:  Discussed age appropriate preventative counseling including all recommended screenings and immunizations, sunscreen, and seatbelt use. Written information provided to patient. All questions answered. Pt verbalized understanding.       Orders:  -     Comprehensive Metabolic Panel; Future  -     CBC & Differential; Future  -     TSH; Future  -     Lipid  Panel; Future    4. Bipolar affective disorder, remission status unspecified  Assessment & Plan:  Psychological condition is stable.  Continue current treatment regimen.  Regular aerobic exercise.  Psychological condition  will be reassessed at the next regular appointment.      5. Left leg numbness  Assessment & Plan:  Order for XR   May need MRI   Elevation of extremity   Consider PT     Orders:  -     XR Tibia Fibula 2 View Left; Future  -     XR Ankle 3+ View Left; Future    6. SHASHI (generalized anxiety disorder)  Assessment & Plan:  Psychological condition is improving with treatment.  Continue current treatment regimen.  Regular aerobic exercise.  Psychological condition  will be reassessed at the next regular appointment.         7. Weight loss, unintentional  Assessment & Plan:  Order for labs   Disc possible causes including anxiety, medication changes and other                Follow Up     Return if symptoms worsen or fail to improve.  Patient was given instructions and counseling regarding her condition or for health maintenance advice. Please see specific information pulled into the AVS if appropriate.

## 2024-09-04 ENCOUNTER — HOSPITAL ENCOUNTER (OUTPATIENT)
Dept: MAMMOGRAPHY | Facility: HOSPITAL | Age: 39
Discharge: HOME | End: 2024-09-04
Admitting: NURSE PRACTITIONER
Payer: COMMERCIAL

## 2024-09-04 DIAGNOSIS — Z12.31 ENCOUNTER FOR SCREENING MAMMOGRAM FOR MALIGNANT NEOPLASM OF BREAST: ICD-10-CM

## 2024-09-04 PROCEDURE — 77063 BREAST TOMOSYNTHESIS BI: CPT

## 2024-09-04 PROCEDURE — 77067 SCR MAMMO BI INCL CAD: CPT

## 2024-09-11 DIAGNOSIS — I10 ESSENTIAL HYPERTENSION: ICD-10-CM

## 2024-09-11 RX ORDER — AMLODIPINE BESYLATE 2.5 MG/1
2.5 TABLET ORAL DAILY
Qty: 30 TABLET | Refills: 1 | Status: SHIPPED | OUTPATIENT
Start: 2024-09-11

## 2024-11-06 ENCOUNTER — OFFICE VISIT (OUTPATIENT)
Dept: FAMILY MEDICINE CLINIC | Facility: CLINIC | Age: 39
End: 2024-11-06
Payer: COMMERCIAL

## 2024-11-06 VITALS
DIASTOLIC BLOOD PRESSURE: 75 MMHG | OXYGEN SATURATION: 97 % | SYSTOLIC BLOOD PRESSURE: 144 MMHG | HEIGHT: 65 IN | BODY MASS INDEX: 25.83 KG/M2 | HEART RATE: 104 BPM | WEIGHT: 155 LBS | RESPIRATION RATE: 18 BRPM | TEMPERATURE: 98.7 F

## 2024-11-06 DIAGNOSIS — F31.9 BIPOLAR AFFECTIVE DISORDER, REMISSION STATUS UNSPECIFIED: ICD-10-CM

## 2024-11-06 DIAGNOSIS — F41.1 GAD (GENERALIZED ANXIETY DISORDER): ICD-10-CM

## 2024-11-06 DIAGNOSIS — R50.9 FEVER, UNSPECIFIED FEVER CAUSE: ICD-10-CM

## 2024-11-06 DIAGNOSIS — F17.210 CIGARETTE NICOTINE DEPENDENCE WITHOUT COMPLICATION: ICD-10-CM

## 2024-11-06 DIAGNOSIS — I10 ESSENTIAL HYPERTENSION: ICD-10-CM

## 2024-11-06 DIAGNOSIS — R30.0 DYSURIA: Primary | ICD-10-CM

## 2024-11-06 DIAGNOSIS — J10.1 INFLUENZA B: ICD-10-CM

## 2024-11-06 LAB
BILIRUB BLD-MCNC: ABNORMAL MG/DL
CLARITY, POC: CLEAR
COLOR UR: YELLOW
EXPIRATION DATE: ABNORMAL
EXPIRATION DATE: ABNORMAL
FLUAV AG UPPER RESP QL IA.RAPID: NOT DETECTED
FLUBV AG UPPER RESP QL IA.RAPID: DETECTED
GLUCOSE UR STRIP-MCNC: NEGATIVE MG/DL
INTERNAL CONTROL: ABNORMAL
KETONES UR QL: ABNORMAL
LEUKOCYTE EST, POC: NEGATIVE
Lab: ABNORMAL
Lab: ABNORMAL
NITRITE UR-MCNC: NEGATIVE MG/ML
PH UR: 6 [PH] (ref 5–8)
PROT UR STRIP-MCNC: ABNORMAL MG/DL
RBC # UR STRIP: NEGATIVE /UL
SARS-COV-2 AG UPPER RESP QL IA.RAPID: NOT DETECTED
SP GR UR: 1.02 (ref 1–1.03)
UROBILINOGEN UR QL: NORMAL

## 2024-11-06 PROCEDURE — 99214 OFFICE O/P EST MOD 30 MIN: CPT | Performed by: NURSE PRACTITIONER

## 2024-11-06 PROCEDURE — 81003 URINALYSIS AUTO W/O SCOPE: CPT | Performed by: NURSE PRACTITIONER

## 2024-11-06 PROCEDURE — 87428 SARSCOV & INF VIR A&B AG IA: CPT | Performed by: NURSE PRACTITIONER

## 2024-11-06 RX ORDER — OSELTAMIVIR PHOSPHATE 75 MG/1
75 CAPSULE ORAL 2 TIMES DAILY
Qty: 10 CAPSULE | Refills: 0 | Status: SHIPPED | OUTPATIENT
Start: 2024-11-06 | End: 2024-11-11

## 2024-11-06 NOTE — PROGRESS NOTES
Chief Complaint     Cough (X1 day), Fever (X1 days), and Back Pain (X3 days, lower left side.)    History of Present Illness     Bhargavi Jeronimo is a 39 y.o. female who presents to [facility] for evaluation of cough, fever, low back pain, myalgia, HA x 1 days. Has not taken anything to treat. No known sick contacts, but pt is a . Denies SOB, chest pain, n/v/d/c, sore throat or other.            History      Past Medical History:   Diagnosis Date    Allergic     Anemia     Anxiety     Arthritis     Asthma     Cataract     Cholelithiasis     Depression     Hypertension     Intermediate uveitis     Migraine     sinus trouble        Past Surgical History:   Procedure Laterality Date    CHOLECYSTECTOMY  2006    CYSTOSCOPY N/A 06/08/2021    Procedure: CYSTOSCOPY;  Surgeon: Cliff Pillai MD;  Location: [facility];  Service: Obstetrics/Gynecology;  Laterality: N/A;    DIAGNOSTIC LAPAROSCOPY TOTAL ABDOMINAL HYSTERECTOMY N/A 06/08/2021    Procedure: DIAGNOSTIC LAPAROSCOPY TOTAL ABDOMINAL HYSTERECTOMY, BILATERAL SALPINGECTOMY;  Surgeon: Cliff Pillai MD;  Location: [facility];  Service: Obstetrics/Gynecology;  Laterality: N/A;    EYE SURGERY      HYSTERECTOMY      pt states she has the left ovary    LAPAROSCOPIC LYSIS OF ADHESIONS N/A 06/30/2021    Procedure: LAPAROSCOPIC LYSIS OF ADHESIONS;  Surgeon: Cliff Pillai MD;  Location: [facility];  Service: Gynecology;  Laterality: N/A;    OOPHORECTOMY Right 06/30/2021    Procedure: OOPHORECTOMY;  Surgeon: Cliff Pillai MD;  Location: [facility];  Service: Obstetrics/Gynecology;  Laterality: Right;    ORIF ANKLE FRACTURE Left        Family History   Problem Relation Age of Onset    Other Mother     Fibroids Mother     Alcohol abuse Father     Cancer Maternal Aunt     Cancer Maternal Grandmother     Diabetes Paternal Grandfather     Cancer Paternal Grandfather     [place]  Hyperthermia Neg Hx         Current Medications        Current Outpatient Medications:     amLODIPine (NORVASC) 2.5 MG tablet, TAKE 1 TABLET BY MOUTH ONCE DAILY FOR BLOOD PRESSURE, Disp: 30 tablet, Rfl: 1    buPROPion XL (WELLBUTRIN XL) 300 MG 24 hr tablet, Take 1 tablet by mouth Every Morning., Disp: , Rfl:     busPIRone (BUSPAR) 15 MG tablet, Take 1 tablet by mouth 3 (Three) Times a Day. Usually take at night x1, Disp: , Rfl:     cetirizine (zyrTEC) 10 MG tablet, Take 1 tablet by mouth Daily., Disp: , Rfl:     dorzolamide-timolol (COSOPT) 2-0.5 % ophthalmic solution, , Disp: , Rfl:     fluticasone (FLONASE) 50 MCG/ACT nasal spray, 2 sprays into the nostril(s) as directed by provider Daily. (Patient taking differently: Administer 2 sprays into the nostril(s) as directed by provider As Needed.), Disp: 1 g, Rfl: 5    Hyrimoz 40 MG/0.4ML solution auto-injector, , Disp: , Rfl:     lamoTRIgine (LaMICtal) 100 MG tablet, Take 1.5 tablets by mouth 2 (Two) Times a Day., Disp: , Rfl:     melatonin 5 MG tablet tablet, Take 1 tablet by mouth., Disp: , Rfl:     multivitamin with minerals tablet tablet, Take 1 tablet by mouth Daily., Disp: , Rfl:     prazosin (MINIPRESS) 1 MG capsule, , Disp: , Rfl:     prednisoLONE acetate (PRED FORTE) 1 % ophthalmic suspension, , Disp: , Rfl:     traZODone (DESYREL) 150 MG tablet, , Disp: , Rfl:     oseltamivir (Tamiflu) 75 MG capsule, Take 1 capsule by mouth 2 (Two) Times a Day for 5 days., Disp: 10 capsule, Rfl: 0     Allergies     Allergies   Allergen Reactions    Latex Hives     Tree nuts and peanuts    Nuts Anaphylaxis       Social History       Social History     Social History Narrative    Not on file       Immunizations     Immunization:  Immunization History   Administered Date(s) Administered    COVID-19 (MODERNA) 1st,2nd,3rd Dose Monovalent 04/27/2021, 05/25/2021    COVID-19 (MODERNA) BIVALENT 12+YRS 10/14/2022    PPD Test 07/10/2023, 07/19/2023    Td (TDVAX) 08/20/1997     "      Objective     Objective     Vital Signs:   /75   Pulse 104   Temp 98.7 °F (37.1 °C) (Temporal)   Resp 18   Ht 165.1 cm (65\")   Wt 70.3 kg (155 lb)   SpO2 97%   BMI 25.79 kg/m²       Physical Exam  Vitals reviewed.   Constitutional:       General: She is not in acute distress.     Appearance: She is ill-appearing.   HENT:      Head: Normocephalic.      Right Ear: Tympanic membrane normal.      Left Ear: Tympanic membrane normal.      Nose: Nose normal. Congestion present.      Mouth/Throat:      Pharynx: Oropharynx is clear. No posterior oropharyngeal erythema.   Eyes:      General: No scleral icterus.     Extraocular Movements: Extraocular movements intact.      Conjunctiva/sclera: Conjunctivae normal.      Pupils: Pupils are equal, round, and reactive to light.   Cardiovascular:      Rate and Rhythm: Normal rate and regular rhythm.      Pulses: Normal pulses.      Heart sounds: Normal heart sounds.   Pulmonary:      Effort: Pulmonary effort is normal.      Breath sounds: Normal breath sounds.   Abdominal:      General: Bowel sounds are normal.      Palpations: Abdomen is soft.   Musculoskeletal:         General: Normal range of motion.      Cervical back: Neck supple.      Lumbar back: Tenderness present.   Skin:     General: Skin is warm and dry.   Neurological:      Mental Status: She is alert and oriented to person, place, and time.   Psychiatric:         Mood and Affect: Mood normal.         Behavior: Behavior normal.         Thought Content: Thought content normal.         Judgment: Judgment normal.         Results      Result Review :   The following data was reviewed by: ZEKE Her on 11/06/2024:  Common labs          12/5/2023    02:50 8/2/2024    15:01   Common Labs   Glucose 104  76    BUN 6  16    Creatinine 0.59  0.80    Sodium 135  138    Potassium 4.2  4.0    Chloride 99  101    Calcium 9.0  9.5    Albumin 4.5  4.5    Total Bilirubin 0.3  0.2    Alkaline " Phosphatase 74  80    AST (SGOT) 30  32    ALT (SGPT) 23  20    WBC 8.11  10.20    Hemoglobin 13.8  13.6    Hematocrit 41.3  40.9    Platelets 369  323    Total Cholesterol  202    Triglycerides  92    HDL Cholesterol  97    LDL Cholesterol   89      Radiologic studies mammo 9/2/24       Assessment and Plan        Assessment and Plan    Diagnoses and all orders for this visit:    1. Dysuria (Primary)  -     POCT urinalysis dipstick, automated    2. Fever, unspecified fever cause  -     POCT SARS-CoV-2 Antigen MECHELLE + Flu    3. Cigarette nicotine dependence without complication  Assessment & Plan:  Tobacco use is unchanged.  Smoking cessation counseling was provided.  Pharmacotherapy was prescribed as ordered.  Tobacco use will be reassessed at the next regular appointment.      4. Bipolar affective disorder, remission status unspecified  Assessment & Plan:  Psychological condition is stable.  Continue current treatment regimen.  Regular aerobic exercise.  Psychological condition  will be reassessed at the next regular appointment.      5. Essential hypertension  Assessment & Plan:  Hypertension is improving with treatment.  Continue current treatment regimen.  Dietary sodium restriction.  Weight loss.  Regular aerobic exercise.  Blood pressure will be reassessed at the next regular appointment.        6. SHASHI (generalized anxiety disorder)  Assessment & Plan:  Psychological condition is improving with treatment.  Continue current treatment regimen.  Regular aerobic exercise.  Psychological condition  will be reassessed at the next regular appointment.         7. Influenza B  Assessment & Plan:  Tamiflu 75mg PO bid x 5 day  Increase rest/clear fluids   Tyl/ibu UAD prn pain/fever/myalgia  Freq hand washing   May return to school on Monday       Other orders  -     oseltamivir (Tamiflu) 75 MG capsule; Take 1 capsule by mouth 2 (Two) Times a Day for 5 days.  Dispense: 10 capsule; Refill: 0              Follow Up         Follow Up   Return if symptoms worsen or fail to improve.  Patient was given instructions and counseling regarding her condition or for health maintenance advice. Please see specific information pulled into the AVS if appropriate.

## 2024-11-06 NOTE — ASSESSMENT & PLAN NOTE
Tamiflu 75mg PO bid x 5 day  Increase rest/clear fluids   Tyl/ibu UAD prn pain/fever/myalgia  Freq hand washing   May return to school on Monday

## 2024-12-02 ENCOUNTER — TRANSCRIBE ORDERS (OUTPATIENT)
Dept: GENERAL RADIOLOGY | Facility: HOSPITAL | Age: 39
End: 2024-12-02
Payer: COMMERCIAL

## 2024-12-02 DIAGNOSIS — R05.9 COUGH, UNSPECIFIED TYPE: Primary | ICD-10-CM

## 2024-12-16 DIAGNOSIS — I10 ESSENTIAL HYPERTENSION: ICD-10-CM

## 2024-12-16 RX ORDER — AMLODIPINE BESYLATE 2.5 MG/1
2.5 TABLET ORAL DAILY
Qty: 30 TABLET | Refills: 0 | Status: SHIPPED | OUTPATIENT
Start: 2024-12-16

## 2025-03-03 DIAGNOSIS — I10 ESSENTIAL HYPERTENSION: ICD-10-CM

## 2025-03-03 RX ORDER — AMLODIPINE BESYLATE 2.5 MG/1
2.5 TABLET ORAL DAILY
Qty: 30 TABLET | Refills: 0 | Status: SHIPPED | OUTPATIENT
Start: 2025-03-03

## 2025-04-28 DIAGNOSIS — I10 ESSENTIAL HYPERTENSION: ICD-10-CM

## 2025-04-28 RX ORDER — AMLODIPINE BESYLATE 2.5 MG/1
2.5 TABLET ORAL DAILY
Qty: 30 TABLET | Refills: 0 | Status: SHIPPED | OUTPATIENT
Start: 2025-04-28

## 2025-06-02 DIAGNOSIS — I10 ESSENTIAL HYPERTENSION: ICD-10-CM

## 2025-06-02 RX ORDER — AMLODIPINE BESYLATE 2.5 MG/1
2.5 TABLET ORAL DAILY
Qty: 30 TABLET | Refills: 0 | Status: SHIPPED | OUTPATIENT
Start: 2025-06-02

## 2025-06-27 DIAGNOSIS — I10 ESSENTIAL HYPERTENSION: ICD-10-CM

## 2025-06-27 RX ORDER — AMLODIPINE BESYLATE 2.5 MG/1
2.5 TABLET ORAL DAILY
Qty: 30 TABLET | Refills: 0 | Status: SHIPPED | OUTPATIENT
Start: 2025-06-27

## 2025-07-28 ENCOUNTER — OFFICE VISIT (OUTPATIENT)
Dept: FAMILY MEDICINE CLINIC | Facility: CLINIC | Age: 40
End: 2025-07-28
Payer: COMMERCIAL

## 2025-07-28 VITALS
DIASTOLIC BLOOD PRESSURE: 91 MMHG | RESPIRATION RATE: 17 BRPM | WEIGHT: 151.2 LBS | SYSTOLIC BLOOD PRESSURE: 130 MMHG | BODY MASS INDEX: 25.19 KG/M2 | HEART RATE: 79 BPM | HEIGHT: 65 IN | OXYGEN SATURATION: 100 % | TEMPERATURE: 98.6 F

## 2025-07-28 DIAGNOSIS — Z11.3 SCREENING FOR STD (SEXUALLY TRANSMITTED DISEASE): ICD-10-CM

## 2025-07-28 DIAGNOSIS — F17.210 CIGARETTE NICOTINE DEPENDENCE WITHOUT COMPLICATION: ICD-10-CM

## 2025-07-28 DIAGNOSIS — Z13.29 SCREENING FOR THYROID DISORDER: ICD-10-CM

## 2025-07-28 DIAGNOSIS — Z13.220 SCREENING FOR LIPID DISORDERS: ICD-10-CM

## 2025-07-28 DIAGNOSIS — F41.1 GAD (GENERALIZED ANXIETY DISORDER): ICD-10-CM

## 2025-07-28 DIAGNOSIS — Z01.419 WOMEN'S ANNUAL ROUTINE GYNECOLOGICAL EXAMINATION: Primary | ICD-10-CM

## 2025-07-28 DIAGNOSIS — Z12.31 ENCOUNTER FOR SCREENING MAMMOGRAM FOR MALIGNANT NEOPLASM OF BREAST: ICD-10-CM

## 2025-07-28 DIAGNOSIS — I10 ESSENTIAL HYPERTENSION: ICD-10-CM

## 2025-07-28 LAB
BACTERIAL VAGINOSIS VAG-IMP: NEGATIVE
CANDIDA DNA VAG QL NAA+PROBE: DETECTED
CANDIDA DNA VAG QL NAA+PROBE: NOT DETECTED
T VAGINALIS DNA VAG QL NAA+PROBE: NOT DETECTED

## 2025-07-28 PROCEDURE — 81515 NFCT DS BV&VAGINITIS DNA ALG: CPT | Performed by: NURSE PRACTITIONER

## 2025-07-28 PROCEDURE — G0123 SCREEN CERV/VAG THIN LAYER: HCPCS | Performed by: NURSE PRACTITIONER

## 2025-07-28 PROCEDURE — 99396 PREV VISIT EST AGE 40-64: CPT | Performed by: NURSE PRACTITIONER

## 2025-07-28 NOTE — PROGRESS NOTES
Chief Complaint     Gynecologic Exam    History of Present Illness     Bhargavi Jeronimo is a 39 y.o. female who presents to [facility] for evaluation of .      Last pap June 28, 2024, no recent LMP, Last mammo Sept 2024    History of Present Illness  The patient is a 39-year-old female who presents for a Pap smear and lab work.    She had a mammogram in September 2024.          History      Past Medical History:   Diagnosis Date    Allergic     Anemia     Anxiety     Arthritis     Asthma     Cataract     Cholelithiasis     Depression     Hypertension     Intermediate uveitis     Migraine     sinus trouble        Past Surgical History:   Procedure Laterality Date    CHOLECYSTECTOMY  2006    CYSTOSCOPY N/A 06/08/2021    Procedure: CYSTOSCOPY;  Surgeon: Cliff Pillai MD;  Location: [facility] OR;  Service: Obstetrics/Gynecology;  Laterality: N/A;    DIAGNOSTIC LAPAROSCOPY TOTAL ABDOMINAL HYSTERECTOMY N/A 06/08/2021    Procedure: DIAGNOSTIC LAPAROSCOPY TOTAL ABDOMINAL HYSTERECTOMY, BILATERAL SALPINGECTOMY;  Surgeon: Cliff Pillai MD;  Location: [facility] OR;  Service: Obstetrics/Gynecology;  Laterality: N/A;    EYE SURGERY      HYSTERECTOMY      pt states she has the left ovary    LAPAROSCOPIC LYSIS OF ADHESIONS N/A 06/30/2021    Procedure: LAPAROSCOPIC LYSIS OF ADHESIONS;  Surgeon: Cliff Pillai MD;  Location: [facility];  Service: Gynecology;  Laterality: N/A;    OOPHORECTOMY Right 06/30/2021    Procedure: OOPHORECTOMY;  Surgeon: Cliff Pillai MD;  Location: [facility] OR;  Service: Obstetrics/Gynecology;  Laterality: Right;    ORIF ANKLE FRACTURE Left        Family History   Problem Relation Age of Onset    Other Mother     Fibroids Mother     Alcohol abuse Father     Cancer Maternal Aunt     Cancer Maternal Grandmother     Diabetes Paternal Grandfather     Cancer Paternal Grandfather     Malig Hyperthermia Neg Hx         Current  "Medications        Current Outpatient Medications:     amLODIPine (NORVASC) 2.5 MG tablet, Take 1 tablet by mouth Daily. for blood pressure, Disp: 30 tablet, Rfl: 0    buPROPion XL (WELLBUTRIN XL) 300 MG 24 hr tablet, Take 1 tablet by mouth Every Morning., Disp: , Rfl:     busPIRone (BUSPAR) 15 MG tablet, Take 1 tablet by mouth 3 (Three) Times a Day. Usually take at night x1, Disp: , Rfl:     cetirizine (zyrTEC) 10 MG tablet, Take 1 tablet by mouth Daily., Disp: , Rfl:     dorzolamide-timolol (COSOPT) 2-0.5 % ophthalmic solution, , Disp: , Rfl:     fluticasone (FLONASE) 50 MCG/ACT nasal spray, 2 sprays into the nostril(s) as directed by provider Daily. (Patient taking differently: Administer 2 sprays into the nostril(s) as directed by provider As Needed.), Disp: 1 g, Rfl: 5    Hyrimoz 40 MG/0.4ML solution auto-injector, , Disp: , Rfl:     lamoTRIgine (LaMICtal) 100 MG tablet, Take 1.5 tablets by mouth 2 (Two) Times a Day., Disp: , Rfl:     melatonin 5 MG tablet tablet, Take 1 tablet by mouth., Disp: , Rfl:     multivitamin with minerals tablet tablet, Take 1 tablet by mouth Daily., Disp: , Rfl:     prednisoLONE acetate (PRED FORTE) 1 % ophthalmic suspension, , Disp: , Rfl:     traZODone (DESYREL) 150 MG tablet, , Disp: , Rfl:     prazosin (MINIPRESS) 1 MG capsule, , Disp: , Rfl:      Allergies     Allergies   Allergen Reactions    Latex Hives     Tree nuts and peanuts    Nuts Anaphylaxis       Social History       Social History     Social History Narrative    Not on file       Immunizations     Immunization:  Immunization History   Administered Date(s) Administered    COVID-19 (MODERNA) 1st,2nd,3rd Dose Monovalent 04/27/2021, 05/25/2021    COVID-19 (MODERNA) BIVALENT 12+YRS 10/14/2022    PPD Test 07/10/2023, 07/19/2023    Td (TDVAX) 08/20/1997          Objective     Objective     Vital Signs:   /91   Pulse 79   Temp 98.6 °F (37 °C) (Temporal)   Resp 17   Ht 165.1 cm (65\")   Wt 68.6 kg (151 lb 3.2 oz)   " SpO2 100%   BMI 25.16 kg/m²       Physical Exam  Vitals reviewed.   Constitutional:       General: She is not in acute distress.     Appearance: Normal appearance.   HENT:      Head: Normocephalic and atraumatic.      Right Ear: Tympanic membrane normal.      Left Ear: Tympanic membrane normal.      Nose: Nose normal.      Mouth/Throat:      Pharynx: Oropharynx is clear. No posterior oropharyngeal erythema.   Eyes:      General: No scleral icterus.     Extraocular Movements: Extraocular movements intact.      Conjunctiva/sclera: Conjunctivae normal.      Pupils: Pupils are equal, round, and reactive to light.   Cardiovascular:      Rate and Rhythm: Normal rate and regular rhythm.      Pulses: Normal pulses.      Heart sounds: Normal heart sounds.   Pulmonary:      Effort: Pulmonary effort is normal.      Breath sounds: Normal breath sounds.   Chest:   Breasts:     Right: Normal.      Left: Normal.   Abdominal:      General: Bowel sounds are normal.      Palpations: Abdomen is soft.      Hernia: There is no hernia in the left inguinal area or right inguinal area.   Genitourinary:     General: Normal vulva.      Pubic Area: No rash.       Labia:         Right: No rash, tenderness or lesion.         Left: No rash, tenderness or lesion.       Urethra: No prolapse, urethral pain, urethral swelling or urethral lesion.      Vagina: Normal. No vaginal discharge or lesions.      Uterus: Absent.       Rectum: No mass or external hemorrhoid.   Musculoskeletal:         General: Normal range of motion.      Cervical back: Neck supple.   Skin:     General: Skin is warm and dry.   Neurological:      Mental Status: She is alert and oriented to person, place, and time.   Psychiatric:         Mood and Affect: Mood normal.         Behavior: Behavior normal.         Thought Content: Thought content normal.         Judgment: Judgment normal.         Physical Exam  Breast: No masses or tenderness  Pelvic: No abnormalities  noted      Results      Result Review :   The following data was reviewed by: ZEKE Her on 07/28/2025:    Radiologic studies mammo 9/24    Results           Assessment and Plan        Assessment and Plan    Diagnoses and all orders for this visit:    1. Women's annual routine gynecological examination (Primary)  Assessment & Plan:  Discussed age-appropriate preventative counseling including all recommended screenings and immunizations, dental health, daily sunscreen, and seatbelt use.. Discussed issues common to age group and preventive medicine services, as well as any needed anticipatory guidance. Written information provided to patient. All questions answered. Pt verbalized understanding.       Orders:  -     IGP, Rfx Aptima HPV ASCU; Future  -     IGP, Rfx Aptima HPV ASCU    2. Screening for STD (sexually transmitted disease)  -     MVP Vaginosis Panel - Swab, Vagina; Future  -     HSV 1 and 2-Specific Ab, IgG; Future  -     Hepatitis panel, acute; Future  -     HIV-1/O/2 ANTIGEN/ANTIBODY, 4TH GENERATION; Future  -     RPR; Future  -     MVP Vaginosis Panel - Swab, Vagina    3. Screening for thyroid disorder  -     TSH; Future    4. Screening for lipid disorders  -     Comprehensive Metabolic Panel; Future  -     CBC & Differential; Future  -     Lipid Panel; Future    5. SHASHI (generalized anxiety disorder)  Assessment & Plan:  Psychological condition is improving with treatment.  Continue current treatment regimen.  Regular aerobic exercise.  Psychological condition  will be reassessed at the next regular appointment.         6. Essential hypertension  Assessment & Plan:  Hypertension is improving with treatment.  Continue current treatment regimen.  Dietary sodium restriction.  Weight loss.  Regular aerobic exercise.  Blood pressure will be reassessed at the next regular appointment.        7. Cigarette nicotine dependence without complication  Assessment & Plan:  Tobacco use is  unchanged.  Smoking cessation counseling was provided.  Pharmacotherapy was prescribed as ordered.  Tobacco use will be reassessed at the next regular appointment.      8. Encounter for screening mammogram for malignant neoplasm of breast  -     Mammo Screening Digital Tomosynthesis Bilateral With CAD; Future        Assessment & Plan  1. Health maintenance.  - Mammogram ordered, to be scheduled after 09/2025.  - Routine labs, including STD screening, ordered.  - Breast exam and Pap smear performed during this visit.  - Results will be communicated via phone call and available on [employer].        Follow Up        Follow Up   No follow-ups on file.  Patient was given instructions and counseling regarding her condition or for health maintenance advice. Please see specific information pulled into the AVS if appropriate.      Patient or patient representative verbalized consent for the use of Ambient Listening during the visit with  ZEKE Her for chart documentation. 7/28/2025  09:16 EDT

## 2025-07-29 RX ORDER — FLUCONAZOLE 150 MG/1
150 TABLET ORAL ONCE
Qty: 1 TABLET | Refills: 0 | Status: SHIPPED | OUTPATIENT
Start: 2025-07-29 | End: 2025-07-29

## 2025-07-30 LAB
CONV .: NORMAL
CYTOLOGIST CVX/VAG CYTO: NORMAL
CYTOLOGY CVX/VAG DOC CYTO: NORMAL
CYTOLOGY CVX/VAG DOC THIN PREP: NORMAL
DX ICD CODE: NORMAL
OTHER STN SPEC: NORMAL
SERVICE CMNT-IMP: NORMAL
STAT OF ADQ CVX/VAG CYTO-IMP: NORMAL

## (undated) DEVICE — TROCAR: Brand: KII OPTICAL ACCESS SYSTEM

## (undated) DEVICE — GLV SURG SENSICARE ORTHO PF LF 7 STRL

## (undated) DEVICE — SUT MNCRYL PLS ANTIB UD 4/0 PS2 18IN

## (undated) DEVICE — ENDOPATH XCEL WITH OPTIVIEW TECHNOLOGY UNIVERSAL TROCAR STABILITY SLEEVES: Brand: ENDOPATH XCEL OPTIVIEW

## (undated) DEVICE — GLV SURG BIOGEL LTX PF 6 1/2

## (undated) DEVICE — 40580 - THE PINK PAD - ADVANCED TRENDELENBURG POSITIONING KIT: Brand: 40580 - THE PINK PAD - ADVANCED TRENDELENBURG POSITIONING KIT

## (undated) DEVICE — SYR LL TP 10ML STRL

## (undated) DEVICE — PAD GRND REM POLYHESIVE A/ DISP

## (undated) DEVICE — MANIP UTER RUMI 2 KOH EFFICIENT 3CM BL

## (undated) DEVICE — DUAL LUMEN STOMACH TUBE,ANTI-REFLUX VALVE: Brand: SALEM SUMP

## (undated) DEVICE — LAPAROSCOPIC DISSECTOR: Brand: DEROYAL

## (undated) DEVICE — TBG INSUFFLATION W/CPC CONNEC: Brand: MEDLINE INDUSTRIES, INC.

## (undated) DEVICE — SOL IRR H2O BG 1000ML STRL

## (undated) DEVICE — GYN LAPAROSCOPY-LF: Brand: MEDLINE INDUSTRIES, INC.

## (undated) DEVICE — INTENDED FOR TISSUE SEPARATION, AND OTHER PROCEDURES THAT REQUIRE A SHARP SURGICAL BLADE TO PUNCTURE OR CUT.: Brand: BARD-PARKER ® CARBON RIB-BACK BLADES

## (undated) DEVICE — ENDOPATH XCEL WITH OPTIVIEW TECHNOLOGY BLADELESS TROCARS WITH STABILITY SLEEVES: Brand: ENDOPATH XCEL OPTIVIEW

## (undated) DEVICE — GLV SURG SENSICARE SLT PF LF 7.5 STRL

## (undated) DEVICE — ANTIBACTERIAL VIOLET BRAIDED (POLYGLACTIN 910), SYNTHETIC ABSORBABLE SUTURE: Brand: COATED VICRYL

## (undated) DEVICE — TOWEL,OR,DSP,ST,BLUE,STD,4/PK,20PK/CS: Brand: MEDLINE

## (undated) DEVICE — INSUFFLATION NEEDLE TO ESTABLISH PNEUMOPERITONEUM.: Brand: INSUFFLATION NEEDLE

## (undated) DEVICE — ENDOPATH XCEL BLADELESS TROCARS WITH STABILITY SLEEVES: Brand: ENDOPATH XCEL

## (undated) DEVICE — SLV SCD LEG COMFORT KENDALLSCD MD REPROC

## (undated) DEVICE — LAPAROSCOPIC SMOKE EVACUATION SYSTEM ACTIVE AND PASSIVE: Brand: VALLEYLAB

## (undated) DEVICE — ANTIBACTERIAL UNDYED BRAIDED (POLYGLACTIN 910), SYNTHETIC ABSORBABLE SUTURE: Brand: COATED VICRYL

## (undated) DEVICE — SOL IRRG H2O BG 3000ML STRL

## (undated) DEVICE — SUT VIC 0 UR6 27IN VCP603H

## (undated) DEVICE — PREP TRAY WITH CHG: Brand: MEDLINE INDUSTRIES, INC.

## (undated) DEVICE — Device

## (undated) DEVICE — RETRACTABLE L-HOOK LAPAROSCOPIC SEALER/DIVIDER: Brand: LIGASURE

## (undated) DEVICE — ADHS LIQ MASTISOL 2/3ML

## (undated) DEVICE — SUT VIC PLS CTD BR 0 TIE 18IN VIL

## (undated) DEVICE — TRY FOL URINE METER STATLOCK 16F 5CC

## (undated) DEVICE — LITHOTOMY-YELLOW FINS: Brand: MEDLINE INDUSTRIES, INC.

## (undated) DEVICE — MANIP UTER RUMI TP 6.7MMX8CM BLU

## (undated) DEVICE — COVADERM PLUS: Brand: DEROYAL

## (undated) DEVICE — VISUALIZATION SYSTEM: Brand: CLEARIFY

## (undated) DEVICE — SYS CLOSE PORTII CARTR/THOMASN XL

## (undated) DEVICE — TISSUE RETRIEVAL SYSTEM: Brand: INZII RETRIEVAL SYSTEM

## (undated) DEVICE — CYSTO/BLADDER IRRIGATION SET, REGULATING CLAMP